# Patient Record
Sex: MALE | Race: WHITE
[De-identification: names, ages, dates, MRNs, and addresses within clinical notes are randomized per-mention and may not be internally consistent; named-entity substitution may affect disease eponyms.]

---

## 2017-05-02 ENCOUNTER — PRIOR ORIGINAL RECORDS (OUTPATIENT)
Dept: OTHER | Age: 72
End: 2017-05-02

## 2017-07-20 ENCOUNTER — HOSPITAL ENCOUNTER (OUTPATIENT)
Dept: MRI IMAGING | Facility: HOSPITAL | Age: 72
Discharge: HOME | End: 2017-07-20
Attending: INTERNAL MEDICINE
Payer: COMMERCIAL

## 2017-07-20 DIAGNOSIS — M54.50 CHRONIC LEFT-SIDED LOW BACK PAIN: ICD-10-CM

## 2017-07-20 DIAGNOSIS — G89.29 CHRONIC LEFT-SIDED LOW BACK PAIN: ICD-10-CM

## 2017-07-20 DIAGNOSIS — M54.42 ACUTE BACK PAIN WITH SCIATICA, LEFT: ICD-10-CM

## 2017-07-20 PROCEDURE — A9575 INJ GADOTERATE MEGLUMI 0.1ML: HCPCS | Performed by: RADIOLOGY

## 2017-07-20 PROCEDURE — 72158 MRI LUMBAR SPINE W/O & W/DYE: CPT | Performed by: INTERNAL MEDICINE

## 2017-08-04 PROBLEM — M48.061 LUMBAR FORAMINAL STENOSIS: Status: ACTIVE | Noted: 2017-08-04

## 2017-08-04 PROBLEM — M43.16 SPONDYLOLISTHESIS OF LUMBAR REGION: Status: ACTIVE | Noted: 2017-08-04

## 2017-08-04 PROBLEM — M43.06 LUMBAR SPONDYLOLYSIS: Status: ACTIVE | Noted: 2017-08-04

## 2017-11-07 ENCOUNTER — PRIOR ORIGINAL RECORDS (OUTPATIENT)
Dept: OTHER | Age: 72
End: 2017-11-07

## 2017-11-09 ENCOUNTER — CHARTING TRANS (OUTPATIENT)
Dept: OTHER | Age: 72
End: 2017-11-09

## 2017-11-14 PROBLEM — M51.36 DDD (DEGENERATIVE DISC DISEASE), LUMBAR: Status: ACTIVE | Noted: 2017-11-14

## 2017-11-14 PROBLEM — M51.369 DDD (DEGENERATIVE DISC DISEASE), LUMBAR: Status: ACTIVE | Noted: 2017-11-14

## 2017-11-14 PROBLEM — M54.16 LUMBAR RADICULITIS: Status: ACTIVE | Noted: 2017-11-14

## 2017-11-14 PROBLEM — M47.816 LUMBAR SPONDYLOSIS: Status: ACTIVE | Noted: 2017-11-14

## 2018-02-08 ENCOUNTER — HOSPITAL ENCOUNTER (OUTPATIENT)
Dept: GENERAL RADIOLOGY | Age: 73
Discharge: HOME | End: 2018-02-08
Attending: INTERNAL MEDICINE
Payer: MEDICARE

## 2018-02-08 ENCOUNTER — OFFICE VISIT (OUTPATIENT)
Dept: INTERNAL MEDICINE CLINIC | Facility: CLINIC | Age: 73
End: 2018-02-08

## 2018-02-08 VITALS
WEIGHT: 187.81 LBS | RESPIRATION RATE: 14 BRPM | OXYGEN SATURATION: 97 % | SYSTOLIC BLOOD PRESSURE: 106 MMHG | DIASTOLIC BLOOD PRESSURE: 60 MMHG | HEIGHT: 66.25 IN | TEMPERATURE: 97 F | HEART RATE: 55 BPM | BODY MASS INDEX: 30.18 KG/M2

## 2018-02-08 DIAGNOSIS — R68.84 JAW PAIN, NON-TMJ: ICD-10-CM

## 2018-02-08 DIAGNOSIS — R05.3 CHRONIC COUGH: Primary | ICD-10-CM

## 2018-02-08 DIAGNOSIS — R05.3 CHRONIC COUGH: ICD-10-CM

## 2018-02-08 PROBLEM — M43.06 LUMBAR SPONDYLOLYSIS: Status: RESOLVED | Noted: 2017-08-04 | Resolved: 2018-02-08

## 2018-02-08 PROCEDURE — 99204 OFFICE O/P NEW MOD 45 MIN: CPT | Performed by: INTERNAL MEDICINE

## 2018-02-08 PROCEDURE — 71046 X-RAY EXAM CHEST 2 VIEWS: CPT | Performed by: INTERNAL MEDICINE

## 2018-02-08 RX ORDER — AZELASTINE HYDROCHLORIDE, FLUTICASONE PROPIONATE 137; 50 UG/1; UG/1
1 SPRAY, METERED NASAL 2 TIMES DAILY
Qty: 1 BOTTLE | Refills: 2 | Status: SHIPPED | OUTPATIENT
Start: 2018-02-08 | End: 2018-02-12

## 2018-02-08 NOTE — PATIENT INSTRUCTIONS
Jaw rest: no chewey food like gum and cut food into small pieces, no big bites like bagel sandwiches, no hard bites like apples, use hot pack for the jaw when acting up,

## 2018-02-08 NOTE — PROGRESS NOTES
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 Imaging & Consults:  XR CHEST PA + LAT CHEST (CPT=71046)    Return for supervisit.     Patient Instructions   Tramaine Fuller

## 2018-02-12 ENCOUNTER — TELEPHONE (OUTPATIENT)
Dept: INTERNAL MEDICINE CLINIC | Facility: CLINIC | Age: 73
End: 2018-02-12

## 2018-02-12 RX ORDER — MOMETASONE 50 UG/1
SPRAY, METERED NASAL
Qty: 1 BOTTLE | Refills: 2 | Status: SHIPPED | OUTPATIENT
Start: 2018-02-12 | End: 2018-02-15

## 2018-02-12 NOTE — TELEPHONE ENCOUNTER
Fax (Prior Auth Form) received from [employer], [address] [place] [address] not covered.  Required clinical information for prior auth to be considered is pt to have tried either fluticasone, flunisolide, nasonex, or triamcinolone nasal sprays WITH concurrent use of azelastine o

## 2018-02-12 NOTE — TELEPHONE ENCOUNTER
Switch to nasonex, 2 sprays each nostril daily , #1, 2 refills  If no improvement 1 month, add azelastine 2 sprays BID, call for Rx

## 2018-02-12 NOTE — TELEPHONE ENCOUNTER
Rx ordered and sent to local pharmacy (per pt request). Spoke with pt, advised Dymista not covered by insurance. Advised of new rx, if no improvement will add 2nd nasal spray, if still fails, can try Dymista again.  Pt to call after a month if no improveme

## 2018-02-13 ENCOUNTER — TELEPHONE (OUTPATIENT)
Dept: INTERNAL MEDICINE CLINIC | Facility: CLINIC | Age: 73
End: 2018-02-13

## 2018-02-13 DIAGNOSIS — R05.3 CHRONIC COUGH: Primary | ICD-10-CM

## 2018-02-13 NOTE — TELEPHONE ENCOUNTER
Prior Auth required for Nasonex ordered yesterday. Nasonex was ordered in place of Dymista which was not approved and required pt to try and fail 2 nasal sprays, nasonex as 1 that was marked as recommended.    Prior Auth initiated for Nasonex via [employer]

## 2018-02-14 NOTE — TELEPHONE ENCOUNTER
Nasonex denied. Nasacort, Rhinocort and generic flunisolide are covered. Letter to Dr Luana Cruz, please advise.

## 2018-02-14 NOTE — TELEPHONE ENCOUNTER
Received fax from [address] [place] [address] Dr hurd: Notice of Denial of Mometasone. Nasocort listed as alternative. Noted below that Prior Auth for alternative Nasonex  initiated. Await  PA answer. Form in Triage pending bin.

## 2018-02-14 NOTE — TELEPHONE ENCOUNTER
Incoming (mail or fax): Fax  Received from:  [employer] - Notice of denial for Mometasone  Documentation given to:  [id number] fax bin.

## 2018-02-15 RX ORDER — TRIAMCINOLONE ACETONIDE 55 UG/1
SPRAY, METERED NASAL
Qty: 1 INHALER | Refills: 2 | Status: SHIPPED | OUTPATIENT
Start: 2018-02-15 | End: 2018-08-02 | Stop reason: ALTCHOICE

## 2018-02-15 NOTE — TELEPHONE ENCOUNTER
Spoke with pt. Advised as below that Nasocort ordered and pt to use 2 sprays in each nostril daily. Pt voiced understanding.

## 2018-02-15 NOTE — TELEPHONE ENCOUNTER
Noted below. D/Cd Mometasone. Ordered Nasocort. Left message on pt's voice mail advising that nasocort ordered. Pt to call back to confirm receipt of message.

## 2018-03-26 NOTE — TELEPHONE ENCOUNTER
Rx had already been changed to Nasonex. Pt to try Nasonex first then if no improvement, would try 2nd nasal spray before trying Dymista again. At this time, Thea Lyons has been refused by insurance.

## 2018-03-31 ENCOUNTER — HOSPITAL ENCOUNTER (OUTPATIENT)
Age: 73
Discharge: HOME | End: 2018-03-31
Attending: FAMILY MEDICINE
Payer: MEDICARE

## 2018-03-31 VITALS
HEART RATE: 57 BPM | WEIGHT: 185 LBS | BODY MASS INDEX: 27.4 KG/M2 | TEMPERATURE: 98 F | RESPIRATION RATE: 16 BRPM | HEIGHT: 69 IN | DIASTOLIC BLOOD PRESSURE: 77 MMHG | OXYGEN SATURATION: 94 % | SYSTOLIC BLOOD PRESSURE: 134 MMHG

## 2018-03-31 DIAGNOSIS — J40 BRONCHITIS: Primary | ICD-10-CM

## 2018-03-31 PROCEDURE — 99213 OFFICE O/P EST LOW 20 MIN: CPT

## 2018-03-31 PROCEDURE — 99204 OFFICE O/P NEW MOD 45 MIN: CPT

## 2018-03-31 RX ORDER — AZITHROMYCIN 250 MG/1
TABLET, FILM COATED ORAL
Qty: 1 PACKAGE | Refills: 0 | Status: SHIPPED | OUTPATIENT
Start: 2018-03-31 | End: 2018-04-05

## 2018-03-31 NOTE — ED PROVIDER NOTES
Patient Seen in: [facility] [place] [facility]    History   Patient presents with:  Cough/URI    Stated Complaint: cough, body aches, chills, fever    HPI  67year old with cough and production for past two days.  Felt cold and had chills yest (5' 9\")   Wt 83.9 kg   SpO2 94%   BMI 27.32 kg/m²         Physical Exam   Constitutional: He appears well-developed and well-nourished. HENT:   Head: Normocephalic and atraumatic. Neck: Normal range of motion. Neck supple.    Cardiovascular: Normal rat

## 2018-03-31 NOTE — ED INITIAL ASSESSMENT (HPI)
Since yesterday, c/o body aches, productive cough, chills and fatigue. Using OTC cold products with minimal relief.

## 2018-04-09 ENCOUNTER — HOSPITAL ENCOUNTER (OUTPATIENT)
Age: 73
Discharge: HOME | End: 2018-04-09
Attending: FAMILY MEDICINE
Payer: MEDICARE

## 2018-04-09 ENCOUNTER — APPOINTMENT (OUTPATIENT)
Dept: GENERAL RADIOLOGY | Age: 73
End: 2018-04-09
Attending: FAMILY MEDICINE
Payer: MEDICARE

## 2018-04-09 VITALS
DIASTOLIC BLOOD PRESSURE: 80 MMHG | SYSTOLIC BLOOD PRESSURE: 127 MMHG | RESPIRATION RATE: 18 BRPM | HEIGHT: 69 IN | TEMPERATURE: 98 F | HEART RATE: 45 BPM | BODY MASS INDEX: 27.4 KG/M2 | OXYGEN SATURATION: 95 % | WEIGHT: 185 LBS

## 2018-04-09 DIAGNOSIS — J20.9 ACUTE BRONCHITIS, UNSPECIFIED ORGANISM: Primary | ICD-10-CM

## 2018-04-09 PROCEDURE — 99214 OFFICE O/P EST MOD 30 MIN: CPT

## 2018-04-09 PROCEDURE — 71046 X-RAY EXAM CHEST 2 VIEWS: CPT | Performed by: FAMILY MEDICINE

## 2018-04-09 PROCEDURE — 99213 OFFICE O/P EST LOW 20 MIN: CPT

## 2018-04-09 RX ORDER — BENZONATATE 100 MG/1
100 CAPSULE ORAL 3 TIMES DAILY PRN
Qty: 21 CAPSULE | Refills: 0 | Status: SHIPPED | OUTPATIENT
Start: 2018-04-09 | End: 2018-04-16

## 2018-04-09 RX ORDER — DOXYCYCLINE 150 MG/1
100 CAPSULE ORAL 2 TIMES DAILY
Qty: 20 CAPSULE | Refills: 0 | Status: SHIPPED | OUTPATIENT
Start: 2018-04-09 | End: 2018-04-19

## 2018-04-09 NOTE — ED INITIAL ASSESSMENT (HPI)
The patient is here with complaints of a productive cough x 2 weeks. He states the mucus was yellow-brown at first and the last couple of days has been clear. Had chills once or twice in the beginning but denies anything since.   He has been using Delsym

## 2018-04-09 NOTE — ED PROVIDER NOTES
Patient Seen in: [facility]    History   Patient presents with:  Cough/URI    Stated Complaint: cold symptoms x2 weeks    HPI    68-year-old male presents with chief complaint of cough for the past 2 weeks.   He describes hi 9\")   Wt 83.9 kg   SpO2 95%   BMI 27.32 kg/m²         Physical Exam    Patient is alert oriented ×3 in no acute distress   conjunctiva clear no icterus  Bilateral tympanic membrane is clear without any redness  Oropharynx : No erythema, no exudates.   Neck symptoms. If no improvement start doxycycline. Follow PCP in a week. If is any worsening symptoms recommend follow-up early or go to the ER.           Disposition and Plan     Clinical Impression:  Acute bronchitis, unspecified organism  (primary encount

## 2018-05-21 PROBLEM — D69.6 THROMBOCYTOPENIA (HCC): Status: ACTIVE | Noted: 2018-05-21

## 2018-05-21 PROBLEM — M47.816 LUMBAR SPONDYLOSIS: Status: RESOLVED | Noted: 2017-11-14 | Resolved: 2018-05-21

## 2018-05-21 PROBLEM — R68.84 JAW PAIN, NON-TMJ: Status: RESOLVED | Noted: 2018-02-08 | Resolved: 2018-05-21

## 2018-05-21 PROBLEM — I70.0 ATHEROSCLEROSIS OF AORTA: Status: ACTIVE | Noted: 2018-05-21

## 2018-05-21 PROBLEM — M51.36 DDD (DEGENERATIVE DISC DISEASE), LUMBAR: Status: RESOLVED | Noted: 2017-11-14 | Resolved: 2018-05-21

## 2018-05-21 PROBLEM — I70.0 ATHEROSCLEROSIS OF AORTA (HCC): Status: ACTIVE | Noted: 2018-05-21

## 2018-05-21 PROBLEM — M51.369 DDD (DEGENERATIVE DISC DISEASE), LUMBAR: Status: RESOLVED | Noted: 2017-11-14 | Resolved: 2018-05-21

## 2018-05-21 PROBLEM — M47.816 ARTHRITIS, LUMBAR SPINE: Status: ACTIVE | Noted: 2018-05-21

## 2018-05-21 PROBLEM — M48.061 LUMBAR FORAMINAL STENOSIS: Status: RESOLVED | Noted: 2017-08-04 | Resolved: 2018-05-21

## 2018-05-21 PROBLEM — M54.16 LUMBAR RADICULITIS: Status: RESOLVED | Noted: 2017-11-14 | Resolved: 2018-05-21

## 2018-05-21 PROBLEM — D69.6 THROMBOCYTOPENIA: Status: ACTIVE | Noted: 2018-05-21

## 2018-05-21 PROBLEM — M43.16 SPONDYLOLISTHESIS OF LUMBAR REGION: Status: RESOLVED | Noted: 2017-08-04 | Resolved: 2018-05-21

## 2018-05-22 ENCOUNTER — OFFICE VISIT (OUTPATIENT)
Dept: INTERNAL MEDICINE CLINIC | Facility: CLINIC | Age: 73
End: 2018-05-22

## 2018-05-22 ENCOUNTER — TELEPHONE (OUTPATIENT)
Dept: INTERNAL MEDICINE CLINIC | Facility: CLINIC | Age: 73
End: 2018-05-22

## 2018-05-22 VITALS
TEMPERATURE: 98 F | HEIGHT: 66.25 IN | WEIGHT: 189 LBS | BODY MASS INDEX: 30.37 KG/M2 | DIASTOLIC BLOOD PRESSURE: 70 MMHG | SYSTOLIC BLOOD PRESSURE: 130 MMHG | HEART RATE: 50 BPM | OXYGEN SATURATION: 98 % | RESPIRATION RATE: 16 BRPM

## 2018-05-22 DIAGNOSIS — D69.6 THROMBOCYTOPENIA (HCC): ICD-10-CM

## 2018-05-22 DIAGNOSIS — I70.0 ATHEROSCLEROSIS OF AORTA (HCC): ICD-10-CM

## 2018-05-22 DIAGNOSIS — Z00.00 ENCOUNTER FOR ANNUAL HEALTH EXAMINATION: Primary | ICD-10-CM

## 2018-05-22 DIAGNOSIS — M47.816 ARTHRITIS, LUMBAR SPINE: ICD-10-CM

## 2018-05-22 DIAGNOSIS — R39.9 LOWER URINARY TRACT SYMPTOMS (LUTS): ICD-10-CM

## 2018-05-22 DIAGNOSIS — E78.2 MIXED HYPERLIPIDEMIA: ICD-10-CM

## 2018-05-22 DIAGNOSIS — Z12.11 COLON CANCER SCREENING: ICD-10-CM

## 2018-05-22 DIAGNOSIS — I25.10 CORONARY ARTERY DISEASE INVOLVING NATIVE CORONARY ARTERY OF NATIVE HEART WITHOUT ANGINA PECTORIS: ICD-10-CM

## 2018-05-22 DIAGNOSIS — E03.9 ACQUIRED HYPOTHYROIDISM: ICD-10-CM

## 2018-05-22 DIAGNOSIS — R05.3 CHRONIC COUGH: ICD-10-CM

## 2018-05-22 PROCEDURE — G0439 PPPS, SUBSEQ VISIT: HCPCS | Performed by: INTERNAL MEDICINE

## 2018-05-22 PROCEDURE — 96160 PT-FOCUSED HLTH RISK ASSMT: CPT | Performed by: INTERNAL MEDICINE

## 2018-05-22 PROCEDURE — 99397 PER PM REEVAL EST PAT 65+ YR: CPT | Performed by: INTERNAL MEDICINE

## 2018-05-22 NOTE — PATIENT INSTRUCTIONS
Dary Nuñez SCREENING SCHEDULE   Tests on this list are recommended by your physician but may not be covered, or covered at this frequency, by your insurer. Please check with your insurance carrier before scheduling to verify coverage.     Rose Marie Mckeon years- more often if abnormal Colonoscopy,10 Years due on 04/21/1995 Update [facility] if applicable    Flex Sigmoidoscopy Screen  Covered every 5 years No results found for this or any previous visit. No flowsheet data found.      Fecal Occult Bloo cover unless Medically needed    Zoster (Not covered by Medicare Part B) No orders found for this or any previous visit.      CALL YOUR INSURANCE TO FIND OUT WHERE TO GET THE NEW 2-SHOT Brianna Severino 10 This may be covered with your pharmacy  prescription be

## 2018-05-22 NOTE — PROGRESS NOTES
HPI:   Cortney Del Rio is a 68year old male who presents for a MA (Medicare Advantage) Supervisit (Once per calendar year).     He has a chronic shallow tickly cough, normal CXR, using nasal spary and it helped some  He had CABG times 3 some years ago a Family/surrogate (if present), and forms available to patient in AVS         He smoked tobacco in the past but quit greater than 12 months ago.   Smoking status: Former Smoker                                                              Packs/day: 0.00 DAILY   Triamcinolone Acetonide 55 MCG/ACT Nasal Aerosol Two sprays in each nostril daily   METOPROLOL SUCCINATE ER 25 MG Oral Tablet 24 Hr TAKE 1 TABLET BY MOUTH  DAILY   EZETIMIBE 10 MG Oral Tab TAKE 1 TABLET BY MOUTH  DAILY   LOSARTAN POTASSIUM 25 MG Or BMI 30.28 kg/m²   Estimated body mass index is 30.28 kg/m² as calculated from the following:    Height as of this encounter: 66.25\". Weight as of this encounter: 189 lb.     Medicare Hearing Assessment  (Required for AWV/SWV)    Finger Rub christy b/l 13) 05/12/2016   • Pneumovax 23 10/05/2011   • TDAP 05/07/2012   • Zoster Vaccine Live (Zostavax) 07/05/2013        ASSESSMENT AND OTHER RELEVANT CHRONIC CONDITIONS:   Danielle Mistry is a 68year old male who presents for a Medicare Assessment.      PLAN ----------    Cardiovascular Disease Screening     LDL Annually Calculated LDL (mg/dL)   Date Value   12/15/2017 41        EKG - w/ Initial Preventative Physical Exam only, or if medically necessary Electrocardiogram date06/16/2016    Colorectal Cancer S Annual Monitoring of Persistent     Medications (ACE/ARB, digoxin diuretics, anticonvulsants.)    Potassium  Annually Potassium (mmol/L)   Date Value   12/15/2017 4.4    No flowsheet data found.     Creatinine  Annually Creatinine (mg/dL)   Date Value   1

## 2018-06-05 ENCOUNTER — OFFICE VISIT (OUTPATIENT)
Dept: SURGERY | Facility: CLINIC | Age: 73
End: 2018-06-05

## 2018-06-05 VITALS
HEART RATE: 50 BPM | WEIGHT: 185 LBS | RESPIRATION RATE: 16 BRPM | HEIGHT: 69 IN | BODY MASS INDEX: 27.4 KG/M2 | DIASTOLIC BLOOD PRESSURE: 77 MMHG | SYSTOLIC BLOOD PRESSURE: 126 MMHG

## 2018-06-05 DIAGNOSIS — Z12.11 SCREENING FOR MALIGNANT NEOPLASM OF COLON: Primary | ICD-10-CM

## 2018-06-05 RX ORDER — POLYETHYLENE GLYCOL 3350, SODIUM CHLORIDE, SODIUM BICARBONATE, POTASSIUM CHLORIDE 420; 11.2; 5.72; 1.48 G/4L; G/4L; G/4L; G/4L
POWDER, FOR SOLUTION ORAL
Qty: 1 BOTTLE | Refills: 0 | Status: SHIPPED | OUTPATIENT
Start: 2018-06-05 | End: 2018-08-02 | Stop reason: ALTCHOICE

## 2018-06-05 NOTE — H&P
New Patient Visit Note       Active Problems      1. Screening for malignant neoplasm of colon        Chief Complaint   Patient presents with:  Colonoscopy: [facility] PT ref by PCP for cscope consult. PT last cscope was when he was 48, had hemorrhoids.  PT denies drinks daily    Drug use: No            Other Topics            Concern  Caffeine Concern        No  Exercise                Yes  Seat Belt               Yes  Special Diet            No  Stress Concern          No  Weight Concern          No         Domitila urinating, dysuria, frequency and urgency. Musculoskeletal: Negative for arthralgias and myalgias. Skin: Negative for color change and rash. Neurological: Negative for tremors, syncope and weakness. Hematological: Negative for adenopathy.  Does not

## 2018-07-03 ENCOUNTER — MED REC SCAN ONLY (OUTPATIENT)
Dept: INTERNAL MEDICINE CLINIC | Facility: CLINIC | Age: 73
End: 2018-07-03

## 2018-07-26 ENCOUNTER — TELEPHONE (OUTPATIENT)
Dept: INTERNAL MEDICINE CLINIC | Facility: CLINIC | Age: 73
End: 2018-07-26

## 2018-07-26 NOTE — TELEPHONE ENCOUNTER
Noted below. The patient has never been evaluated here or referred out for his complaint of skin growths on his face.  Called the patient and informed him to make an appointment with either Dr. Adina Villalobos or Ronald Devine for an evaluation and check with his insura

## 2018-07-26 NOTE — TELEPHONE ENCOUNTER
Previous doctor (Dr Hortense Homans) is not part of patients network any longer. He is requesting to see someone in the [facility]     Functional Status Done?:  yes

## 2018-07-30 ENCOUNTER — OFFICE VISIT (OUTPATIENT)
Dept: SURGERY | Facility: CLINIC | Age: 73
End: 2018-07-30
Payer: COMMERCIAL

## 2018-07-30 VITALS
TEMPERATURE: 98 F | DIASTOLIC BLOOD PRESSURE: 90 MMHG | HEIGHT: 69 IN | SYSTOLIC BLOOD PRESSURE: 145 MMHG | WEIGHT: 185 LBS | BODY MASS INDEX: 27.4 KG/M2 | HEART RATE: 43 BPM

## 2018-07-30 DIAGNOSIS — K57.30 DIVERTICULOSIS OF LARGE INTESTINE WITHOUT DIVERTICULITIS: Primary | ICD-10-CM

## 2018-07-30 PROCEDURE — 99212 OFFICE O/P EST SF 10 MIN: CPT | Performed by: SURGERY

## 2018-07-30 NOTE — PROGRESS NOTES
Follow Up Visit Note       Active Problems      1.  Diverticulosis of large intestine without diverticulitis          Chief Complaint   Patient presents with:  Test Results: pt is here for colonoscopy results colonoscopy done 6/14         History of Present Yes  Special Diet            No  Stress Concern          No  Weight Concern          No         Current Outpatient Prescriptions:  PEG 3350-KCl-Na Bicarb-NaCl (TRILYTE) 420 g Oral Recon Soln Starting at 4:00 pm the night before procedure, drink 8 ounce dysuria, frequency and urgency. Musculoskeletal: Negative for arthralgias and myalgias. Skin: Negative for color change and rash. Neurological: Negative for tremors, syncope and weakness. Hematological: Negative for adenopathy.  Does not bruise/blee

## 2018-07-30 NOTE — PATIENT INSTRUCTIONS
HIGH FIBER DIET    This high fiber diet includes two major components. One is a natural high fiber that is dietary high. The other is a fiber supplement.     Natural Dietary Fiber:    These are very few products on the market that have high enough fiber c are simply an equivalent to a ground up apple peel. That is they just provide extra natural fiber. They will enter your colon in a non-digestable form providing bulk to your stool.   Therefore, they are safe to take on a daily basis for the rest of you li problems:    1. Most people describe a gassy or bloated feeling for the first ten days to two weeks. This will pass with time. I recommend that once you get on the diet that you stay on it and complete it as prescribed.   Again, you will enjoy normal curtis

## 2018-08-01 ENCOUNTER — TELEPHONE (OUTPATIENT)
Dept: INTERNAL MEDICINE CLINIC | Facility: CLINIC | Age: 73
End: 2018-08-01

## 2018-08-01 DIAGNOSIS — I25.10 CORONARY ARTERY DISEASE INVOLVING NATIVE CORONARY ARTERY OF NATIVE HEART WITHOUT ANGINA PECTORIS: Primary | ICD-10-CM

## 2018-08-01 DIAGNOSIS — E78.2 MIXED HYPERLIPIDEMIA: ICD-10-CM

## 2018-08-01 DIAGNOSIS — I70.0 ATHEROSCLEROSIS OF AORTA (HCC): ICD-10-CM

## 2018-08-01 NOTE — TELEPHONE ENCOUNTER
Patient was seen yesterday by Dr Shannan Norris who ordered a nuclear stress test for patient for today. Patient needs a referral for yesterday's appt (if possible) and for the test today. Please advise. Thank you!     Functional Status Done?:  No - provider'

## 2018-08-01 NOTE — TELEPHONE ENCOUNTER
AGUUSTA Franco at referrals.  Advised referral placed for dr visit, requested clarification for stress test.

## 2018-08-02 ENCOUNTER — OFFICE VISIT (OUTPATIENT)
Dept: INTERNAL MEDICINE CLINIC | Facility: CLINIC | Age: 73
End: 2018-08-02
Payer: COMMERCIAL

## 2018-08-02 VITALS
WEIGHT: 190 LBS | HEART RATE: 52 BPM | OXYGEN SATURATION: 97 % | BODY MASS INDEX: 28.14 KG/M2 | HEIGHT: 69 IN | TEMPERATURE: 98 F | RESPIRATION RATE: 14 BRPM | SYSTOLIC BLOOD PRESSURE: 124 MMHG | DIASTOLIC BLOOD PRESSURE: 76 MMHG

## 2018-08-02 DIAGNOSIS — L57.0 ACTINIC KERATOSIS: Primary | ICD-10-CM

## 2018-08-02 PROCEDURE — 99213 OFFICE O/P EST LOW 20 MIN: CPT | Performed by: INTERNAL MEDICINE

## 2018-08-02 NOTE — PROGRESS NOTES
Kelley Goldmann is a 68year old male  Patient presents with:  Bump: Different Areas with Raised Bumps, pimple like  Referral: DERM      HPI:   Bumps on scalp  He had the same thing 2 years ago and derm burned him   He feels they might be the same     Cur Standard drinks or equivalent: 1 per week     Comment: Daily 1-2 drinks daily     Patient Active Problem List:     Hypothyroid     Coronary artery disease involving native coronary artery of native heart without angina pectoris     Mixed hyperlipidemia

## 2018-08-03 ENCOUNTER — TELEPHONE (OUTPATIENT)
Dept: INTERNAL MEDICINE CLINIC | Facility: CLINIC | Age: 73
End: 2018-08-03

## 2018-08-03 NOTE — TELEPHONE ENCOUNTER
Rhoda Busch from Dr. Daniel Walker office called back to have our office fax the referral. Rhoda Busch stated their office would order nuclear test. Referral faxed and confirmed.

## 2018-08-30 ENCOUNTER — TELEPHONE (OUTPATIENT)
Dept: INTERNAL MEDICINE CLINIC | Facility: CLINIC | Age: 73
End: 2018-08-30

## 2018-08-30 NOTE — TELEPHONE ENCOUNTER
Incoming (mail or fax):  fax  Received from:  Yasmany Phlegm  Documentation given to:  Dr Brad Sexton

## 2018-11-08 ENCOUNTER — CHARTING TRANS (OUTPATIENT)
Dept: OTHER | Age: 73
End: 2018-11-08

## 2018-11-27 ENCOUNTER — LAB ENCOUNTER (OUTPATIENT)
Dept: LAB | Age: 73
End: 2018-11-27
Attending: INTERNAL MEDICINE
Payer: MEDICARE

## 2018-11-27 ENCOUNTER — OFFICE VISIT (OUTPATIENT)
Dept: INTERNAL MEDICINE CLINIC | Facility: CLINIC | Age: 73
End: 2018-11-27
Payer: COMMERCIAL

## 2018-11-27 VITALS
DIASTOLIC BLOOD PRESSURE: 64 MMHG | BODY MASS INDEX: 28.17 KG/M2 | HEIGHT: 69 IN | TEMPERATURE: 98 F | HEART RATE: 45 BPM | RESPIRATION RATE: 16 BRPM | SYSTOLIC BLOOD PRESSURE: 112 MMHG | OXYGEN SATURATION: 98 % | WEIGHT: 190.19 LBS

## 2018-11-27 DIAGNOSIS — D69.6 THROMBOCYTOPENIA (HCC): ICD-10-CM

## 2018-11-27 DIAGNOSIS — E78.2 MIXED HYPERLIPIDEMIA: ICD-10-CM

## 2018-11-27 DIAGNOSIS — G89.29 CHRONIC SCAPULAR PAIN: ICD-10-CM

## 2018-11-27 DIAGNOSIS — E03.9 ACQUIRED HYPOTHYROIDISM: ICD-10-CM

## 2018-11-27 DIAGNOSIS — M75.51 BURSITIS OF RIGHT SHOULDER: ICD-10-CM

## 2018-11-27 DIAGNOSIS — I25.10 CORONARY ARTERY DISEASE INVOLVING NATIVE CORONARY ARTERY OF NATIVE HEART WITHOUT ANGINA PECTORIS: ICD-10-CM

## 2018-11-27 DIAGNOSIS — R39.9 LOWER URINARY TRACT SYMPTOMS (LUTS): ICD-10-CM

## 2018-11-27 DIAGNOSIS — M89.8X1 CHRONIC SCAPULAR PAIN: ICD-10-CM

## 2018-11-27 DIAGNOSIS — D69.6 THROMBOCYTOPENIA (HCC): Primary | ICD-10-CM

## 2018-11-27 PROCEDURE — 85025 COMPLETE CBC W/AUTO DIFF WBC: CPT

## 2018-11-27 PROCEDURE — 80053 COMPREHEN METABOLIC PANEL: CPT

## 2018-11-27 PROCEDURE — 80061 LIPID PANEL: CPT

## 2018-11-27 PROCEDURE — 36415 COLL VENOUS BLD VENIPUNCTURE: CPT

## 2018-11-27 PROCEDURE — 84443 ASSAY THYROID STIM HORMONE: CPT

## 2018-11-27 PROCEDURE — 84439 ASSAY OF FREE THYROXINE: CPT

## 2018-11-27 PROCEDURE — 99214 OFFICE O/P EST MOD 30 MIN: CPT | Performed by: INTERNAL MEDICINE

## 2018-11-27 RX ORDER — LEVOTHYROXINE SODIUM 175 UG/1
175 TABLET ORAL DAILY
COMMUNITY
Start: 2018-05-17 | End: 2018-11-29

## 2018-11-27 RX ORDER — SILDENAFIL 100 MG/1
100 TABLET, FILM COATED ORAL
Qty: 5 TABLET | Refills: 2 | Status: SHIPPED | OUTPATIENT
Start: 2018-11-27 | End: 2019-04-04

## 2018-11-27 NOTE — PROGRESS NOTES
Sherman Hatchet is a 68year old male.  To F/U from last visit regarding LUTS, arthritis spine, CAD, hypothyroidism, mild ITP  HPI:    summary from last visit 6 mos ago:  Lower urinary tract symptoms (LUTS)- , stable, no more hematuria since plavix d/c'd 3   EZETIMIBE 10 MG Oral Tab TAKE 1 TABLET BY MOUTH  DAILY Disp: 90 tablet Rfl: 3   LOSARTAN POTASSIUM 25 MG Oral Tab TAKE 1 TABLET BY MOUTH  NIGHTLY Disp: 90 tablet Rfl: 3   aspirin 81 MG Oral Tab Take 81 mg by mouth daily.  Disp:  Rfl:    Multiple Vitamin tsh  Lower urinary tract symptoms (luts)- see .  Also ED, refill viagra  Bursitis right shoulder: PT  Right scapular paini: PT    Orders Placed This Encounter      CBC With Differential With Platelet      CMP      Lipid Panel      TSH W Reflex To Free T4

## 2018-11-29 NOTE — PROGRESS NOTES
Spoke to patient, aware of results, and recommendations. Pt voiced understanding. Medication reconciled and sent to pharmacy.

## 2018-12-13 ENCOUNTER — PRIOR ORIGINAL RECORDS (OUTPATIENT)
Dept: OTHER | Age: 73
End: 2018-12-13

## 2018-12-31 ENCOUNTER — PRIOR ORIGINAL RECORDS (OUTPATIENT)
Dept: OTHER | Age: 73
End: 2018-12-31

## 2019-02-14 ENCOUNTER — HOSPITAL ENCOUNTER (OUTPATIENT)
Dept: PHYSICAL THERAPY | Facility: HOSPITAL | Age: 74
Setting detail: THERAPIES SERIES
Discharge: HOME | End: 2019-02-14
Attending: INTERNAL MEDICINE
Payer: MEDICARE

## 2019-02-14 DIAGNOSIS — M89.8X1 CHRONIC SCAPULAR PAIN: ICD-10-CM

## 2019-02-14 DIAGNOSIS — M75.51 BURSITIS OF RIGHT SHOULDER: ICD-10-CM

## 2019-02-14 DIAGNOSIS — G89.29 CHRONIC SCAPULAR PAIN: ICD-10-CM

## 2019-02-14 PROCEDURE — 97110 THERAPEUTIC EXERCISES: CPT | Performed by: PHYSICAL THERAPIST

## 2019-02-14 PROCEDURE — 97162 PT EVAL MOD COMPLEX 30 MIN: CPT | Performed by: PHYSICAL THERAPIST

## 2019-02-14 NOTE — PROGRESS NOTES
UPPER EXTREMITY EVALUATION:   Referring Physician: Dr. Maxine Frederick  Diagnosis: Bursitis of right shoulder (M75.51)  Chronic scapular pain (M89.8X1,G89.29)      Date of Service: 2/14/2019     PATIENT Jeanette Garcia is a 68year old y/o male w patient's chief complaint include palpation on the long head of the biceps, + impingement sign for the right rotator cuff. Functional limitations include limited with some ADL;s including dressing, using the right UE.    Reports also difficulty with pull OF CARE:    Goals:    · Pt will report improved ability to sleep without waking due to shoulder pain (8 visits)  · Pt will improve shoulder flexion AROM  to be able to reach into overhead cabinets without pain or restriction (8 visits)  · Pt will improve s plan of treatment and while under my care.     X___________________________________________________ Date____________________    Certification From: 4/65/3555  To:5/15/2019

## 2019-02-21 ENCOUNTER — HOSPITAL ENCOUNTER (OUTPATIENT)
Dept: PHYSICAL THERAPY | Facility: HOSPITAL | Age: 74
Setting detail: THERAPIES SERIES
Discharge: HOME | End: 2019-02-21
Attending: INTERNAL MEDICINE
Payer: MEDICARE

## 2019-02-21 PROCEDURE — 97110 THERAPEUTIC EXERCISES: CPT | Performed by: PHYSICAL THERAPIST

## 2019-02-21 PROCEDURE — 97140 MANUAL THERAPY 1/> REGIONS: CPT | Performed by: PHYSICAL THERAPIST

## 2019-02-21 NOTE — PROGRESS NOTES
Dx: Bursitis of right shoulder (M75.51)  Chronic scapular pain (M89.8X1,G89.29)         Authorized # of Visits:  8         Next MD visit: none scheduled  Fall Risk: standard         Precautions: n/a             Subjective: shoulder feels pretty good overal ER/IR  Rows  extension         Stm to right shoulder katey long head of biceps          Punches 20 3#  Er supine 20 3#   Er sidelying no wght x20          Scap retraction x20          standing rows   extension   Mini  press x20 2#

## 2019-02-28 ENCOUNTER — HOSPITAL ENCOUNTER (OUTPATIENT)
Dept: PHYSICAL THERAPY | Facility: HOSPITAL | Age: 74
Setting detail: THERAPIES SERIES
Discharge: HOME | End: 2019-02-28
Attending: INTERNAL MEDICINE
Payer: MEDICARE

## 2019-02-28 PROCEDURE — 97110 THERAPEUTIC EXERCISES: CPT | Performed by: PHYSICAL THERAPIST

## 2019-02-28 PROCEDURE — 97140 MANUAL THERAPY 1/> REGIONS: CPT | Performed by: PHYSICAL THERAPIST

## 2019-02-28 NOTE — PROGRESS NOTES
Dx: Bursitis of right shoulder (M75.51)  Chronic scapular pain (M89.8X1,G89.29)         Authorized # of Visits:  8         Next MD visit: none scheduled  Fall Risk: standard         Precautions: n/a             Subjective: Feels pretty good overall.   Only long head of biceps         Punches 20 3#  Er supine 20 3#   Er sidelying no wght x20  Punches 20 3#  Er supine 20 3#   Er sidelying no wght x20         Scap retraction x20  Scap retraction x20         standing rows   extension   Mini  press x20 2#

## 2019-03-07 ENCOUNTER — HOSPITAL ENCOUNTER (OUTPATIENT)
Dept: PHYSICAL THERAPY | Facility: HOSPITAL | Age: 74
Setting detail: THERAPIES SERIES
Discharge: HOME | End: 2019-03-07
Attending: INTERNAL MEDICINE
Payer: MEDICARE

## 2019-03-07 PROCEDURE — 97140 MANUAL THERAPY 1/> REGIONS: CPT | Performed by: PHYSICAL THERAPIST

## 2019-03-07 PROCEDURE — 97110 THERAPEUTIC EXERCISES: CPT | Performed by: PHYSICAL THERAPIST

## 2019-03-08 NOTE — PROGRESS NOTES
Dx: Bursitis of right shoulder (M75.51)  Chronic scapular pain (M89.8X1,G89.29)         Authorized # of Visits:  8         Next MD visit: none scheduled  Fall Risk: standard         Precautions: n/a             Subjective: shoulder feels pretty good overal UBE x5' (2,3)  UBE x5' (2,3)       Blue TB x20   ER/IR  Rows  extension Blue TB x20   ER/IR  Rows  extension Blue TB x20   ER/IR  Rows  extension       Stm to right shoulder katey long head of biceps  Stm to right shoulder katey long head of biceps  Stm to rig

## 2019-03-18 ENCOUNTER — TELEPHONE (OUTPATIENT)
Dept: INTERNAL MEDICINE CLINIC | Facility: CLINIC | Age: 74
End: 2019-03-18

## 2019-03-18 DIAGNOSIS — L57.0 ACTINIC KERATOSIS: Primary | ICD-10-CM

## 2019-03-18 NOTE — TELEPHONE ENCOUNTER
Patient requesting for referral to Dr. Daniel Wiley. Patient has seen derm multiple times in the past for Dx: Actinic keratosis (L57.0). Okay to place referral? Routed to Dr. Jacy Stacy. Jodi Beckman M.D.   Dermatology  [facility]  [facility]

## 2019-03-18 NOTE — TELEPHONE ENCOUNTER
Functional Status Done?:  yes                              Functional Status Needs Review?:  no    Specialty Provider's Name?:  Dr Chaya Larios?:  derm  Reason for Visit?:  f/u  Diagnosis?:    Number of Visits Requested?: [address]

## 2019-03-19 NOTE — TELEPHONE ENCOUNTER
We dont have a list.  We can place the referral to see. It's the patients responsibility to provide us with in network providers. If it comes back as out of network, he would need to provide us with another name.   Unfortunately, there is no list.  Referr

## 2019-03-28 ENCOUNTER — TELEPHONE (OUTPATIENT)
Dept: INTERNAL MEDICINE CLINIC | Facility: CLINIC | Age: 74
End: 2019-03-28

## 2019-03-28 DIAGNOSIS — D69.6 THROMBOCYTOPENIA (HCC): Primary | ICD-10-CM

## 2019-04-02 ENCOUNTER — PRIOR ORIGINAL RECORDS (OUTPATIENT)
Dept: OTHER | Age: 74
End: 2019-04-02

## 2019-04-04 ENCOUNTER — APPOINTMENT (OUTPATIENT)
Dept: LAB | Age: 74
End: 2019-04-04
Attending: INTERNAL MEDICINE
Payer: MEDICARE

## 2019-04-04 ENCOUNTER — OFFICE VISIT (OUTPATIENT)
Dept: INTERNAL MEDICINE CLINIC | Facility: CLINIC | Age: 74
End: 2019-04-04
Payer: COMMERCIAL

## 2019-04-04 VITALS
OXYGEN SATURATION: 97 % | WEIGHT: 190.5 LBS | DIASTOLIC BLOOD PRESSURE: 78 MMHG | TEMPERATURE: 98 F | SYSTOLIC BLOOD PRESSURE: 122 MMHG | HEIGHT: 67.25 IN | HEART RATE: 74 BPM | RESPIRATION RATE: 16 BRPM | BODY MASS INDEX: 29.55 KG/M2

## 2019-04-04 DIAGNOSIS — Z13.5 GLAUCOMA SCREENING: ICD-10-CM

## 2019-04-04 DIAGNOSIS — I70.0 ATHEROSCLEROSIS OF AORTA (HCC): ICD-10-CM

## 2019-04-04 DIAGNOSIS — E03.9 ACQUIRED HYPOTHYROIDISM: ICD-10-CM

## 2019-04-04 DIAGNOSIS — M47.816 ARTHRITIS, LUMBAR SPINE: ICD-10-CM

## 2019-04-04 DIAGNOSIS — D69.6 THROMBOCYTOPENIA (HCC): ICD-10-CM

## 2019-04-04 DIAGNOSIS — Z11.59 NEED FOR HEPATITIS C SCREENING TEST: ICD-10-CM

## 2019-04-04 DIAGNOSIS — N50.812 TESTICULAR PAIN, LEFT: ICD-10-CM

## 2019-04-04 DIAGNOSIS — R39.9 LOWER URINARY TRACT SYMPTOMS (LUTS): ICD-10-CM

## 2019-04-04 DIAGNOSIS — E78.2 MIXED HYPERLIPIDEMIA: ICD-10-CM

## 2019-04-04 DIAGNOSIS — I25.10 CORONARY ARTERY DISEASE INVOLVING NATIVE CORONARY ARTERY OF NATIVE HEART WITHOUT ANGINA PECTORIS: ICD-10-CM

## 2019-04-04 DIAGNOSIS — Z00.00 ENCOUNTER FOR ANNUAL HEALTH EXAMINATION: Primary | ICD-10-CM

## 2019-04-04 PROBLEM — Z12.11 SCREENING FOR MALIGNANT NEOPLASM OF COLON: Status: RESOLVED | Noted: 2018-06-05 | Resolved: 2019-04-04

## 2019-04-04 PROBLEM — R05.3 CHRONIC COUGH: Status: RESOLVED | Noted: 2018-02-08 | Resolved: 2019-04-04

## 2019-04-04 PROBLEM — K57.30 DIVERTICULOSIS OF LARGE INTESTINE WITHOUT DIVERTICULITIS: Status: RESOLVED | Noted: 2018-07-30 | Resolved: 2019-04-04

## 2019-04-04 PROCEDURE — 99397 PER PM REEVAL EST PAT 65+ YR: CPT | Performed by: INTERNAL MEDICINE

## 2019-04-04 PROCEDURE — 36415 COLL VENOUS BLD VENIPUNCTURE: CPT

## 2019-04-04 PROCEDURE — 86803 HEPATITIS C AB TEST: CPT

## 2019-04-04 PROCEDURE — G0439 PPPS, SUBSEQ VISIT: HCPCS | Performed by: INTERNAL MEDICINE

## 2019-04-04 PROCEDURE — 96160 PT-FOCUSED HLTH RISK ASSMT: CPT | Performed by: INTERNAL MEDICINE

## 2019-04-04 PROCEDURE — 84443 ASSAY THYROID STIM HORMONE: CPT

## 2019-04-04 RX ORDER — SILDENAFIL 100 MG/1
100 TABLET, FILM COATED ORAL
Qty: 5 TABLET | Refills: 2 | Status: SHIPPED | OUTPATIENT
Start: 2019-04-04 | End: 2021-02-15

## 2019-04-04 NOTE — PATIENT INSTRUCTIONS
Lucy Gonzalez SCREENING SCHEDULE   Tests on this list are recommended by your physician but may not be covered, or covered at this frequency, by your insurer. Please check with your insurance carrier before scheduling to verify coverage.     Jeff Ramos lifetime   • Anyone with a family history    Colorectal Cancer Screening Covered up to Age 76     Colonoscopy Screen   Covered every 10 years- more often if abnormal Colonoscopy due on 06/14/2028 Update [facility] if applicable    Flex Sigmoidoscop B) No orders found for this or any previous visit. This may be covered with your prescription benefits, but Medicare does not cover unless Medically needed    Zoster (Not covered by Medicare Part B) No orders found for this or any previous visit.  This may

## 2019-04-04 NOTE — PROGRESS NOTES
HPI:   Beatrice Lee is a 68year old male who presents for a MA (Medicare Advantage) Supervisit (Once per calendar year).     He has a chronic shallow tickly cough, normal CXR, using nasal spary and it helped some  He had CABG times 4 some years ago a forms available to patient in AVS         He smoked tobacco in the past but quit greater than 12 months ago.   Social History    Tobacco Use      Smoking status: Former Smoker        Years: 20.00        Types: Cigarettes        Quit date: 3/18/2005        Y Erectile Dysfunction. Metoprolol Succinate ER 25 MG Oral Tablet 24 Hr Take 1 tablet (25 mg total) by mouth once daily. ezetimibe 10 MG Oral Tab Take 1 tablet (10 mg total) by mouth once daily.    LOSARTAN POTASSIUM 25 MG Oral Tab TAKE 1 TABLET BY MOUTH traveling up the left side a little.  He still has nocturia every 2-3, stream is fine, some urgency, occ dribblling  Had TURP 2017  Integument/breast: he has some areas of concern on his face, seeing derm soon  Hematologic/lymphatic: low platelet count for or lesions   Lymph nodes: Cervical, supraclavicular, and axillary nodes normal   Neurologic: Normal             Vaccination History     Immunization History   Administered Date(s) Administered   • Influenza 11/01/2015, 10/01/2017   • Pneumococcal (Prevnar Internal Lab or Procedure External Lab or Procedure   Diabetes Screening      HbgA1C   Annually HgbA1C (%)   Date Value   12/02/2015 5.3       No flowsheet data found.     Fasting Blood Sugar (FSB)Annually Glucose (mg/dL)   Date Value   11/27/2018 82   12/1 with your prescription benefits, but Medicare does not cover unless Medically needed    Zoster   Not covered by Medicare Part B No vaccine history found This may be covered with your pharmacy  prescription benefits      [address] [facility] Internal

## 2019-04-17 RX ORDER — LEVOTHYROXINE SODIUM 175 UG/1
TABLET ORAL
Qty: 90 TABLET | Refills: 1 | Status: SHIPPED | OUTPATIENT
Start: 2019-04-17 | End: 2019-11-05

## 2019-05-16 ENCOUNTER — MED REC SCAN ONLY (OUTPATIENT)
Dept: INTERNAL MEDICINE CLINIC | Facility: CLINIC | Age: 74
End: 2019-05-16

## 2019-09-20 ENCOUNTER — OFFICE VISIT (OUTPATIENT)
Dept: INTERNAL MEDICINE CLINIC | Facility: CLINIC | Age: 74
End: 2019-09-20
Payer: COMMERCIAL

## 2019-09-20 ENCOUNTER — HOSPITAL ENCOUNTER (OUTPATIENT)
Dept: GENERAL RADIOLOGY | Age: 74
Discharge: HOME | End: 2019-09-20
Attending: INTERNAL MEDICINE
Payer: MEDICARE

## 2019-09-20 VITALS
HEIGHT: 67.25 IN | BODY MASS INDEX: 29.13 KG/M2 | RESPIRATION RATE: 14 BRPM | WEIGHT: 187.81 LBS | SYSTOLIC BLOOD PRESSURE: 110 MMHG | OXYGEN SATURATION: 97 % | HEART RATE: 58 BPM | TEMPERATURE: 98 F | DIASTOLIC BLOOD PRESSURE: 62 MMHG

## 2019-09-20 DIAGNOSIS — J18.9 PNEUMONIA OF BOTH LOWER LOBES DUE TO INFECTIOUS ORGANISM: ICD-10-CM

## 2019-09-20 DIAGNOSIS — J18.9 PNEUMONIA OF BOTH LOWER LOBES DUE TO INFECTIOUS ORGANISM: Primary | ICD-10-CM

## 2019-09-20 PROCEDURE — 71046 X-RAY EXAM CHEST 2 VIEWS: CPT | Performed by: INTERNAL MEDICINE

## 2019-09-20 PROCEDURE — 99214 OFFICE O/P EST MOD 30 MIN: CPT | Performed by: INTERNAL MEDICINE

## 2019-09-20 RX ORDER — AZITHROMYCIN 250 MG/1
TABLET, FILM COATED ORAL
Qty: 6 TABLET | Refills: 0 | Status: SHIPPED | OUTPATIENT
Start: 2019-09-20 | End: 2019-09-26 | Stop reason: ALTCHOICE

## 2019-09-20 RX ORDER — CODEINE PHOSPHATE AND GUAIFENESIN 10; 100 MG/5ML; MG/5ML
10 SOLUTION ORAL 4 TIMES DAILY PRN
Qty: 180 ML | Refills: 0 | Status: SHIPPED | OUTPATIENT
Start: 2019-09-20 | End: 2019-09-26 | Stop reason: ALTCHOICE

## 2019-09-20 RX ORDER — TAMSULOSIN HYDROCHLORIDE 0.4 MG/1
0.4 CAPSULE ORAL
Refills: 11 | COMMUNITY
Start: 2019-09-13 | End: 2020-11-17

## 2019-09-20 NOTE — PROGRESS NOTES
Qian Martinez is a 76year old male  Patient presents with:  Cough: Dry  Runny Nose  Body ache and/or chills  Headache  Neck Pain      HPI:   URI for 1 week  Worse this week  Sneezing, coughing from the throat, dry, paroxysms, using nyquil for some slee Multiple Vitamins-Minerals (CENTRUM CARDIO OR) Take  by mouth.  Disp:  Rfl:       Patient Active Problem List:     Hypothyroid     Coronary artery disease involving native coronary artery of native heart without angina pectoris     Mixed hyperlipidemia Visit:  Requested Prescriptions     Signed Prescriptions Disp Refills   • azithromycin (ZITHROMAX Z-NILESH) 250 MG Oral Tab 6 tablet 0     Sig: Take two tablets by mouth today, then one tablet daily.    • guaiFENesin-codeine (CHERATUSSIN AC) 100-10 MG/5ML Oral

## 2019-10-11 ENCOUNTER — TELEPHONE (OUTPATIENT)
Dept: INTERNAL MEDICINE CLINIC | Facility: CLINIC | Age: 74
End: 2019-10-11

## 2019-10-11 NOTE — TELEPHONE ENCOUNTER
Patient called, asking if he is due for a pneumonia vaccine. Per immunizations tab, patient received Prevnar 13 5/12/16 at age 70, and Pneumovax 21 10/5/11 at age 77. Patient noted with history of CAD and hyperlipidemia.  I believe he is not due for any pne

## 2019-10-11 NOTE — TELEPHONE ENCOUNTER
Patient called he wanted to know if he is due for the Pneumococcal (Prevnar 13) vaccine? Please call patient to inform and or if patient is due, please route to PSRs and we can call and schedule  Thank you!

## 2019-10-11 NOTE — TELEPHONE ENCOUNTER
Patient is not due for pneumonia vaccine of any type. Is due for flu vaccine. Please call him to schedule. Thank you!

## 2019-10-14 ENCOUNTER — TELEPHONE (OUTPATIENT)
Dept: INTERNAL MEDICINE CLINIC | Facility: CLINIC | Age: 74
End: 2019-10-14

## 2019-10-14 DIAGNOSIS — L57.0 ACTINIC KERATOSES: Primary | ICD-10-CM

## 2019-10-14 NOTE — TELEPHONE ENCOUNTER
Incoming (mail or fax):  fax  Received from:  [facility] Dermatology  Documentation given to:  triage

## 2019-10-14 NOTE — TELEPHONE ENCOUNTER
Received fax from [facility], Dr. Christen Sultana' office. Requesting renewed referral order. Noted last referral entered 3/19/19, expires 9/16/19. Pt has appointment with Dr. Christen Sultana 10/29/19. Pending referral order for Dr. Christen Sultana, 3 visits.   To

## 2019-10-14 NOTE — TELEPHONE ENCOUNTER
FARNAZ called patient to schedule flu vaccine pt stated she already received vaccine elsewhere informed no pneumonia vaccine needed at this time.

## 2019-10-15 NOTE — TELEPHONE ENCOUNTER
Referral has been Completed, Faxed and Confirmation Received by Dr. Marilyn Cooper. Did note on the Fax Cover Sheet that Insurance Approval in Still Pending.

## 2019-10-23 ENCOUNTER — HOSPITAL ENCOUNTER (EMERGENCY)
Facility: HOSPITAL | Age: 74
Discharge: HOME | End: 2019-10-23
Attending: EMERGENCY MEDICINE
Payer: MEDICARE

## 2019-10-23 ENCOUNTER — APPOINTMENT (OUTPATIENT)
Dept: GENERAL RADIOLOGY | Facility: HOSPITAL | Age: 74
End: 2019-10-23
Payer: MEDICARE

## 2019-10-23 VITALS
OXYGEN SATURATION: 95 % | DIASTOLIC BLOOD PRESSURE: 73 MMHG | SYSTOLIC BLOOD PRESSURE: 136 MMHG | HEIGHT: 69 IN | WEIGHT: 185 LBS | TEMPERATURE: 98 F | RESPIRATION RATE: 21 BRPM | HEART RATE: 46 BPM | BODY MASS INDEX: 27.4 KG/M2

## 2019-10-23 DIAGNOSIS — R07.9 CHEST PAIN, UNSPECIFIED TYPE: Primary | ICD-10-CM

## 2019-10-23 PROCEDURE — 80053 COMPREHEN METABOLIC PANEL: CPT | Performed by: EMERGENCY MEDICINE

## 2019-10-23 PROCEDURE — 99284 EMERGENCY DEPT VISIT MOD MDM: CPT

## 2019-10-23 PROCEDURE — 85025 COMPLETE CBC W/AUTO DIFF WBC: CPT | Performed by: EMERGENCY MEDICINE

## 2019-10-23 PROCEDURE — 85379 FIBRIN DEGRADATION QUANT: CPT | Performed by: EMERGENCY MEDICINE

## 2019-10-23 PROCEDURE — 84484 ASSAY OF TROPONIN QUANT: CPT | Performed by: EMERGENCY MEDICINE

## 2019-10-23 PROCEDURE — 96374 THER/PROPH/DIAG INJ IV PUSH: CPT

## 2019-10-23 PROCEDURE — 71045 X-RAY EXAM CHEST 1 VIEW: CPT | Performed by: EMERGENCY MEDICINE

## 2019-10-23 PROCEDURE — 99285 EMERGENCY DEPT VISIT HI MDM: CPT

## 2019-10-23 PROCEDURE — 85025 COMPLETE CBC W/AUTO DIFF WBC: CPT

## 2019-10-23 PROCEDURE — 93010 ELECTROCARDIOGRAM REPORT: CPT

## 2019-10-23 PROCEDURE — 93005 ELECTROCARDIOGRAM TRACING: CPT

## 2019-10-23 RX ORDER — NITROGLYCERIN 0.4 MG/1
0.4 TABLET SUBLINGUAL ONCE
Status: COMPLETED | OUTPATIENT
Start: 2019-10-23 | End: 2019-10-23

## 2019-10-23 RX ORDER — TRAMADOL HYDROCHLORIDE 50 MG/1
TABLET ORAL EVERY 6 HOURS PRN
Qty: 10 TABLET | Refills: 0 | Status: SHIPPED | OUTPATIENT
Start: 2019-10-23 | End: 2019-10-30

## 2019-10-23 RX ORDER — KETOROLAC TROMETHAMINE 30 MG/ML
15 INJECTION, SOLUTION INTRAMUSCULAR; INTRAVENOUS ONCE
Status: COMPLETED | OUTPATIENT
Start: 2019-10-23 | End: 2019-10-23

## 2019-10-23 RX ORDER — ASPIRIN 81 MG/1
324 TABLET, CHEWABLE ORAL ONCE
Status: COMPLETED | OUTPATIENT
Start: 2019-10-23 | End: 2019-10-23

## 2019-10-23 NOTE — ED PROVIDER NOTES
Patient Seen in: [facility] Emergency Department      History   Patient presents with:  Chest Pain Angina (cardiovascular)    Stated Complaint: cp    MARCELLO    Jeannette Benson is a pleasant 70-year-old male presenting to emerge from chest pain.   He says he wa Alcohol/week: 1.0 standard drinks      Types: 1 Standard drinks or equivalent per week      Comment: Daily 1-2 drinks daily    Drug use: No             Review of Systems    Positive for stated complaint: cp  Other systems are as noted in HPI.   Constitution Units.    D-Dimer results of less than 0.5 ug/mL (FEU) have been shown to contribute to the exclusion of venous thromboembolism with a negative predictive value of approximately 95% when results are used as part of the total clinical evaluation of the tricia

## 2019-10-24 ENCOUNTER — PATIENT MESSAGE (OUTPATIENT)
Dept: INTERNAL MEDICINE CLINIC | Facility: CLINIC | Age: 74
End: 2019-10-24

## 2019-10-24 NOTE — PROGRESS NOTES
[employer] message sent to pt to call office to schedule ER follow-up in 1 week as instructed in discharge notes from ER. Is SDA ok? Only SDA slots until last week of November. Or schedule ER follow-up with other provider? Please advise, thanks!

## 2019-10-27 ENCOUNTER — PATIENT MESSAGE (OUTPATIENT)
Dept: INTERNAL MEDICINE CLINIC | Facility: CLINIC | Age: 74
End: 2019-10-27

## 2019-10-28 NOTE — PROGRESS NOTES
LMTCB to reschedule ER follow-up. ETHEL waters, per Dr. Carlo Fontanez. [employer] message also sent back. PSR - Please schedule pt on 10/31/19 as requested on pt's [facility] Directions message. ETHEL waters, per Dr. Carlo Fontanez.

## 2019-10-28 NOTE — PROGRESS NOTES
Pt sent [employer] message stating he cancelled tomorrow's 10/29/19 OV d/t schedule conflict. Pt asking if ok to reschedule to 10/31. Only SDA slots available. Okay to schedule SDA? Please advise, thanks!

## 2019-10-28 NOTE — TELEPHONE ENCOUNTER
From: [place]  To: Destiny Dawson MD  Sent: 10/27/2019 8:53 PM CDT  Subject: Other    Canceled apt. of [address] due to a conflict would like to reschule to Thursday 10/31 if that is convient to you?  If not I will call office and work with you

## 2019-10-31 ENCOUNTER — TELEPHONE (OUTPATIENT)
Dept: INTERNAL MEDICINE CLINIC | Facility: CLINIC | Age: 74
End: 2019-10-31

## 2019-10-31 ENCOUNTER — HOSPITAL ENCOUNTER (OUTPATIENT)
Dept: GENERAL RADIOLOGY | Age: 74
Discharge: HOME | End: 2019-10-31
Attending: INTERNAL MEDICINE
Payer: MEDICARE

## 2019-10-31 ENCOUNTER — OFFICE VISIT (OUTPATIENT)
Dept: INTERNAL MEDICINE CLINIC | Facility: CLINIC | Age: 74
End: 2019-10-31
Payer: COMMERCIAL

## 2019-10-31 VITALS
BODY MASS INDEX: 29.64 KG/M2 | RESPIRATION RATE: 16 BRPM | HEART RATE: 54 BPM | HEIGHT: 67 IN | WEIGHT: 188.88 LBS | DIASTOLIC BLOOD PRESSURE: 78 MMHG | OXYGEN SATURATION: 98 % | SYSTOLIC BLOOD PRESSURE: 124 MMHG

## 2019-10-31 DIAGNOSIS — I25.10 CORONARY ARTERY DISEASE INVOLVING NATIVE CORONARY ARTERY OF NATIVE HEART WITHOUT ANGINA PECTORIS: ICD-10-CM

## 2019-10-31 DIAGNOSIS — R93.89 ABNORMAL CXR (CHEST X-RAY): ICD-10-CM

## 2019-10-31 DIAGNOSIS — R93.89 ABNORMAL CXR (CHEST X-RAY): Primary | ICD-10-CM

## 2019-10-31 DIAGNOSIS — R07.89 ATYPICAL CHEST PAIN: Primary | ICD-10-CM

## 2019-10-31 PROCEDURE — 71046 X-RAY EXAM CHEST 2 VIEWS: CPT | Performed by: INTERNAL MEDICINE

## 2019-10-31 PROCEDURE — 99214 OFFICE O/P EST MOD 30 MIN: CPT | Performed by: INTERNAL MEDICINE

## 2019-10-31 NOTE — PROGRESS NOTES
Spoke to patient, aware of results and recommendations. Orders entered for BNP and echocardiogram entered (lab updated to [facility] per patient's request). Advised patient that insurance authorization for echo may take a few days.  Patient given phone number t

## 2019-10-31 NOTE — TELEPHONE ENCOUNTER
Spoke to pt. Made aware of recommendation to repeat CXR. Pt voiced understanding & stated will complete later today.

## 2019-10-31 NOTE — TELEPHONE ENCOUNTER
Pt was here for ER f/u today and after he left I noticed the Chest xray was reported as abnormal c/w CHF  Pt clinically has no CHF and never did but I would like him to repeat CXR in the standard PA and LAT views to be sure nothing else could have been dev

## 2019-10-31 NOTE — PROGRESS NOTES
David Cobos is a 76year old male  Patient presents with:  ER F/U: Chest Pain - Right Side, still has a \"twinge\" of pain here and there but not like before, some palpitations, some lightheaded at times based on movements      HPI:   Went to ER for s 3  ezetimibe 10 MG Oral Tab, Take 1 tablet (10 mg total) by mouth once daily. , Disp: 90 tablet, Rfl: 3  POTASSIUM OR, Take by mouth as needed. , Disp: , Rfl:   aspirin 81 MG Oral Tab, Take 81 mg by mouth daily. , Disp: , Rfl:   Multiple Vitamins-Minerals ( - 5.1 mmol/L    Chloride 111 98 - 112 mmol/L    CO2 25.0 21.0 - 32.0 mmol/L    Anion Gap 6 0 - 18 mmol/L    BUN 17 7 - 18 mg/dL    Creatinine 0.98 0.70 - 1.30 mg/dL    BUN/CREA Ratio 17.3 10.0 - 20.0    Calcium, Total 8.8 8.5 - 10.1 mg/dL    Calculated Osm - 0.20 x10(3) uL    Immature Granulocyte Absolute 0.00 0.00 - 1.00 x10(3) uL    Neutrophil % 57.4 %    Lymphocyte % 31.0 %    Monocyte % 9.9 %    Eosinophil % 1.3 %    Basophil % 0.4 %    Immature Granulocyte % 0.0 %     Xr Chest Ap Portable  (cpt=71045)

## 2019-11-01 ENCOUNTER — APPOINTMENT (OUTPATIENT)
Dept: LAB | Age: 74
End: 2019-11-01
Attending: INTERNAL MEDICINE
Payer: MEDICARE

## 2019-11-01 DIAGNOSIS — R93.89 ABNORMAL CXR (CHEST X-RAY): ICD-10-CM

## 2019-11-01 PROCEDURE — 36415 COLL VENOUS BLD VENIPUNCTURE: CPT

## 2019-11-01 PROCEDURE — 83880 ASSAY OF NATRIURETIC PEPTIDE: CPT

## 2019-11-05 ENCOUNTER — MED REC SCAN ONLY (OUTPATIENT)
Dept: INTERNAL MEDICINE CLINIC | Facility: CLINIC | Age: 74
End: 2019-11-05

## 2019-11-05 DIAGNOSIS — E03.9 ACQUIRED HYPOTHYROIDISM: Primary | ICD-10-CM

## 2019-11-06 RX ORDER — LEVOTHYROXINE SODIUM 175 UG/1
TABLET ORAL
Qty: 90 TABLET | Refills: 1 | Status: SHIPPED | OUTPATIENT
Start: 2019-11-06 | End: 2020-05-23

## 2019-11-13 ENCOUNTER — HOSPITAL ENCOUNTER (OUTPATIENT)
Dept: CV DIAGNOSTICS | Facility: HOSPITAL | Age: 74
Discharge: HOME | End: 2019-11-13
Attending: INTERNAL MEDICINE
Payer: MEDICARE

## 2019-11-13 DIAGNOSIS — R93.89 ABNORMAL CXR (CHEST X-RAY): ICD-10-CM

## 2019-11-13 PROCEDURE — 93306 TTE W/DOPPLER COMPLETE: CPT | Performed by: INTERNAL MEDICINE

## 2020-05-21 DIAGNOSIS — E03.9 ACQUIRED HYPOTHYROIDISM: ICD-10-CM

## 2020-05-21 NOTE — TELEPHONE ENCOUNTER
Last OV relevant to medication: 4/4/19  Last refill date: 11/6/19     #/refills: 90/1  When pt was asked to return for OV: none noted  Upcoming appt/reason: none    Nhanhart sent advising pt to call office to schedule annual supervisit.     Free T4 (ng/dL)

## 2020-05-21 NOTE — TELEPHONE ENCOUNTER
PSR - please follow up with pt to schedule his annual supervisit, is over-due. Thank you! Rx is on hold. mychart was sent to pt today as well.

## 2020-05-23 RX ORDER — LEVOTHYROXINE SODIUM 175 UG/1
TABLET ORAL
Qty: 90 TABLET | Refills: 0 | Status: SHIPPED | OUTPATIENT
Start: 2020-05-23 | End: 2020-08-18

## 2020-06-23 ENCOUNTER — VIRTUAL PHONE E/M (OUTPATIENT)
Dept: INTERNAL MEDICINE CLINIC | Facility: CLINIC | Age: 75
End: 2020-06-23
Payer: COMMERCIAL

## 2020-06-23 ENCOUNTER — TELEPHONE (OUTPATIENT)
Dept: INTERNAL MEDICINE CLINIC | Facility: CLINIC | Age: 75
End: 2020-06-23

## 2020-06-23 DIAGNOSIS — J02.9 SORE THROAT: Primary | ICD-10-CM

## 2020-06-23 DIAGNOSIS — Z91.09 ENVIRONMENTAL ALLERGIES: ICD-10-CM

## 2020-06-23 PROCEDURE — 99442 PHONE E/M BY PHYS 11-20 MIN: CPT | Performed by: NURSE PRACTITIONER

## 2020-06-23 NOTE — PATIENT INSTRUCTIONS
Start an allergy medication like Flonase or Claritin. Use consistently for one week and monitor effectiveness. You can use tylenol as needed for pain and Cepacol lozenges for sore throat.      Also monitor for other signs of COVID including: fever, chil

## 2020-06-23 NOTE — PROGRESS NOTES
Virtual Telephone Check-In    Curtis Morgan verbally consents to a Virtual/Telephone Check-In visit on 06/23/20. Patient has been referred to the [facility] website at [email or web address] to review the yearly Consent to Treat document.     Patient unders 3   • Sildenafil Citrate (VIAGRA) 100 MG Oral Tab Take 1 tablet (100 mg total) by mouth daily as needed for Erectile Dysfunction. 5 tablet 2   • POTASSIUM OR Take by mouth as needed. • aspirin 81 MG Oral Tab Take 81 mg by mouth daily.      • Multiple 10/23/2019     10/23/2019    K 3.8 10/23/2019     10/23/2019    CO2 25.0 10/23/2019      Lab Results   Component Value Date    CHOLEST 130 11/27/2018    TRIG 57 11/27/2018    HDL 68 (H) 11/27/2018    LDL 51 11/27/2018    VLDL 11 11/27/2018    T

## 2020-06-23 NOTE — TELEPHONE ENCOUNTER
Patient has had a sore throat since yesterday. He scheduled a TV with Ramiro Cunningham today and can be reached at [phone]    Virtual/Telephone Consent    Cortney Del Rio verbally consents to a Virtual/Telephone Check-In service on 6/23/20.   Patient understand

## 2020-07-14 PROBLEM — L72.8 OTHER FOLLICULAR CYSTS OF THE SKIN AND SUBCUTANEOUS TISSUE: Status: ACTIVE | Noted: 2019-10-29

## 2020-07-14 PROBLEM — D22.9 MULTIPLE BENIGN NEVI: Status: ACTIVE | Noted: 2019-10-29

## 2020-07-14 PROBLEM — M48.061 LUMBAR SPINAL STENOSIS: Status: ACTIVE | Noted: 2020-07-14

## 2020-07-14 PROBLEM — L57.0 ACTINIC KERATOSES: Status: ACTIVE | Noted: 2019-10-29

## 2020-07-14 PROBLEM — L81.4 LENTIGINES: Status: ACTIVE | Noted: 2019-10-29

## 2020-07-23 ENCOUNTER — TELEPHONE (OUTPATIENT)
Dept: INTERNAL MEDICINE CLINIC | Facility: CLINIC | Age: 75
End: 2020-07-23

## 2020-07-23 DIAGNOSIS — G96.191 TARLOV CYSTS: ICD-10-CM

## 2020-07-23 DIAGNOSIS — L57.0 ACTINIC KERATOSES: Primary | ICD-10-CM

## 2020-07-23 NOTE — TELEPHONE ENCOUNTER
Functional Status Done?:                           Provider's Name?:  Dr Kendal Hurtado?:  Dermatology  Reason for Visit?:  FU  Diagnosis?:  Syst, and Keratoses  Number of Visits Requested?: 6  Last Visit with Specialist?:  10/29/2019  Is Ap

## 2020-07-23 NOTE — TELEPHONE ENCOUNTER
Is pt up to date on OVs with PCP?: yes  Has pt been seen/referred for condition?: yes  Is specialist in network?: yes  Health Maintenance up to date?: no         If no, reminder given?: yes; pt is schedule for Supervisit on 8/3/2020    Referral placed per

## 2020-08-01 ENCOUNTER — HOSPITAL (OUTPATIENT)
Dept: OTHER | Age: 75
End: 2020-08-01
Attending: INTERNAL MEDICINE

## 2020-08-03 ENCOUNTER — OFFICE VISIT (OUTPATIENT)
Dept: INTERNAL MEDICINE CLINIC | Facility: CLINIC | Age: 75
End: 2020-08-03
Payer: COMMERCIAL

## 2020-08-03 ENCOUNTER — TELEPHONE (OUTPATIENT)
Dept: INTERNAL MEDICINE CLINIC | Facility: CLINIC | Age: 75
End: 2020-08-03

## 2020-08-03 VITALS
OXYGEN SATURATION: 97 % | HEIGHT: 67.32 IN | DIASTOLIC BLOOD PRESSURE: 72 MMHG | HEART RATE: 51 BPM | TEMPERATURE: 98 F | RESPIRATION RATE: 14 BRPM | WEIGHT: 187.19 LBS | BODY MASS INDEX: 29.04 KG/M2 | SYSTOLIC BLOOD PRESSURE: 116 MMHG

## 2020-08-03 DIAGNOSIS — R39.9 LOWER URINARY TRACT SYMPTOMS (LUTS): ICD-10-CM

## 2020-08-03 DIAGNOSIS — I70.0 ATHEROSCLEROSIS OF AORTA (HCC): ICD-10-CM

## 2020-08-03 DIAGNOSIS — M47.817 FACET ARTHRITIS OF LUMBOSACRAL REGION: ICD-10-CM

## 2020-08-03 DIAGNOSIS — Z00.00 ENCOUNTER FOR ANNUAL HEALTH EXAMINATION: Primary | ICD-10-CM

## 2020-08-03 DIAGNOSIS — E78.00 PURE HYPERCHOLESTEROLEMIA: ICD-10-CM

## 2020-08-03 DIAGNOSIS — Z13.5 GLAUCOMA SCREENING: ICD-10-CM

## 2020-08-03 DIAGNOSIS — Z91.81 AT RISK FOR FALLING: ICD-10-CM

## 2020-08-03 DIAGNOSIS — I25.10 CORONARY ARTERY DISEASE INVOLVING NATIVE CORONARY ARTERY OF NATIVE HEART WITHOUT ANGINA PECTORIS: ICD-10-CM

## 2020-08-03 DIAGNOSIS — E03.9 ACQUIRED HYPOTHYROIDISM: ICD-10-CM

## 2020-08-03 DIAGNOSIS — R00.2 PALPITATIONS: ICD-10-CM

## 2020-08-03 DIAGNOSIS — D69.6 THROMBOCYTOPENIA (HCC): ICD-10-CM

## 2020-08-03 PROBLEM — L72.8 OTHER FOLLICULAR CYSTS OF THE SKIN AND SUBCUTANEOUS TISSUE: Status: RESOLVED | Noted: 2019-10-29 | Resolved: 2020-08-03

## 2020-08-03 PROBLEM — D22.9 MULTIPLE BENIGN NEVI: Status: RESOLVED | Noted: 2019-10-29 | Resolved: 2020-08-03

## 2020-08-03 PROBLEM — M47.816 ARTHRITIS, LUMBAR SPINE: Status: RESOLVED | Noted: 2018-05-21 | Resolved: 2020-08-03

## 2020-08-03 PROBLEM — M48.061 LUMBAR SPINAL STENOSIS: Status: RESOLVED | Noted: 2020-07-14 | Resolved: 2020-08-03

## 2020-08-03 PROBLEM — L57.0 ACTINIC KERATOSES: Status: RESOLVED | Noted: 2019-10-29 | Resolved: 2020-08-03

## 2020-08-03 PROBLEM — L81.4 LENTIGINES: Status: RESOLVED | Noted: 2019-10-29 | Resolved: 2020-08-03

## 2020-08-03 PROCEDURE — 99397 PER PM REEVAL EST PAT 65+ YR: CPT | Performed by: INTERNAL MEDICINE

## 2020-08-03 PROCEDURE — 3008F BODY MASS INDEX DOCD: CPT | Performed by: INTERNAL MEDICINE

## 2020-08-03 PROCEDURE — 3074F SYST BP LT 130 MM HG: CPT | Performed by: INTERNAL MEDICINE

## 2020-08-03 PROCEDURE — G0439 PPPS, SUBSEQ VISIT: HCPCS | Performed by: INTERNAL MEDICINE

## 2020-08-03 PROCEDURE — 3078F DIAST BP <80 MM HG: CPT | Performed by: INTERNAL MEDICINE

## 2020-08-03 PROCEDURE — 96160 PT-FOCUSED HLTH RISK ASSMT: CPT | Performed by: INTERNAL MEDICINE

## 2020-08-03 RX ORDER — PANTOPRAZOLE SODIUM 40 MG/1
40 TABLET, DELAYED RELEASE ORAL DAILY PRN
Refills: 0 | COMMUNITY
Start: 2020-08-03 | End: 2021-08-12 | Stop reason: ALTCHOICE

## 2020-08-03 NOTE — TELEPHONE ENCOUNTER
Patient had answered cough in his travel screen on my chart. Patient was called, patient returned call and stated that he has had this cough since last fall, its on and off regarding his allergies.   Patient has no other symptoms, has not traveled and ha

## 2020-08-03 NOTE — PROGRESS NOTES
HPI:   Rian Calero is a 76year old male who presents for a MA (Medicare Advantage) Supervisit (Once per calendar year).     He has a chronic shallow tickly cough, normal CXR, using nasal spary and it helped some  He had CABG times 4 some years ago a LAST 2 WEEKS   Little interest or pleasure in doing things (over the last two weeks)?: Not at all  Feeling down, depressed, or hopeless (over the last two weeks)?: Not at all  PHQ-2 SCORE: 0     Advanced Directive:   He does NOT have a Living Will on file (83.9 kg)     Last Cholesterol Labs:   Lab Results   Component Value Date    CHOLEST 130 11/27/2018    HDL 68 (H) 11/27/2018    LDL 51 11/27/2018    TRIG 57 11/27/2018          Last Chemistry Labs:   Lab Results   Component Value Date    AST 21 10/23/2019 and Visual impairment. He  has a past surgical history that includes angioplasty (coronary) (3/2015); cabg (2005); tonsillectomy; other (2009); other (03/16/2016); colonoscopy (2019); and other surgical history (2017).     His family history includes Hea Eyes:  PERRL, conjunctiva/corneas clear, EOM's intact, both eyes   Ears:  Normal TM's and external ear canals, both ears           Neck: Supple, symmetrical, trachea midline, no adenopathy, thyroid: not enlarged, symmetric, no tenderness/mass/nodules, no INTERNAL, PSA SCREEN        See gu    (E03.9) Acquired hypothyroidism  Plan: TSH W REFLEX TO FREE T4            (E78.00) Pure hypercholesterolemia  Plan: COMP METABOLIC PANEL (14), LIPID PANEL            (Z13.5) Glaucoma screening  Plan: OPHTHALMOLOGY - IN Flex Sigmoidoscopy Screen every 10 years No results found for this or any previous visit. No flowsheet data found. Fecal Occult Blood Annually No results found for: FOB No flowsheet data found.     Glaucoma Screening      Ophthalmology Visit Annually: D DIGOXIN, DIG, VALP No flowsheet data found.

## 2020-08-03 NOTE — PATIENT INSTRUCTIONS
[facility] SCREENING SCHEDULE   Tests on this list are recommended by your physician but may not be covered, or covered at this frequency, by your insurer. Please check with your insurance carrier before scheduling to verify coverage.     Shakira Camara lifetime   • Anyone with a family history    Colorectal Cancer Screening Covered up to Age 76     Colonoscopy Screen   Covered every 10 years- more often if abnormal Colonoscopy due on 06/14/2028 Update [facility] if applicable    Flex Sigmoidoscop B) No orders found for this or any previous visit. This may be covered with your prescription benefits, but Medicare does not cover unless Medically needed    Zoster (Not covered by Medicare Part B) No orders found for this or any previous visit.  This may

## 2020-08-06 ENCOUNTER — TELEPHONE (OUTPATIENT)
Dept: INTERNAL MEDICINE CLINIC | Facility: CLINIC | Age: 75
End: 2020-08-06

## 2020-08-08 ENCOUNTER — LAB ENCOUNTER (OUTPATIENT)
Dept: LAB | Facility: HOSPITAL | Age: 75
End: 2020-08-08
Attending: INTERNAL MEDICINE
Payer: MEDICARE

## 2020-08-08 ENCOUNTER — MED REC SCAN ONLY (OUTPATIENT)
Dept: INTERNAL MEDICINE CLINIC | Facility: CLINIC | Age: 75
End: 2020-08-08

## 2020-08-08 DIAGNOSIS — E78.00 PURE HYPERCHOLESTEROLEMIA: ICD-10-CM

## 2020-08-08 DIAGNOSIS — E03.9 ACQUIRED HYPOTHYROIDISM: ICD-10-CM

## 2020-08-08 DIAGNOSIS — R39.9 LOWER URINARY TRACT SYMPTOMS (LUTS): ICD-10-CM

## 2020-08-08 DIAGNOSIS — D69.6 THROMBOCYTOPENIA (HCC): ICD-10-CM

## 2020-08-08 LAB
ALBUMIN SERPL-MCNC: 3.8 G/DL (ref 3.4–5)
ALBUMIN/GLOB SERPL: 1.3 {RATIO} (ref 1–2)
ALP LIVER SERPL-CCNC: 60 U/L (ref 45–117)
ALT SERPL-CCNC: 46 U/L (ref 16–61)
ANION GAP SERPL CALC-SCNC: 1 MMOL/L (ref 0–18)
AST SERPL-CCNC: 21 U/L (ref 15–37)
BASOPHILS # BLD AUTO: 0.04 X10(3) UL (ref 0–0.2)
BASOPHILS NFR BLD AUTO: 0.9 %
BILIRUB SERPL-MCNC: 1.4 MG/DL (ref 0.1–2)
BUN BLD-MCNC: 18 MG/DL (ref 7–18)
BUN/CREAT SERPL: 20.5 (ref 10–20)
CALCIUM BLD-MCNC: 8.8 MG/DL (ref 8.5–10.1)
CHLORIDE SERPL-SCNC: 111 MMOL/L (ref 98–112)
CHOLEST SMN-MCNC: 140 MG/DL (ref ?–200)
CO2 SERPL-SCNC: 30 MMOL/L (ref 21–32)
COMPLEXED PSA SERPL-MCNC: 1.38 NG/ML (ref ?–4)
CREAT BLD-MCNC: 0.88 MG/DL (ref 0.7–1.3)
DEPRECATED RDW RBC AUTO: 44 FL (ref 35.1–46.3)
EOSINOPHIL # BLD AUTO: 0.16 X10(3) UL (ref 0–0.7)
EOSINOPHIL NFR BLD AUTO: 3.5 %
ERYTHROCYTE [DISTWIDTH] IN BLOOD BY AUTOMATED COUNT: 12.4 % (ref 11–15)
GLOBULIN PLAS-MCNC: 2.9 G/DL (ref 2.8–4.4)
GLUCOSE BLD-MCNC: 94 MG/DL (ref 70–99)
HCT VFR BLD AUTO: 45.9 % (ref 39–53)
HDLC SERPL-MCNC: 71 MG/DL (ref 40–59)
HGB BLD-MCNC: 16.2 G/DL (ref 13–17.5)
IMM GRANULOCYTES # BLD AUTO: 0.01 X10(3) UL (ref 0–1)
IMM GRANULOCYTES NFR BLD: 0.2 %
LDLC SERPL CALC-MCNC: 57 MG/DL (ref ?–100)
LYMPHOCYTES # BLD AUTO: 1.68 X10(3) UL (ref 1–4)
LYMPHOCYTES NFR BLD AUTO: 37 %
M PROTEIN MFR SERPL ELPH: 6.7 G/DL (ref 6.4–8.2)
MCH RBC QN AUTO: 33.9 PG (ref 26–34)
MCHC RBC AUTO-ENTMCNC: 35.3 G/DL (ref 31–37)
MCV RBC AUTO: 96 FL (ref 80–100)
MONOCYTES # BLD AUTO: 0.49 X10(3) UL (ref 0.1–1)
MONOCYTES NFR BLD AUTO: 10.8 %
NEUTROPHILS # BLD AUTO: 2.16 X10 (3) UL (ref 1.5–7.7)
NEUTROPHILS # BLD AUTO: 2.16 X10(3) UL (ref 1.5–7.7)
NEUTROPHILS NFR BLD AUTO: 47.6 %
NONHDLC SERPL-MCNC: 69 MG/DL (ref ?–130)
OSMOLALITY SERPL CALC.SUM OF ELEC: 296 MOSM/KG (ref 275–295)
PATIENT FASTING Y/N/NP: YES
PATIENT FASTING Y/N/NP: YES
PLATELET # BLD AUTO: 119 10(3)UL (ref 150–450)
POTASSIUM SERPL-SCNC: 4 MMOL/L (ref 3.5–5.1)
RBC # BLD AUTO: 4.78 X10(6)UL (ref 3.8–5.8)
SODIUM SERPL-SCNC: 142 MMOL/L (ref 136–145)
TRIGL SERPL-MCNC: 62 MG/DL (ref 30–149)
TSI SER-ACNC: 0.68 MIU/ML (ref 0.36–3.74)
VLDLC SERPL CALC-MCNC: 12 MG/DL (ref 0–30)
WBC # BLD AUTO: 4.5 X10(3) UL (ref 4–11)

## 2020-08-08 PROCEDURE — 80053 COMPREHEN METABOLIC PANEL: CPT

## 2020-08-08 PROCEDURE — 36415 COLL VENOUS BLD VENIPUNCTURE: CPT

## 2020-08-08 PROCEDURE — 84443 ASSAY THYROID STIM HORMONE: CPT

## 2020-08-08 PROCEDURE — 85025 COMPLETE CBC W/AUTO DIFF WBC: CPT

## 2020-08-08 PROCEDURE — 80061 LIPID PANEL: CPT

## 2020-08-18 DIAGNOSIS — E03.9 ACQUIRED HYPOTHYROIDISM: ICD-10-CM

## 2020-08-18 RX ORDER — LEVOTHYROXINE SODIUM 175 UG/1
TABLET ORAL
Qty: 90 TABLET | Refills: 1 | Status: SHIPPED | OUTPATIENT
Start: 2020-08-18 | End: 2021-03-01

## 2020-08-24 ENCOUNTER — TELEPHONE (OUTPATIENT)
Dept: PHYSICAL THERAPY | Age: 75
End: 2020-08-24

## 2020-08-25 ENCOUNTER — OFFICE VISIT (OUTPATIENT)
Dept: PHYSICAL THERAPY | Facility: HOSPITAL | Age: 75
End: 2020-08-25
Attending: INTERNAL MEDICINE
Payer: MEDICARE

## 2020-08-25 DIAGNOSIS — Z91.81 AT RISK FOR FALLING: ICD-10-CM

## 2020-08-25 PROCEDURE — 97162 PT EVAL MOD COMPLEX 30 MIN: CPT

## 2020-08-25 PROCEDURE — 97110 THERAPEUTIC EXERCISES: CPT

## 2020-08-25 NOTE — PROGRESS NOTES
NEUROLOGICAL/FALL EVALUATION:   Referring Physician: Dr. Wesley Palm  Diagnosis:  At risk for falling      Date of Service: 8/25/2020     PATIENT SUMMARY   Jarrett Godwin is a 76year old male who presents to therapy today with complaints of declining in household and community level activity without balance issue prior to about 1 year ago. Pt goals include to develop a good plan of how to improve, improve balance, improve strength with deep squatting. Past medical history was reviewed with Galilea Hilton. performs HEP correctly without reported pain. Skilled Physical Therapy is medically necessary to address the above impairments and reach functional goals.      Precautions:  Fall Risk  OBJECTIVE:   Observation/Posture: Patient sits and stands with min round yes    Today’s Treatment and Response:   Pt education was provided on exam findings, treatment diagnosis, treatment plan, expectations, and prognosis. Pt was also provided recommendations for possible soreness after evaluation.   Patient was instructed in a precautions, and treatment options and has agreed to actively participate in planning and for this course of care. Thank you for your referral. Please co-sign or sign and return this letter via fax as soon as possible to [phone].  If you have any qu

## 2020-08-27 ENCOUNTER — OFFICE VISIT (OUTPATIENT)
Dept: PHYSICAL THERAPY | Facility: HOSPITAL | Age: 75
End: 2020-08-27
Attending: INTERNAL MEDICINE
Payer: MEDICARE

## 2020-08-27 PROCEDURE — 97110 THERAPEUTIC EXERCISES: CPT

## 2020-08-27 PROCEDURE — 97112 NEUROMUSCULAR REEDUCATION: CPT

## 2020-08-27 NOTE — PROGRESS NOTES
Dx: At risk for falling              Insurance (Authorized # of Visits):  12 requested [facility])           Authorizing Physician: Dr. Jermain Garsia  Next MD visit: none scheduled  Fall Risk: Moderate        Precautions: Fall Risk            Subjective: Patient NR x 30 min  - romberg on airex EC 3 x 20 sec SBA-CGA  - romberg on level EC ball reach OH, tap down to knees x 10  - romberg level EC R/L turns x 10 R/L  - 3/4 tandem multi-direct catch x 15 R/L  - tandem walking in // bars x 2 laps; progressed to on

## 2020-09-01 ENCOUNTER — HOSPITAL (OUTPATIENT)
Dept: OTHER | Age: 75
End: 2020-09-01
Attending: INTERNAL MEDICINE

## 2020-09-01 ENCOUNTER — OFFICE VISIT (OUTPATIENT)
Dept: PHYSICAL THERAPY | Facility: HOSPITAL | Age: 75
End: 2020-09-01
Attending: INTERNAL MEDICINE
Payer: MEDICARE

## 2020-09-01 PROCEDURE — 97112 NEUROMUSCULAR REEDUCATION: CPT

## 2020-09-01 PROCEDURE — 97110 THERAPEUTIC EXERCISES: CPT

## 2020-09-01 NOTE — PROGRESS NOTES
Dx: At risk for falling              Insurance (Authorized # of Visits):  12 requested [facility])           Authorizing Physician: Dr. Everton Jurado  Next MD visit: none scheduled  Fall Risk: Moderate        Precautions: Fall Risk            Subjective: Patient BLE and lumbar stretching    - SLR, 2# x 10 R/L (fatigued)  - SL clams RTB 2 x 10 R/L  - inc depth squats at ballet bar x 10      NR x 30 min  - romberg on airex EC 3 x 20 sec SBA-CGA  - romberg on level EC ball reach OH, tap down to knees x 10  - romberg

## 2020-09-03 ENCOUNTER — OFFICE VISIT (OUTPATIENT)
Dept: PHYSICAL THERAPY | Facility: HOSPITAL | Age: 75
End: 2020-09-03
Attending: INTERNAL MEDICINE
Payer: MEDICARE

## 2020-09-03 PROCEDURE — 97112 NEUROMUSCULAR REEDUCATION: CPT

## 2020-09-03 PROCEDURE — 97110 THERAPEUTIC EXERCISES: CPT

## 2020-09-03 NOTE — PROGRESS NOTES
Dx: At risk for falling              Insurance (Authorized # of Visits):  12 requested [facility])           Authorizing Physician: Dr. Lisandra Harrison  Next MD visit: none scheduled  Fall Risk: Moderate        Precautions: Fall Risk            Subjective: Patient indicated, Shuttle next session (did not do today because progressed table exercises). Date: 8/27/2020  TX#: 2/12 Date: 8/31/2020             TX#: 3/12 Date: 9/3/2020               TX#: 4/12 Date:                 TX#: 5/ Date:    Tx#: 6/   Therex x 15 min - - -     HEP: STS no UEs, romberg stance with EC, SLS, and tandem stance    Charges:  Therex x 1, NR x 2       Total Timed Treatment: 45 min  Total Treatment Time: 45 min

## 2020-09-08 ENCOUNTER — OFFICE VISIT (OUTPATIENT)
Dept: PHYSICAL THERAPY | Facility: HOSPITAL | Age: 75
End: 2020-09-08
Attending: INTERNAL MEDICINE
Payer: MEDICARE

## 2020-09-08 PROCEDURE — 97110 THERAPEUTIC EXERCISES: CPT

## 2020-09-08 PROCEDURE — 97112 NEUROMUSCULAR REEDUCATION: CPT

## 2020-09-08 NOTE — PROGRESS NOTES
Dx: At risk for falling              Insurance (Authorized # of Visits):  12 requested [facility])           Authorizing Physician: Dr. Rey Tadeo  Next MD visit: none scheduled  Fall Risk: Moderate        Precautions: Fall Risk            Subjective: Patient in the community. 3. Patient will report and demonstrate improved ability to forward bend and squat without LOB. 4. Patient will improve glute max and glute med strength to 5/5 JESSICA to improve ability to squat and rise from squatting. - progress  5.  Min Chaudharyo R/L  - mod tandem on airex with EC 3 x 20 sec R/L back   - bosu fwd lunge with 3 sec hold, UEs to return x 10 R/L  - 3/4 tandem multi-direct catch x 15 R/L   NR x 30 min  - airex runner step ups with 2 sec hold x 10 R/L, taps as needed  - sustained lunge t

## 2020-09-10 ENCOUNTER — OFFICE VISIT (OUTPATIENT)
Dept: PHYSICAL THERAPY | Facility: HOSPITAL | Age: 75
End: 2020-09-10
Attending: INTERNAL MEDICINE
Payer: MEDICARE

## 2020-09-10 PROCEDURE — 97110 THERAPEUTIC EXERCISES: CPT

## 2020-09-10 NOTE — PROGRESS NOTES
Progress/Discharge Summary  Pt has attended 6 visits in Physical Therapy.      Dx: At risk for falling              Insurance (Authorized # of Visits):  12 requested [facility])           Authorizing Physician: Dr. Joanne Thurman  Next MD visit: none scheduled  F - Tandem Stance: >30 sec JESSICA   Fall Risk: no               - SLS: R 18 sec, L 16 sec            Fall Risk: borderline     Functional Mobility:  30 sec sit<>stand: 16x, admits LE fatigue (this is eval report, not reassessed today because not an issue) long term. Should patient note regression or return of symptoms in this time, may return for further skilled care as needed.  If do not hear from patient in this time, will assume has continued to do well and will consider this date formal D/C from PT. multi-direct catch x 15 R/L  - tandem walking in // bars x 2 laps; progressed to on airex beam x 3 laps, attempt no UEs as able   - SLS crossover cone taps (2) with back, but no tap down x 10 R/L  - romberg on airex fwd bend/squat to retrieve cones (3) fro

## 2020-09-15 ENCOUNTER — APPOINTMENT (OUTPATIENT)
Dept: PHYSICAL THERAPY | Facility: HOSPITAL | Age: 75
End: 2020-09-15
Attending: INTERNAL MEDICINE
Payer: MEDICARE

## 2020-09-17 ENCOUNTER — APPOINTMENT (OUTPATIENT)
Dept: PHYSICAL THERAPY | Facility: HOSPITAL | Age: 75
End: 2020-09-17
Attending: INTERNAL MEDICINE
Payer: MEDICARE

## 2020-09-23 ENCOUNTER — TELEPHONE (OUTPATIENT)
Dept: INTERNAL MEDICINE CLINIC | Facility: CLINIC | Age: 75
End: 2020-09-23

## 2020-09-23 ENCOUNTER — TELEMEDICINE (OUTPATIENT)
Dept: INTERNAL MEDICINE CLINIC | Facility: CLINIC | Age: 75
End: 2020-09-23

## 2020-09-23 DIAGNOSIS — R05.9 COUGH: Primary | ICD-10-CM

## 2020-09-23 DIAGNOSIS — Z20.822 EXPOSURE TO COVID-19 VIRUS: ICD-10-CM

## 2020-09-23 DIAGNOSIS — R43.0 LOSS OF SMELL: ICD-10-CM

## 2020-09-23 PROCEDURE — 99213 OFFICE O/P EST LOW 20 MIN: CPT | Performed by: NURSE PRACTITIONER

## 2020-09-23 RX ORDER — AMOXICILLIN 875 MG/1
1 TABLET, COATED ORAL 2 TIMES DAILY
Status: ON HOLD | COMMUNITY
Start: 2020-09-21 | End: 2021-01-02

## 2020-09-23 NOTE — PROGRESS NOTES
Virtual video [address] [facility] verbally consents to a [employer] visit on 09/23/20. Patient has been referred to the [facility] website at [email or web address] to review the yearly Consent to Treat document.     Patient understands an daily.  11   • Sildenafil Citrate (VIAGRA) 100 MG Oral Tab Take 1 tablet (100 mg total) by mouth daily as needed for Erectile Dysfunction. 5 tablet 2   • POTASSIUM OR Take by mouth as needed. • aspirin 81 MG Oral Tab Take 81 mg by mouth daily.      • 08/08/2020    .0 (L) 08/08/2020      Lab Results   Component Value Date    GLU 94 08/08/2020    BUN 18 08/08/2020    BUNCREA 20.5 (H) 08/08/2020    CREATSERUM 0.88 08/08/2020    ANIONGAP 1 08/08/2020    GFR 79 05/27/2016    GFRNAA 84 08/08/2020    G and not go directly to an [facility] or ED or MD office.   - Advised staying home when sick. - Recommended to practice social distancing and staying 6 ft away from other individuals.   - Educated on wearing a mask when around others if having respiratory symptom

## 2020-09-23 NOTE — PATIENT INSTRUCTIONS
Infection prevention for COVID  - proper hand hygiene is important               - cough into your arm or a tissue  - Avoid touching your mouth, nose, eyes, and face.   - Avoid contact with others if you have symptoms of an upper or lower URI  - Avoid conta

## 2020-09-24 ENCOUNTER — APPOINTMENT (OUTPATIENT)
Dept: LAB | Age: 75
End: 2020-09-24
Attending: NURSE PRACTITIONER
Payer: MEDICARE

## 2020-09-24 DIAGNOSIS — R05.9 COUGH: ICD-10-CM

## 2020-09-24 DIAGNOSIS — Z20.822 EXPOSURE TO COVID-19 VIRUS: ICD-10-CM

## 2020-09-24 DIAGNOSIS — R43.0 LOSS OF SMELL: ICD-10-CM

## 2020-09-26 LAB — SARS-COV-2 RNA RESP QL NAA+PROBE: NOT DETECTED

## 2020-10-09 LAB
ALBUMIN/GLOB SERPL: 2.2 (CALC) (ref 1–2.5)
ALBUMIN: 4.1 G/DL (ref 3.6–5.1)
ALKALINE PHOSPHATASE: 51 UNIT/L (ref 40–115)
ALT: 27 UNIT/L (ref 9–46)
AST: 20 UNIT/L (ref 10–35)
BASO%: 0.5 %
BASO%: 0.6 %
BASO%: 0.6 %
BASO: 0 10^3/UL
BILIRUBIN, TOTAL: 1.4 MG/DL (ref 0.2–1.2)
BUN/CREATININE RATIO: ABNORMAL (CALC) (ref 6–22)
CALCIUM: 9 MG/DL (ref 8.6–10.3)
CARBON DIOXIDE: 22 MMOL/L (ref 20–32)
CHLORIDE: 109 MMOL/L (ref 98–110)
CRCL (C&G) (MOSAIQ HL): 76.46 ML/MIN
CREATININE CLEARANCE (MOSAIQ HL): 52.5 ML/MIN
CREATININE: 1.06 MG/DL (ref 0.7–1.18)
EGFR AFRICAN AMERICAN: 80 ML/MIN/1.73M2
EGFR NON-AFR. AMERICAN: 69 ML/MIN/1.73M2
EOS%: 0.8 %
EOS%: 3.2 %
EOS%: 3.3 %
EOS: 0 10^3/UL
EOS: 0.1 10^3/UL
EOS: 0.1 10^3/UL
GLOBULIN: 1.9 G/DL (CALC) (ref 1.9–3.7)
GLUCOSE: 95 MG/DL (ref 65–99)
HCT: 41.3 % (ref 38–54)
HCT: 42.1 % (ref 38–54)
HCT: 42.3 % (ref 38–54)
HGB: 14.9 G/DL (ref 12–18)
HGB: 15.1 G/DL (ref 12–18)
HGB: 15.4 G/DL (ref 12–18)
LYMPH%: 31.1 % (ref 12–44)
LYMPH%: 36.9 % (ref 12–44)
LYMPH%: 38.5 % (ref 12–44)
LYMPH: 1.2 10^3/UL (ref 0.8–2.8)
LYMPH: 1.6 10^3/UL (ref 0.8–2.8)
LYMPH: 1.6 10^3/UL (ref 0.8–2.8)
MCH: 33.6 PG (ref 26–33)
MCH: 33.9 PG (ref 26–33)
MCH: 34.2 PG (ref 26–33)
MCHC: 35.7 G/DL (ref 31–36)
MCHC: 36.1 G/DL (ref 31–36)
MCHC: 36.6 G/DL (ref 31–36)
MCV: 93.6 FML (ref 82–100)
MCV: 94.1 FML (ref 82–100)
MCV: 94.2 FML (ref 82–100)
MONO%: 11 % (ref 2–12)
MONO%: 12 % (ref 2–12)
MONO%: 9.7 % (ref 2–12)
MONO: 0.4 10^3/UL (ref 0.2–1)
MONO: 0.4 10^3/UL (ref 0.2–1)
MONO: 0.6 10^3/UL (ref 0.2–1)
MPV: 10 FML (ref 8.6–11.7)
MPV: 10.7 FML (ref 8.6–11.7)
MPV: 10.9 FML (ref 8.6–11.7)
NEUT%: 47.2 % (ref 47–76)
NEUT%: 48.1 % (ref 47–76)
NEUT%: 56.5 % (ref 47–76)
NEUT: 1.6 10^3/UL (ref 1.5–7.1)
NEUT: 1.9 10^3/UL (ref 1.5–7.1)
NEUT: 2.8 10^3/UL (ref 1.5–7.1)
PLT: 110 10^3/UL (ref 150–375)
PLT: 113 10^3/UL (ref 150–375)
PLT: 138 10^3/UL (ref 150–375)
POTASSIUM: 3.8 MMOL/L (ref 3.5–5.3)
PROTEIN, TOTAL: 6 G/DL (ref 6.1–8.1)
RBC: 4.39 10^6/UL (ref 4.2–6.2)
RBC: 4.49 10^6/UL (ref 4.2–6.2)
RBC: 4.5 10^6/UL (ref 4.2–6.2)
RDW-CV: 12 %
RDW-CV: 12.2 %
RDW-CV: 12.6 %
RDW-SD: 40.4 FML (ref 36–50)
RDW-SD: 41 FML (ref 36–50)
RDW-SD: 42.5 FML (ref 36–50)
SODIUM: 140 MMOL/L (ref 135–146)
UREA NITROGEN (BUN): 22 MG/DL (ref 7–25)
WBC: 3.3 10^3/UL (ref 4.3–11)
WBC: 4 10^3/UL (ref 4.3–11)
WBC: 5 10^3/UL (ref 4.3–11)

## 2020-10-11 VITALS — WEIGHT: 192.99 LBS | SYSTOLIC BLOOD PRESSURE: 130 MMHG | DIASTOLIC BLOOD PRESSURE: 74 MMHG

## 2020-10-11 VITALS
BODY MASS INDEX: 28.44 KG/M2 | HEIGHT: 69 IN | SYSTOLIC BLOOD PRESSURE: 121 MMHG | WEIGHT: 192 LBS | DIASTOLIC BLOOD PRESSURE: 62 MMHG

## 2020-10-11 VITALS
WEIGHT: 197 LBS | DIASTOLIC BLOOD PRESSURE: 82 MMHG | BODY MASS INDEX: 29.18 KG/M2 | SYSTOLIC BLOOD PRESSURE: 134 MMHG | HEIGHT: 69 IN

## 2020-10-23 NOTE — TELEPHONE ENCOUNTER
Patient was exposed to two people who have [place]. He does not have symptoms but would like to be tested. Please text [phone].     Doximity Video Visit Consent    Adonay Aranda verbally consents to a [employer] Video Visit Check-In service on 9/23/ Plan: DISCONTINUE:  -products with dye and fragrance -hand sanitizers -dryer sheets, fabric softeners -avoid soaps such as: dial, ivory, coast, lever, [place] spring, antibacterial soap RECOMMENDED:   -Mild, tepid bathing; avoid washcloths -Body and facial Soaps/Cleansers/Moisturizers: Dove, Cetaphil, CeraVe, Aveeno, Terri, or  Eucerin -Hypoallergenic laundry detergents such as: Tide free and gentle, All free and clear Detail Level: Zone Discontinue Regimen: Lasersyn spray twice daily to affected areas \\nDesonide lotion twice daily as needed for itching Otc Regimen: Hibiclens once weekly

## 2020-12-29 DIAGNOSIS — E03.9 ACQUIRED HYPOTHYROIDISM: ICD-10-CM

## 2020-12-29 RX ORDER — LEVOTHYROXINE SODIUM 175 UG/1
175 TABLET ORAL DAILY
Qty: 90 TABLET | Refills: 1 | OUTPATIENT
Start: 2020-12-29

## 2020-12-31 ENCOUNTER — PRIOR ORIGINAL RECORDS (OUTPATIENT)
Dept: OTHER | Age: 75
End: 2020-12-31

## 2021-01-01 ENCOUNTER — APPOINTMENT (OUTPATIENT)
Dept: GENERAL RADIOLOGY | Facility: HOSPITAL | Age: 76
DRG: 303 | End: 2021-01-01
Attending: EMERGENCY MEDICINE
Payer: MEDICARE

## 2021-01-01 ENCOUNTER — HOSPITAL ENCOUNTER (INPATIENT)
Facility: HOSPITAL | Age: 76
LOS: 1 days | Discharge: HOME | DRG: 303 | End: 2021-01-02
Attending: EMERGENCY MEDICINE | Admitting: INTERNAL MEDICINE
Payer: MEDICARE

## 2021-01-01 DIAGNOSIS — R07.9 CHEST PAIN, RULE OUT ACUTE MYOCARDIAL INFARCTION: Primary | ICD-10-CM

## 2021-01-01 LAB
ALBUMIN SERPL-MCNC: 3.7 G/DL (ref 3.4–5)
ALBUMIN/GLOB SERPL: 1.2 {RATIO} (ref 1–2)
ALP LIVER SERPL-CCNC: 62 U/L
ALT SERPL-CCNC: 32 U/L
ANION GAP SERPL CALC-SCNC: 4 MMOL/L (ref 0–18)
AST SERPL-CCNC: 21 U/L (ref 15–37)
BASOPHILS # BLD AUTO: 0.02 X10(3) UL (ref 0–0.2)
BASOPHILS NFR BLD AUTO: 0.4 %
BILIRUB SERPL-MCNC: 1 MG/DL (ref 0.1–2)
BUN BLD-MCNC: 21 MG/DL (ref 7–18)
BUN/CREAT SERPL: 18.6 (ref 10–20)
CALCIUM BLD-MCNC: 8.7 MG/DL (ref 8.5–10.1)
CHLORIDE SERPL-SCNC: 111 MMOL/L (ref 98–112)
CO2 SERPL-SCNC: 25 MMOL/L (ref 21–32)
CREAT BLD-MCNC: 1.13 MG/DL
D-DIMER: 0.41 UG/ML FEU (ref ?–0.75)
DEPRECATED RDW RBC AUTO: 42 FL (ref 35.1–46.3)
EOSINOPHIL # BLD AUTO: 0.05 X10(3) UL (ref 0–0.7)
EOSINOPHIL NFR BLD AUTO: 1 %
ERYTHROCYTE [DISTWIDTH] IN BLOOD BY AUTOMATED COUNT: 12.1 % (ref 11–15)
GLOBULIN PLAS-MCNC: 3 G/DL (ref 2.8–4.4)
GLUCOSE BLD-MCNC: 95 MG/DL (ref 70–99)
HCT VFR BLD AUTO: 43.1 %
HGB BLD-MCNC: 15.5 G/DL
IMM GRANULOCYTES # BLD AUTO: 0.01 X10(3) UL (ref 0–1)
IMM GRANULOCYTES NFR BLD: 0.2 %
LYMPHOCYTES # BLD AUTO: 1.39 X10(3) UL (ref 1–4)
LYMPHOCYTES NFR BLD AUTO: 28.3 %
M PROTEIN MFR SERPL ELPH: 6.7 G/DL (ref 6.4–8.2)
MCH RBC QN AUTO: 33.7 PG (ref 26–34)
MCHC RBC AUTO-ENTMCNC: 36 G/DL (ref 31–37)
MCV RBC AUTO: 93.7 FL
MONOCYTES # BLD AUTO: 0.44 X10(3) UL (ref 0.1–1)
MONOCYTES NFR BLD AUTO: 9 %
NEUTROPHILS # BLD AUTO: 3 X10 (3) UL (ref 1.5–7.7)
NEUTROPHILS # BLD AUTO: 3 X10(3) UL (ref 1.5–7.7)
NEUTROPHILS NFR BLD AUTO: 61.1 %
OSMOLALITY SERPL CALC.SUM OF ELEC: 293 MOSM/KG (ref 275–295)
PLATELET # BLD AUTO: 127 10(3)UL (ref 150–450)
POTASSIUM SERPL-SCNC: 4 MMOL/L (ref 3.5–5.1)
RBC # BLD AUTO: 4.6 X10(6)UL
SARS-COV-2 RNA RESP QL NAA+PROBE: NOT DETECTED
SODIUM SERPL-SCNC: 140 MMOL/L (ref 136–145)
TROPONIN I SERPL-MCNC: <0.045 NG/ML (ref ?–0.04)
TROPONIN I SERPL-MCNC: <0.045 NG/ML (ref ?–0.04)
TSI SER-ACNC: 0.41 MIU/ML (ref 0.36–3.74)
WBC # BLD AUTO: 4.9 X10(3) UL (ref 4–11)

## 2021-01-01 PROCEDURE — 99223 1ST HOSP IP/OBS HIGH 75: CPT | Performed by: INTERNAL MEDICINE

## 2021-01-01 PROCEDURE — 71045 X-RAY EXAM CHEST 1 VIEW: CPT | Performed by: EMERGENCY MEDICINE

## 2021-01-01 RX ORDER — EZETIMIBE 10 MG/1
10 TABLET ORAL NIGHTLY
Status: DISCONTINUED | OUTPATIENT
Start: 2021-01-01 | End: 2021-01-02

## 2021-01-01 RX ORDER — EZETIMIBE 10 MG/1
10 TABLET ORAL DAILY
Status: DISCONTINUED | OUTPATIENT
Start: 2021-01-02 | End: 2021-01-01

## 2021-01-01 RX ORDER — FINASTERIDE 5 MG/1
5 TABLET, FILM COATED ORAL DAILY
Status: DISCONTINUED | OUTPATIENT
Start: 2021-01-02 | End: 2021-01-01

## 2021-01-01 RX ORDER — ASPIRIN 81 MG/1
324 TABLET, CHEWABLE ORAL ONCE
Status: COMPLETED | OUTPATIENT
Start: 2021-01-01 | End: 2021-01-01

## 2021-01-01 RX ORDER — ATORVASTATIN CALCIUM 80 MG/1
80 TABLET, FILM COATED ORAL NIGHTLY
Status: DISCONTINUED | OUTPATIENT
Start: 2021-01-01 | End: 2021-01-02

## 2021-01-01 RX ORDER — LOSARTAN POTASSIUM 25 MG/1
25 TABLET ORAL NIGHTLY
Status: DISCONTINUED | OUTPATIENT
Start: 2021-01-01 | End: 2021-01-02

## 2021-01-01 RX ORDER — FINASTERIDE 5 MG/1
5 TABLET, FILM COATED ORAL NIGHTLY
Status: DISCONTINUED | OUTPATIENT
Start: 2021-01-01 | End: 2021-01-02

## 2021-01-01 RX ORDER — MELATONIN
3 NIGHTLY PRN
Status: DISCONTINUED | OUTPATIENT
Start: 2021-01-01 | End: 2021-01-02

## 2021-01-01 RX ORDER — ENOXAPARIN SODIUM 100 MG/ML
40 INJECTION SUBCUTANEOUS NIGHTLY
Status: DISCONTINUED | OUTPATIENT
Start: 2021-01-01 | End: 2021-01-02

## 2021-01-01 RX ORDER — METOCLOPRAMIDE HYDROCHLORIDE 5 MG/ML
10 INJECTION INTRAMUSCULAR; INTRAVENOUS EVERY 8 HOURS PRN
Status: DISCONTINUED | OUTPATIENT
Start: 2021-01-01 | End: 2021-01-02

## 2021-01-01 RX ORDER — PANTOPRAZOLE SODIUM 40 MG/1
40 TABLET, DELAYED RELEASE ORAL
Status: DISCONTINUED | OUTPATIENT
Start: 2021-01-02 | End: 2021-01-02

## 2021-01-01 RX ORDER — ONDANSETRON 2 MG/ML
4 INJECTION INTRAMUSCULAR; INTRAVENOUS EVERY 4 HOURS PRN
Status: DISCONTINUED | OUTPATIENT
Start: 2021-01-01 | End: 2021-01-01

## 2021-01-01 RX ORDER — NITROGLYCERIN 0.4 MG/1
0.4 TABLET SUBLINGUAL EVERY 5 MIN PRN
Status: DISCONTINUED | OUTPATIENT
Start: 2021-01-01 | End: 2021-01-02

## 2021-01-01 RX ORDER — TAMSULOSIN HYDROCHLORIDE 0.4 MG/1
0.8 CAPSULE ORAL DAILY
Status: DISCONTINUED | OUTPATIENT
Start: 2021-01-02 | End: 2021-01-02

## 2021-01-01 RX ORDER — ASPIRIN 81 MG/1
81 TABLET ORAL DAILY
Status: DISCONTINUED | OUTPATIENT
Start: 2021-01-02 | End: 2021-01-02

## 2021-01-01 RX ORDER — ONDANSETRON 2 MG/ML
4 INJECTION INTRAMUSCULAR; INTRAVENOUS EVERY 6 HOURS PRN
Status: DISCONTINUED | OUTPATIENT
Start: 2021-01-01 | End: 2021-01-02

## 2021-01-01 RX ORDER — ACETAMINOPHEN 325 MG/1
650 TABLET ORAL EVERY 6 HOURS PRN
Status: DISCONTINUED | OUTPATIENT
Start: 2021-01-01 | End: 2021-01-02

## 2021-01-01 NOTE — ED PROVIDER NOTES
Patient Seen in: [facility] Emergency Department      History   Patient presents with:  Chest Pain Angina  Back Pain    Stated Complaint: back pain, chest pain, arm pain    HPI/Subjective:   HPI    68-year-old with past medical history of coronary a N/A 11/14/2017    Performed by Edmundo Allison MD at [address] [facility] 8/4/2017    Performed by Edmundo Allison MD at [address] [facility] [address]   • OTHER  2009    prostate surgey   • OTHER  03/16/2016    left r rhythm. Pulmonary:      Effort: Pulmonary effort is normal.      Breath sounds: Normal breath sounds. Abdominal:      Palpations: Abdomen is soft. Tenderness: There is no abdominal tenderness. Musculoskeletal: Normal range of motion.    Skin: 10/31/2019, 2:38 PM.  INDICATIONS:  back pain, chest pain, arm pain  PATIENT STATED HISTORY: (As transcribed by Technologist)  Patient states he is having upper right back pain radiating to left shouder  and chest for 2 hours.     FINDINGS:  The cardiac sha in regards to admission.     Admission disposition: 1/1/2021  6:16 PM                            Disposition and Plan     Clinical Impression:  Chest pain, rule out acute myocardial infarction  (primary encounter diagnosis)    Disposition:  Admit  1/1/2021

## 2021-01-01 NOTE — ED INITIAL ASSESSMENT (HPI)
Upper right back pain  Radiating to left shouder  And chest  Since 2 hours . Pain is on and  Off  And stabbing in nature. Known cardiac patient on medications .

## 2021-01-02 ENCOUNTER — APPOINTMENT (OUTPATIENT)
Dept: CV DIAGNOSTICS | Facility: HOSPITAL | Age: 76
DRG: 303 | End: 2021-01-02
Attending: INTERNAL MEDICINE
Payer: MEDICARE

## 2021-01-02 VITALS
HEART RATE: 89 BPM | TEMPERATURE: 98 F | SYSTOLIC BLOOD PRESSURE: 140 MMHG | OXYGEN SATURATION: 98 % | DIASTOLIC BLOOD PRESSURE: 73 MMHG | RESPIRATION RATE: 18 BRPM | HEIGHT: 69 IN | WEIGHT: 188 LBS | BODY MASS INDEX: 27.85 KG/M2

## 2021-01-02 LAB
ANION GAP SERPL CALC-SCNC: 3 MMOL/L (ref 0–18)
BASOPHILS # BLD AUTO: 0.03 X10(3) UL (ref 0–0.2)
BASOPHILS NFR BLD AUTO: 0.7 %
BUN BLD-MCNC: 17 MG/DL (ref 7–18)
BUN/CREAT SERPL: 19.1 (ref 10–20)
CALCIUM BLD-MCNC: 8.3 MG/DL (ref 8.5–10.1)
CHLORIDE SERPL-SCNC: 112 MMOL/L (ref 98–112)
CHOLEST SMN-MCNC: 103 MG/DL (ref ?–200)
CO2 SERPL-SCNC: 27 MMOL/L (ref 21–32)
CREAT BLD-MCNC: 0.89 MG/DL
DEPRECATED RDW RBC AUTO: 42 FL (ref 35.1–46.3)
EOSINOPHIL # BLD AUTO: 0.08 X10(3) UL (ref 0–0.7)
EOSINOPHIL NFR BLD AUTO: 1.9 %
ERYTHROCYTE [DISTWIDTH] IN BLOOD BY AUTOMATED COUNT: 12.1 % (ref 11–15)
GLUCOSE BLD-MCNC: 93 MG/DL (ref 70–99)
HCT VFR BLD AUTO: 40.1 %
HDLC SERPL-MCNC: 60 MG/DL (ref 40–59)
HGB BLD-MCNC: 14.2 G/DL
IMM GRANULOCYTES # BLD AUTO: 0 X10(3) UL (ref 0–1)
IMM GRANULOCYTES NFR BLD: 0 %
LDLC SERPL CALC-MCNC: 27 MG/DL (ref ?–100)
LYMPHOCYTES # BLD AUTO: 1.67 X10(3) UL (ref 1–4)
LYMPHOCYTES NFR BLD AUTO: 39 %
MCH RBC QN AUTO: 33.5 PG (ref 26–34)
MCHC RBC AUTO-ENTMCNC: 35.4 G/DL (ref 31–37)
MCV RBC AUTO: 94.6 FL
MONOCYTES # BLD AUTO: 0.44 X10(3) UL (ref 0.1–1)
MONOCYTES NFR BLD AUTO: 10.3 %
NEUTROPHILS # BLD AUTO: 2.06 X10 (3) UL (ref 1.5–7.7)
NEUTROPHILS # BLD AUTO: 2.06 X10(3) UL (ref 1.5–7.7)
NEUTROPHILS NFR BLD AUTO: 48.1 %
NONHDLC SERPL-MCNC: 43 MG/DL (ref ?–130)
OSMOLALITY SERPL CALC.SUM OF ELEC: 295 MOSM/KG (ref 275–295)
PLATELET # BLD AUTO: 110 10(3)UL (ref 150–450)
POTASSIUM SERPL-SCNC: 3.8 MMOL/L (ref 3.5–5.1)
RBC # BLD AUTO: 4.24 X10(6)UL
SODIUM SERPL-SCNC: 142 MMOL/L (ref 136–145)
TRIGL SERPL-MCNC: 80 MG/DL (ref 30–149)
TROPONIN I SERPL-MCNC: <0.045 NG/ML (ref ?–0.04)
VLDLC SERPL CALC-MCNC: 16 MG/DL (ref 0–30)
WBC # BLD AUTO: 4.3 X10(3) UL (ref 4–11)

## 2021-01-02 PROCEDURE — 99239 HOSP IP/OBS DSCHRG MGMT >30: CPT | Performed by: HOSPITALIST

## 2021-01-02 PROCEDURE — 93306 TTE W/DOPPLER COMPLETE: CPT | Performed by: INTERNAL MEDICINE

## 2021-01-02 PROCEDURE — 78452 HT MUSCLE IMAGE SPECT MULT: CPT | Performed by: INTERNAL MEDICINE

## 2021-01-02 PROCEDURE — 93017 CV STRESS TEST TRACING ONLY: CPT | Performed by: INTERNAL MEDICINE

## 2021-01-02 PROCEDURE — 93018 CV STRESS TEST I&R ONLY: CPT | Performed by: INTERNAL MEDICINE

## 2021-01-02 RX ORDER — METOPROLOL SUCCINATE 25 MG/1
12.5 TABLET, EXTENDED RELEASE ORAL
Refills: 0 | Status: SHIPPED | COMMUNITY
Start: 2021-01-03 | End: 2021-02-25

## 2021-01-02 RX ORDER — POTASSIUM CHLORIDE 20 MEQ/1
40 TABLET, EXTENDED RELEASE ORAL ONCE
Status: COMPLETED | OUTPATIENT
Start: 2021-01-02 | End: 2021-01-02

## 2021-01-02 NOTE — CONSULTS
[facility] Cardiology Consultation    Annette Mendieta Patient Status:  Inpatient    1945 MRN [id number]   [facility] 2NE-A Attending Mendy Prajapati MD   Hosp Day # 1 PCP Cecilia Morales MD     Reason for Consultation:  Chest pain, pr Visual impairment     glasses     Past Surgical History:   Procedure Laterality Date   • ANGIOPLASTY (CORONARY)  3/2015    stents x 2   • CABG  2005   • COLONOSCOPY  2019   • LUMBAR EPIDURAL N/A 12/5/2017    Performed by Rachell Jean Baptiste MD at [facility] 125/62    Last 3 Weights  01/01/21 2053 : 188 lb (85.3 kg)  01/01/21 1600 : 185 lb (83.9 kg)  08/10/20 1344 : 185 lb (83.9 kg)  08/03/20 1308 : 187 lb 3.2 oz (84.9 kg)          General: No apparent distress  HEENT: No focal deficits. Neck: supple.  JVP nor edy/gregory Newman  1/2/2021  8:48 AM

## 2021-01-02 NOTE — PROGRESS NOTES
[facility]PITAL[facility]  Progress Note     Matty Israel Patient Status:  Inpatient    1945 MRN [id number]   [facility] 2NE-A Attending Chel Wadsworth MD   Hosp Day # 1 PCP Noreen Jesus MD     Chief Complaint: Right scapular p Results   Component Value Date    COVID19 Not Detected 01/01/2021    COVID19 Not Detected 09/24/2020       Pro-Calcitonin  No results for input(s): PCT in the last 168 hours.     Cardiac  Recent Labs   Lab 01/01/21  1559 01/01/21  1841 01/02/21  0604   TROP documentation in H+P. Based on patients current state of illness, I anticipate that, after discharge, patient will require TBD.

## 2021-01-02 NOTE — PLAN OF CARE
Received pt this AM a&o x4. Ambulates with steady gait. Currently chest pain free with three negative troponins. Will receive an echo and stress test today. Seen by cardiology.     Problem: CARDIOVASCULAR - ADULT  Goal: Maintains optimal cardiac output and

## 2021-01-02 NOTE — PROGRESS NOTES
Clear for discharge per cardiology and hospitalist. Tele and IV removed without complication. All discharge instructions, including meds and follow-up appointments, were reviewed with patient and spouse and verbalized an understanding.  All questions answer

## 2021-01-02 NOTE — DISCHARGE SUMMARY
[facility] HOSPITALIST  DISCHARGE SUMMARY     Sherman Hatchet Patient Status:  Inpatient    1945 MRN [id number]   [facility] 2NE-A Attending Luciana Epley, MD   [facility] Day # 1 PCP Radha Mejia MD     Date of Admission: 2021 from the hospital.    Discharge Medication List:     Discharge Medications      CHANGE how you take these medications      Instructions Prescription details   atorvastatin 80 MG Tabs  Commonly known as: LIPITOR  What changed:   · how to take this  · when t medications    amoxicillin 875 MG Tabs  Commonly known as: AMOXIL               [facility] NA    Follow-up appointment:   No follow-up provider specified.   Appointments for Next 30 Days 1/2/2021 - 2/1/2021    None          ----------------------------

## 2021-01-02 NOTE — H&P
[facility]  History and Physical     Danielle [facility] Patient Status:  Emergency    1945 MRN [id number]   Location [address] [facility] Attending Stevo Wynn [facility] Day # 0 PCP Raphael Kirkland MD     Ch glasses        Past Surgical History:   Past Surgical History:   Procedure Laterality Date   • ANGIOPLASTY (CORONARY)  3/2015    stents x 2   • CABG  2005   • COLONOSCOPY  2019   • LUMBAR EPIDURAL N/A 12/5/2017    Performed by Yasmine Nuñez MD at  90 tablet, Rfl: 1    •  finasteride 5 MG Oral Tab, Take 1 tablet (5 mg total) by mouth daily. , Disp: 90 tablet, Rfl: 3    •  Pantoprazole Sodium 40 MG Oral Tab EC, Take 1 tablet (40 mg total) by mouth daily as needed. , Disp: , Rfl: 0    •  METOPROLOL SUCCI    K 4.0      CO2 25.0   ALKPHO 62   AST 21   ALT 32   BILT 1.0   TP 6.7       Estimated Creatinine Clearance: 56.5 mL/min (based on SCr of 1.13 mg/dL). No results for input(s): PTP, INR in the last 168 hours.     Recent Labs   Lab 01/01/21

## 2021-01-03 LAB
ATRIAL RATE: 49 BPM
P AXIS: 34 DEGREES
P-R INTERVAL: 194 MS
Q-T INTERVAL: 422 MS
QRS DURATION: 120 MS
QTC CALCULATION (BEZET): 381 MS
R AXIS: -34 DEGREES
T AXIS: 17 DEGREES
VENTRICULAR RATE: 49 BPM

## 2021-01-04 NOTE — PAYOR COMM NOTE
--------------  ADMISSION REVIEW     Payor: [place] #:  [id number]  Authorization Number:  [id number]       ED Provider Notes             Patient Seen in: [facility] Emergency Department      History   Patient presents wi 2   • CABG  2005   • COLONOSCOPY  2019   • LUMBAR EPIDURAL N/A 12/5/2017    Performed by Checo Villeda MD at [address] [facility] 11/14/2017    Performed by Checo Villeda MD at [address] [place] [address] warm.   Neurological:      General: No focal deficit present. Mental Status: He is alert. Cranial Nerves: No cranial nerve deficit. Sensory: No sensory deficit. Motor: No weakness.       Coordination: Coordination normal.   Psychiatric: evidence for acute CHF. The lungs are clear. The costophrenic angles are sharp. There is no active disease seen. CONCLUSION:  The cardiac shadow is enlarged. However, there is no sign of CHF, pneumonia, effusion, edema, or other acute process. Illness: Anival Collins is a 76year old male with past medical history significant coronary artery disease and CABG presented to the emergency department with chief complaints of chest and back pain.     Patient symptoms began approximately 2 hours befor affect.       Diagnostic Data:      Labs:  Recent Labs   Lab 01/01/21  1559   WBC 4.9   HGB 15.5   MCV 93.7   .0*       Recent Labs   Lab 01/01/21  1559   GLU 95   BUN 21*   CREATSERUM 1.13   GFRAA 73   GFRNAA 63   CA 8.7   ALB 3.7      K 4.0 d/c.  Would decrease Toprol to 12.5 mg daily on d/c.            1/2 DC SUMMARY    Date of Admission: 1/1/2021  Date of Discharge: 1/2/2021  Discharge Disposition: Home or Self Care     Discharge Diagnosis:   1. Chest pain  2. CAD sp CABG sp PCI  3.  Erickson Mendoza Instructions Prescription details   atorvastatin 80 MG Tabs  Commonly known as: LIPITOR  What changed:   · how to take this  · when to take this       TAKE 1 TABLET BY MOUTH ONCE DAILY    Quantity: 90 tablet  Refills: 2      ezetimibe 10 MG Tabs  Commonly       [facility] reviewed: NA     Follow-up appointment:   No follow-up provider specified.       Appointments for Next 30 Days 1/2/2021 - 2/1/2021     None             -------------------------------------------------------------------------------------------

## 2021-01-05 ENCOUNTER — PATIENT OUTREACH (OUTPATIENT)
Dept: CASE MANAGEMENT | Age: 76
End: 2021-01-05

## 2021-01-05 DIAGNOSIS — D69.6 THROMBOCYTOPENIA (HCC): ICD-10-CM

## 2021-01-05 DIAGNOSIS — Z02.9 ENCOUNTERS FOR UNSPECIFIED ADMINISTRATIVE PURPOSE: ICD-10-CM

## 2021-01-05 DIAGNOSIS — R07.9 CHEST PAIN, RULE OUT ACUTE MYOCARDIAL INFARCTION: ICD-10-CM

## 2021-01-05 PROCEDURE — 1111F DSCHRG MED/CURRENT MED MERGE: CPT

## 2021-01-05 NOTE — PROGRESS NOTES
Initial Post Discharge Follow Up   Discharge Date: 1/2/21  Contact Date: 1/5/2021    Consent Verification:  Assessment Completed With: Patient  HIPAA Verified? Yes    Discharge Dx:     1. Chest pain  2. CAD sp CABG sp PCI  3. Essential hypertension  4. 1 TABLET BY MOUTH ONCE DAILY (Patient taking differently: 10 mg nightly.  ) 90 tablet 2   • tamsulosin (FLOMAX) cap Take 2 capsules (0.8 mg total) by mouth once daily.  180 capsule 2   • LOSARTAN POTASSIUM 25 MG Oral Tab TAKE 1 TABLET BY MOUTH  NIGHTLY 90 t Feb 15, 2021  9:15 AM CST Follow Up Visit with Silvestre Leal MD Urology - [address] [facility] - [address] [facility])    For the safety of our patients, visitors and care team, we are asking patients not to bring anyone with them to the

## 2021-01-14 ENCOUNTER — OFFICE VISIT (OUTPATIENT)
Dept: INTERNAL MEDICINE CLINIC | Facility: CLINIC | Age: 76
End: 2021-01-14
Payer: COMMERCIAL

## 2021-01-14 VITALS
BODY MASS INDEX: 28.48 KG/M2 | RESPIRATION RATE: 14 BRPM | HEIGHT: 69 IN | SYSTOLIC BLOOD PRESSURE: 122 MMHG | WEIGHT: 192.31 LBS | OXYGEN SATURATION: 96 % | HEART RATE: 49 BPM | TEMPERATURE: 98 F | DIASTOLIC BLOOD PRESSURE: 80 MMHG

## 2021-01-14 DIAGNOSIS — Z09 HOSPITAL DISCHARGE FOLLOW-UP: Primary | ICD-10-CM

## 2021-01-14 DIAGNOSIS — E78.00 PURE HYPERCHOLESTEROLEMIA: ICD-10-CM

## 2021-01-14 DIAGNOSIS — I25.10 CORONARY ARTERY DISEASE INVOLVING NATIVE CORONARY ARTERY OF NATIVE HEART WITHOUT ANGINA PECTORIS: ICD-10-CM

## 2021-01-14 DIAGNOSIS — Z13.5 GLAUCOMA SCREENING: ICD-10-CM

## 2021-01-14 PROCEDURE — 3008F BODY MASS INDEX DOCD: CPT | Performed by: INTERNAL MEDICINE

## 2021-01-14 PROCEDURE — 3074F SYST BP LT 130 MM HG: CPT | Performed by: INTERNAL MEDICINE

## 2021-01-14 PROCEDURE — 99495 TRANSJ CARE MGMT MOD F2F 14D: CPT | Performed by: INTERNAL MEDICINE

## 2021-01-14 PROCEDURE — 1111F DSCHRG MED/CURRENT MED MERGE: CPT | Performed by: INTERNAL MEDICINE

## 2021-01-14 PROCEDURE — 3079F DIAST BP 80-89 MM HG: CPT | Performed by: INTERNAL MEDICINE

## 2021-01-14 NOTE — PROGRESS NOTES
HPI:    Svetlana Major is a 76year old male here today for hospital follow up.    He was discharged from Inpatient hospital, [facility] to Home     Date of Admission: 1/1/2021  Date of Discharge: 1/2/2021  Discharge Disposition: Home or Self Care documents  ? Reviewed need for follow-up on pending tests, need for follow-up on diagnostic tests and need for follow-up on treatments  ? specialists already aware of assumption/resumption of care  ?  Education given to patient on self-management, independe Sodium 40 MG Oral Tab EC, Take 1 tablet (40 mg total) by mouth daily as needed. •  Sildenafil Citrate (VIAGRA) 100 MG Oral Tab, Take 1 tablet (100 mg total) by mouth daily as needed for Erectile Dysfunction. •  POTASSIUM OR, Take by mouth as needed. denies heartburn, denies diarrhea  MUSCULOSKELETAL: denies pain, normal range of motion of extremities  NEURO: denies headaches, denies dizziness, denies weakness  PSYCHE: denies depression or anxiety  HEMATOLOGIC: denies hx of anemia, or bruising, denies Certification:  I certify that the following are true:  Communication with the patient was made within 2 business days of discharge on date above   Medical Decision Making- Based on service period of discharge to 30 days:   · Number of Possible Diagnoses a

## 2021-02-28 DIAGNOSIS — E03.9 ACQUIRED HYPOTHYROIDISM: ICD-10-CM

## 2021-03-01 RX ORDER — LEVOTHYROXINE SODIUM 175 UG/1
TABLET ORAL
Qty: 90 TABLET | Refills: 1 | Status: SHIPPED | OUTPATIENT
Start: 2021-03-01 | End: 2021-08-26

## 2021-03-05 DIAGNOSIS — Z23 NEED FOR VACCINATION: ICD-10-CM

## 2021-07-12 ENCOUNTER — TELEPHONE (OUTPATIENT)
Dept: INTERNAL MEDICINE CLINIC | Facility: CLINIC | Age: 76
End: 2021-07-12

## 2021-07-12 DIAGNOSIS — L57.0 ACTINIC KERATOSES: Primary | ICD-10-CM

## 2021-07-12 NOTE — TELEPHONE ENCOUNTER
Functional Status Done?:  No- dermatology requested referral                       Provider's Name?:  Reema Garcia  Provider's Specialty?:  Dermatology  Reason for Visit?:  Follow up   Diagnosis?:    Number of Visits Requested?:  1  Last Visit with [facility]

## 2021-07-13 NOTE — TELEPHONE ENCOUNTER
Fax also received from office. Referral approved. Faxed referral to Kathleen Oglesby at Dr [facility] office. Contacted pt.

## 2021-07-13 NOTE — TELEPHONE ENCOUNTER
Is pt up to date on OVs with PCP?: yes  Has pt been seen/referred for condition?: yes  Is specialist in network?: yes  Health Maintenance up to date?: yes    Referral order placed  Message sent to [address]    To notify pt when approved

## 2021-07-21 ENCOUNTER — MED REC SCAN ONLY (OUTPATIENT)
Dept: INTERNAL MEDICINE CLINIC | Facility: CLINIC | Age: 76
End: 2021-07-21

## 2021-07-28 ENCOUNTER — PATIENT MESSAGE (OUTPATIENT)
Dept: INTERNAL MEDICINE CLINIC | Facility: CLINIC | Age: 76
End: 2021-07-28

## 2021-07-28 DIAGNOSIS — Z13.5 GLAUCOMA SCREENING: Primary | ICD-10-CM

## 2021-07-30 NOTE — TELEPHONE ENCOUNTER
From: Naya Lopez  To: Tennille Leiva MD  Sent: 7/28/2021 6:28 PM CDT  Subject: Other    Suggest an eye doctor for exam and glaucoma, I havent had a comprehensive exam in several years.  Your attention to his matter wouldbe greatly appreci

## 2021-07-30 NOTE — TELEPHONE ENCOUNTER
Is pt up to date on OVs with PCP?: yes  Has pt been seen/referred for condition?: yes  Is specialist in network?: yes  Health Maintenance up to date?: yes         If no, reminder given?: n/a        Message sent to [place] in referrals dept to confirm Kareem Junior

## 2021-08-11 ENCOUNTER — MED REC SCAN ONLY (OUTPATIENT)
Dept: INTERNAL MEDICINE CLINIC | Facility: CLINIC | Age: 76
End: 2021-08-11

## 2021-08-12 ENCOUNTER — OFFICE VISIT (OUTPATIENT)
Dept: INTERNAL MEDICINE CLINIC | Facility: CLINIC | Age: 76
End: 2021-08-12
Payer: COMMERCIAL

## 2021-08-12 ENCOUNTER — TELEPHONE (OUTPATIENT)
Dept: INTERNAL MEDICINE CLINIC | Facility: CLINIC | Age: 76
End: 2021-08-12

## 2021-08-12 VITALS
BODY MASS INDEX: 28.54 KG/M2 | SYSTOLIC BLOOD PRESSURE: 122 MMHG | TEMPERATURE: 98 F | HEART RATE: 53 BPM | DIASTOLIC BLOOD PRESSURE: 70 MMHG | HEIGHT: 67.13 IN | RESPIRATION RATE: 16 BRPM | WEIGHT: 184 LBS | OXYGEN SATURATION: 97 %

## 2021-08-12 DIAGNOSIS — Z00.00 ENCOUNTER FOR ANNUAL HEALTH EXAMINATION: Primary | ICD-10-CM

## 2021-08-12 DIAGNOSIS — E78.00 PURE HYPERCHOLESTEROLEMIA: ICD-10-CM

## 2021-08-12 DIAGNOSIS — H91.8X3 OTHER SPECIFIED HEARING LOSS OF BOTH EARS: Primary | ICD-10-CM

## 2021-08-12 DIAGNOSIS — E03.9 ACQUIRED HYPOTHYROIDISM: ICD-10-CM

## 2021-08-12 DIAGNOSIS — R39.9 LOWER URINARY TRACT SYMPTOMS (LUTS): ICD-10-CM

## 2021-08-12 DIAGNOSIS — I25.10 CORONARY ARTERY DISEASE INVOLVING NATIVE CORONARY ARTERY OF NATIVE HEART WITHOUT ANGINA PECTORIS: ICD-10-CM

## 2021-08-12 DIAGNOSIS — D69.6 THROMBOCYTOPENIA (HCC): ICD-10-CM

## 2021-08-12 DIAGNOSIS — M47.817 FACET ARTHRITIS OF LUMBOSACRAL REGION: ICD-10-CM

## 2021-08-12 DIAGNOSIS — R61 NIGHT SWEATS: ICD-10-CM

## 2021-08-12 PROBLEM — I70.0 ATHEROSCLEROSIS OF AORTA (HCC): Status: RESOLVED | Noted: 2018-05-21 | Resolved: 2021-08-12

## 2021-08-12 PROBLEM — I70.0 ATHEROSCLEROSIS OF AORTA: Status: RESOLVED | Noted: 2018-05-21 | Resolved: 2021-08-12

## 2021-08-12 PROBLEM — R05.3 CHRONIC COUGH: Status: RESOLVED | Noted: 2018-02-08 | Resolved: 2021-08-12

## 2021-08-12 PROBLEM — R07.9 CHEST PAIN, RULE OUT ACUTE MYOCARDIAL INFARCTION: Status: RESOLVED | Noted: 2021-01-01 | Resolved: 2021-08-12

## 2021-08-12 PROCEDURE — G0439 PPPS, SUBSEQ VISIT: HCPCS | Performed by: INTERNAL MEDICINE

## 2021-08-12 PROCEDURE — 99397 PER PM REEVAL EST PAT 65+ YR: CPT | Performed by: INTERNAL MEDICINE

## 2021-08-12 PROCEDURE — 3008F BODY MASS INDEX DOCD: CPT | Performed by: INTERNAL MEDICINE

## 2021-08-12 PROCEDURE — 3074F SYST BP LT 130 MM HG: CPT | Performed by: INTERNAL MEDICINE

## 2021-08-12 PROCEDURE — 3078F DIAST BP <80 MM HG: CPT | Performed by: INTERNAL MEDICINE

## 2021-08-12 PROCEDURE — 96160 PT-FOCUSED HLTH RISK ASSMT: CPT | Performed by: INTERNAL MEDICINE

## 2021-08-12 RX ORDER — BETAMETHASONE DIPROPIONATE 0.5 MG/G
LOTION TOPICAL
COMMUNITY
Start: 2021-07-27

## 2021-08-12 NOTE — TELEPHONE ENCOUNTER
Noted, thanks  Message sent to [address] [place] [address] if needed approval    [place] [address]  [phone]

## 2021-08-12 NOTE — TELEPHONE ENCOUNTER
Dr. Luigi Theodore please see note below  Pt is asking for Audiologist/hearing test recommendation  Please advise, thanks

## 2021-08-12 NOTE — PATIENT INSTRUCTIONS
Eduarda Dejesus's SCREENING SCHEDULE   Tests on this list are recommended by your physician but may not be covered, or covered at this frequency, by your insurer. Please check with your insurance carrier before scheduling to verify coverage.    NC 10/30/2020  No recommendations at this time    Pneumococcal Each vaccine (Agnojrk42 & Pdswduyfx92) covered once after 65 Prevnar 13: 05/12/2016    Spduwummj62: 10/05/2011     No recommendations at this time    Hepatitis B One screening covered for patients

## 2021-08-12 NOTE — PROGRESS NOTES
HPI:   Alexi Quesada is a 68year old male who presents for a MA (Medicare Advantage) [facility] (Once per calendar year). He is , execises 3 days weekly, 2 adult children , 1 in [place] and 1 in [place].    Drinks 2 glasses wine daily, tr Advance care planning including the explanation and discussion of advance directives standard forms performed Face to Face with patient and Family/surrogate (if present), and forms available to patient in AVS         He smoked tobacco in the past but Joe Boeck 103 01/02/2021    HDL 60 (H) 01/02/2021    LDL 27 01/02/2021    TRIG 80 01/02/2021          Last Chemistry Labs:   Lab Results   Component Value Date    AST 21 01/01/2021    ALT 32 01/01/2021    CA 8.3 (L) 01/02/2021    ALB 3.7 01/01/2021    TSH 0.413 01/0 (5/22/2018), Multiple benign nevi (49/37/8098), Other follicular cysts of the skin and subcutaneous tissue (10/29/2019), Thrombocytopenia ([facility] [address] (5/21/2018), Thyroid disease, and Visual impairment.     He  has a past surgical history that includes angioplast from the following:    Height as of this encounter: 5' 7.13\" (1.705 m). Weight as of this encounter: 184 lb (83.5 kg).     Medicare Hearing Assessment  (Required for AWV/SWV)    Finger Rub christy b/l           General Appearance:  Alert, cooperative, no angina pectoris  Plan: per cards, RF control      (I70.0) Atherosclerosis of aorta ([address]  Plan: RF control    (D69.6) Thrombocytopenia (HCC)  Plan: CBC WITH DIFFERENTIAL WITH PLATELET, OP         REFERRAL TO Constantino Spann (mg/dL)   Date Value   01/02/2021 27     Calculated LDL (mg/dL)   Date Value   12/15/2017 41        EKG - w/ Initial Preventative Physical Exam only, or if medically necessary Electrocardiogram date06/16/2016    Colorectal Cancer Screening      Colonoscopy Procedure        Annual Monitoring of Persistent Medications (ACE/ARB, digoxin diuretics, anticonvulsants)    Potassium Annually Lab Results   Component Value Date    K 3.8 01/02/2021         Creatinine   Annually Lab Results   Component Value Date    CREA

## 2021-08-12 NOTE — TELEPHONE ENCOUNTER
Patient saw Dr Launa Cruz today for a supervisit. He would like a hearing test.  Would Dr Luana Cruz order that for him or give him a referral to an audiologist?  Please advise. Thank you!

## 2021-08-26 DIAGNOSIS — E03.9 ACQUIRED HYPOTHYROIDISM: ICD-10-CM

## 2021-08-26 RX ORDER — LEVOTHYROXINE SODIUM 175 UG/1
TABLET ORAL
Qty: 90 TABLET | Refills: 3 | Status: SHIPPED | OUTPATIENT
Start: 2021-08-26

## 2021-10-14 DIAGNOSIS — E03.9 ACQUIRED HYPOTHYROIDISM: ICD-10-CM

## 2021-10-14 RX ORDER — LEVOTHYROXINE SODIUM 175 UG/1
175 TABLET ORAL DAILY
Qty: 90 TABLET | Refills: 3 | OUTPATIENT
Start: 2021-10-14

## 2021-11-24 ENCOUNTER — HOSPITAL ENCOUNTER (OUTPATIENT)
Age: 76
Discharge: HOME | End: 2021-11-24
Payer: MEDICARE

## 2021-11-24 VITALS
DIASTOLIC BLOOD PRESSURE: 73 MMHG | HEART RATE: 50 BPM | WEIGHT: 185 LBS | SYSTOLIC BLOOD PRESSURE: 124 MMHG | TEMPERATURE: 98 F | BODY MASS INDEX: 27.4 KG/M2 | HEIGHT: 69 IN | OXYGEN SATURATION: 96 % | RESPIRATION RATE: 16 BRPM

## 2021-11-24 DIAGNOSIS — Z20.822 LAB TEST NEGATIVE FOR COVID-19 VIRUS: ICD-10-CM

## 2021-11-24 DIAGNOSIS — Z11.52 ENCOUNTER FOR SCREENING FOR COVID-19: Primary | ICD-10-CM

## 2021-11-24 PROCEDURE — 99212 OFFICE O/P EST SF 10 MIN: CPT | Performed by: PHYSICIAN ASSISTANT

## 2021-11-24 PROCEDURE — U0002 COVID-19 LAB TEST NON-CDC: HCPCS | Performed by: PHYSICIAN ASSISTANT

## 2021-11-24 NOTE — ED PROVIDER NOTES
Patient Seen in: [address]      History   Patient presents with:  Covid-19 Test    Stated Complaint: Covid-19 Test    Subjective:   HPI    45-year-old male who is fully vaccinated with booster last week comes in today for COVID-19 t noted in HPI. Constitutional and vital signs reviewed. All other systems reviewed and negative except as noted above.     Physical Exam     ED Triage Vitals [11/24/21 1048]   /73   Pulse 50   Resp 16   Temp 98 °F (36.7 °C)   Temp src Temporal the ER precautions. I explained to the patient that emergent conditions may arise to return to the immediate care or ER for new, worsening or any persistent conditions.   I've explained the importance of following up with his doctor- Breanne Dc,

## 2021-12-17 NOTE — TELEPHONE ENCOUNTER
Pt calling to check on the status of his referral with Dr. Veronica Browning and referral is still \"opened\" pt gave fax number because they will not schedule his appt until authorized.  Please fax to Dr. Barbosa Box once authorized     Fax # [phone]
Pt calling to check on the status of referral  [id number] Ordered 8/3/20.
no

## 2022-03-24 ENCOUNTER — TELEPHONE (OUTPATIENT)
Dept: INTERNAL MEDICINE CLINIC | Facility: CLINIC | Age: 77
End: 2022-03-24

## 2022-03-24 NOTE — TELEPHONE ENCOUNTER
Please call pt  He has a 20 minute appt scheduled today(friday) for ear issue,neck issue, foot issue, back issue , low energy and  insomnia   Not realistic  Will have to pick 2 things for 1 visit, the 2 things bothering him most

## 2022-03-25 ENCOUNTER — OFFICE VISIT (OUTPATIENT)
Dept: INTERNAL MEDICINE CLINIC | Facility: CLINIC | Age: 77
End: 2022-03-25
Payer: COMMERCIAL

## 2022-03-25 VITALS
SYSTOLIC BLOOD PRESSURE: 120 MMHG | WEIGHT: 181 LBS | TEMPERATURE: 98 F | HEART RATE: 59 BPM | RESPIRATION RATE: 18 BRPM | OXYGEN SATURATION: 97 % | BODY MASS INDEX: 27 KG/M2 | DIASTOLIC BLOOD PRESSURE: 70 MMHG

## 2022-03-25 DIAGNOSIS — R10.9 RIGHT FLANK PAIN: ICD-10-CM

## 2022-03-25 DIAGNOSIS — Z12.5 PROSTATE CANCER SCREENING: ICD-10-CM

## 2022-03-25 DIAGNOSIS — G62.9 NEUROPATHY: Primary | ICD-10-CM

## 2022-03-25 DIAGNOSIS — R39.9 LOWER URINARY TRACT SYMPTOMS (LUTS): ICD-10-CM

## 2022-03-25 DIAGNOSIS — M54.2 NECK PAIN: ICD-10-CM

## 2022-03-25 DIAGNOSIS — N40.0 BENIGN PROSTATIC HYPERPLASIA, UNSPECIFIED WHETHER LOWER URINARY TRACT SYMPTOMS PRESENT: ICD-10-CM

## 2022-03-25 PROBLEM — R61 NIGHT SWEATS: Status: RESOLVED | Noted: 2021-06-01 | Resolved: 2022-03-25

## 2022-03-25 PROCEDURE — 99214 OFFICE O/P EST MOD 30 MIN: CPT | Performed by: INTERNAL MEDICINE

## 2022-03-25 PROCEDURE — 3078F DIAST BP <80 MM HG: CPT | Performed by: INTERNAL MEDICINE

## 2022-03-25 PROCEDURE — 3074F SYST BP LT 130 MM HG: CPT | Performed by: INTERNAL MEDICINE

## 2022-03-25 RX ORDER — TAMSULOSIN HYDROCHLORIDE 0.4 MG/1
CAPSULE ORAL DAILY
COMMUNITY
End: 2022-03-25

## 2022-03-25 RX ORDER — SILDENAFIL 100 MG/1
100 TABLET, FILM COATED ORAL
Qty: 30 TABLET | Refills: 0 | Status: SHIPPED | OUTPATIENT
Start: 2022-03-25

## 2022-03-25 RX ORDER — TAMSULOSIN HYDROCHLORIDE 0.4 MG/1
0.4 CAPSULE ORAL 2 TIMES DAILY
Qty: 180 CAPSULE | Refills: 1 | Status: SHIPPED | OUTPATIENT
Start: 2022-03-25 | End: 2022-12-20

## 2022-04-16 ENCOUNTER — HOSPITAL ENCOUNTER (OUTPATIENT)
Age: 77
Discharge: HOME | End: 2022-04-16
Payer: MEDICARE

## 2022-04-16 VITALS
HEIGHT: 69 IN | BODY MASS INDEX: 27.4 KG/M2 | RESPIRATION RATE: 18 BRPM | DIASTOLIC BLOOD PRESSURE: 74 MMHG | TEMPERATURE: 97 F | OXYGEN SATURATION: 97 % | SYSTOLIC BLOOD PRESSURE: 158 MMHG | HEART RATE: 48 BPM | WEIGHT: 185 LBS

## 2022-04-16 DIAGNOSIS — Z20.822 CLOSE EXPOSURE TO 2019 NOVEL CORONAVIRUS: Primary | ICD-10-CM

## 2022-04-16 LAB — SARS-COV-2 RNA RESP QL NAA+PROBE: NOT DETECTED

## 2022-04-26 ENCOUNTER — TELEPHONE (OUTPATIENT)
Dept: INTERNAL MEDICINE CLINIC | Facility: CLINIC | Age: 77
End: 2022-04-26

## 2022-05-17 ENCOUNTER — OFFICE VISIT (OUTPATIENT)
Dept: NEUROLOGY | Facility: CLINIC | Age: 77
End: 2022-05-17
Payer: COMMERCIAL

## 2022-05-17 VITALS
SYSTOLIC BLOOD PRESSURE: 102 MMHG | DIASTOLIC BLOOD PRESSURE: 52 MMHG | HEART RATE: 51 BPM | RESPIRATION RATE: 16 BRPM | HEIGHT: 69 IN | BODY MASS INDEX: 27.4 KG/M2 | WEIGHT: 185 LBS

## 2022-05-17 DIAGNOSIS — G57.62 MORTON'S NEUROMA OF LEFT FOOT: Primary | ICD-10-CM

## 2022-05-17 PROCEDURE — 3078F DIAST BP <80 MM HG: CPT | Performed by: OTHER

## 2022-05-17 PROCEDURE — 99203 OFFICE O/P NEW LOW 30 MIN: CPT | Performed by: OTHER

## 2022-05-17 PROCEDURE — 3074F SYST BP LT 130 MM HG: CPT | Performed by: OTHER

## 2022-05-17 PROCEDURE — 3008F BODY MASS INDEX DOCD: CPT | Performed by: OTHER

## 2022-06-21 ENCOUNTER — LAB ENCOUNTER (OUTPATIENT)
Dept: LAB | Age: 77
End: 2022-06-21
Attending: UROLOGY
Payer: MEDICARE

## 2022-06-21 ENCOUNTER — OFFICE VISIT (OUTPATIENT)
Dept: SURGERY | Facility: CLINIC | Age: 77
End: 2022-06-21
Payer: COMMERCIAL

## 2022-06-21 DIAGNOSIS — N13.8 BPH WITH OBSTRUCTION/LOWER URINARY TRACT SYMPTOMS: Primary | ICD-10-CM

## 2022-06-21 DIAGNOSIS — N40.0 BENIGN PROSTATIC HYPERPLASIA, UNSPECIFIED WHETHER LOWER URINARY TRACT SYMPTOMS PRESENT: ICD-10-CM

## 2022-06-21 DIAGNOSIS — R82.90 URINE FINDING: ICD-10-CM

## 2022-06-21 DIAGNOSIS — N40.1 BPH WITH OBSTRUCTION/LOWER URINARY TRACT SYMPTOMS: Primary | ICD-10-CM

## 2022-06-21 DIAGNOSIS — Z12.5 SCREENING PSA (PROSTATE SPECIFIC ANTIGEN): ICD-10-CM

## 2022-06-21 DIAGNOSIS — Z12.5 PROSTATE CANCER SCREENING: ICD-10-CM

## 2022-06-21 DIAGNOSIS — N52.9 ERECTILE DYSFUNCTION, UNSPECIFIED ERECTILE DYSFUNCTION TYPE: ICD-10-CM

## 2022-06-21 DIAGNOSIS — R39.9 LOWER URINARY TRACT SYMPTOMS (LUTS): ICD-10-CM

## 2022-06-21 LAB
APPEARANCE: CLEAR
BILIRUBIN: NEGATIVE
COMPLEXED PSA SERPL-MCNC: 1.96 NG/ML (ref ?–4)
GLUCOSE (URINE DIPSTICK): NEGATIVE MG/DL
KETONES (URINE DIPSTICK): NEGATIVE MG/DL
LEUKOCYTES: NEGATIVE
MULTISTIX LOT#: NORMAL NUMERIC
NITRITE, URINE: NEGATIVE
OCCULT BLOOD: NEGATIVE
PH, URINE: 5.5 (ref 4.5–8)
PROTEIN (URINE DIPSTICK): NEGATIVE MG/DL
SPECIFIC GRAVITY: 1.02 (ref 1–1.03)
URINE-COLOR: YELLOW
UROBILINOGEN,SEMI-QN: 0.2 MG/DL (ref 0–1.9)

## 2022-06-21 PROCEDURE — 36415 COLL VENOUS BLD VENIPUNCTURE: CPT

## 2022-06-21 PROCEDURE — 81003 URINALYSIS AUTO W/O SCOPE: CPT | Performed by: UROLOGY

## 2022-06-21 PROCEDURE — 99204 OFFICE O/P NEW MOD 45 MIN: CPT | Performed by: UROLOGY

## 2022-06-21 RX ORDER — TAMSULOSIN HYDROCHLORIDE 0.4 MG/1
0.4 CAPSULE ORAL 2 TIMES DAILY
Qty: 180 CAPSULE | Refills: 1 | Status: SHIPPED | OUTPATIENT
Start: 2022-06-21 | End: 2023-03-18

## 2022-06-21 RX ORDER — FINASTERIDE 5 MG/1
5 TABLET, FILM COATED ORAL DAILY
Qty: 90 TABLET | Refills: 6 | Status: SHIPPED | OUTPATIENT
Start: 2022-06-21

## 2022-06-21 RX ORDER — SILDENAFIL 100 MG/1
100 TABLET, FILM COATED ORAL
Qty: 30 TABLET | Refills: 5 | Status: SHIPPED | OUTPATIENT
Start: 2022-06-21

## 2022-07-11 ENCOUNTER — TELEPHONE (OUTPATIENT)
Dept: INTERNAL MEDICINE CLINIC | Facility: CLINIC | Age: 77
End: 2022-07-11

## 2022-07-11 DIAGNOSIS — L57.0 ACTINIC KERATOSES: Primary | ICD-10-CM

## 2022-07-11 NOTE — TELEPHONE ENCOUNTER
Provider's Name?:  Dr Elsa Clarke?:  derm  Reason for Visit?:  FU   Diagnosis?:  Skin exam   Number of Visits Requested?:  3  Last Visit with Specialist?: 6/27/21  Is Appt. Already Scheduled?:  No        If so, Date?:    Does pt need call back?: no     Received fax for referral from [facility].  Placed in triage in basket

## 2022-07-12 NOTE — TELEPHONE ENCOUNTER
Referral placed according to fax pt has appt 7/26/22 w/dr Wilson Sos message sent to referral department   Will notify pt when authorized

## 2022-08-23 ENCOUNTER — TELEPHONE (OUTPATIENT)
Dept: INTERNAL MEDICINE CLINIC | Facility: CLINIC | Age: 77
End: 2022-08-23

## 2022-08-23 ENCOUNTER — LAB ENCOUNTER (OUTPATIENT)
Dept: LAB | Age: 77
End: 2022-08-23
Attending: INTERNAL MEDICINE
Payer: MEDICARE

## 2022-08-23 ENCOUNTER — OFFICE VISIT (OUTPATIENT)
Dept: INTERNAL MEDICINE CLINIC | Facility: CLINIC | Age: 77
End: 2022-08-23
Payer: COMMERCIAL

## 2022-08-23 VITALS
BODY MASS INDEX: 27.59 KG/M2 | WEIGHT: 177.88 LBS | HEIGHT: 67.5 IN | DIASTOLIC BLOOD PRESSURE: 60 MMHG | SYSTOLIC BLOOD PRESSURE: 110 MMHG | HEART RATE: 52 BPM | RESPIRATION RATE: 12 BRPM | TEMPERATURE: 98 F

## 2022-08-23 DIAGNOSIS — R10.13 EPIGASTRIC PAIN: ICD-10-CM

## 2022-08-23 DIAGNOSIS — R09.89 DIMINISHED PULSES IN LOWER EXTREMITY: ICD-10-CM

## 2022-08-23 DIAGNOSIS — Z00.00 ENCOUNTER FOR ANNUAL HEALTH EXAMINATION: Primary | ICD-10-CM

## 2022-08-23 DIAGNOSIS — I25.10 CORONARY ARTERY DISEASE INVOLVING NATIVE CORONARY ARTERY OF NATIVE HEART WITHOUT ANGINA PECTORIS: ICD-10-CM

## 2022-08-23 DIAGNOSIS — E78.00 PURE HYPERCHOLESTEROLEMIA: ICD-10-CM

## 2022-08-23 DIAGNOSIS — R39.9 LOWER URINARY TRACT SYMPTOMS (LUTS): ICD-10-CM

## 2022-08-23 DIAGNOSIS — I25.10 CORONARY ARTERY DISEASE INVOLVING NATIVE CORONARY ARTERY OF NATIVE HEART WITHOUT ANGINA PECTORIS: Primary | ICD-10-CM

## 2022-08-23 DIAGNOSIS — M47.817 FACET ARTHRITIS OF LUMBOSACRAL REGION: ICD-10-CM

## 2022-08-23 DIAGNOSIS — E03.9 ACQUIRED HYPOTHYROIDISM: ICD-10-CM

## 2022-08-23 DIAGNOSIS — M54.9 UPPER BACK PAIN ON RIGHT SIDE: ICD-10-CM

## 2022-08-23 DIAGNOSIS — D69.6 THROMBOCYTOPENIA (HCC): ICD-10-CM

## 2022-08-23 LAB
ALBUMIN SERPL-MCNC: 3.6 G/DL (ref 3.4–5)
ALBUMIN/GLOB SERPL: 1.2 {RATIO} (ref 1–2)
ALP LIVER SERPL-CCNC: 54 U/L
ALT SERPL-CCNC: 32 U/L
ANION GAP SERPL CALC-SCNC: 6 MMOL/L (ref 0–18)
AST SERPL-CCNC: 17 U/L (ref 15–37)
BASOPHILS # BLD AUTO: 0.02 X10(3) UL (ref 0–0.2)
BASOPHILS NFR BLD AUTO: 0.4 %
BILIRUB SERPL-MCNC: 1.2 MG/DL (ref 0.1–2)
BUN BLD-MCNC: 18 MG/DL (ref 7–18)
CALCIUM BLD-MCNC: 8.7 MG/DL (ref 8.5–10.1)
CHLORIDE SERPL-SCNC: 114 MMOL/L (ref 98–112)
CHOLEST SERPL-MCNC: 104 MG/DL (ref ?–200)
CO2 SERPL-SCNC: 23 MMOL/L (ref 21–32)
CREAT BLD-MCNC: 0.79 MG/DL
EOSINOPHIL # BLD AUTO: 0.09 X10(3) UL (ref 0–0.7)
EOSINOPHIL NFR BLD AUTO: 1.9 %
ERYTHROCYTE [DISTWIDTH] IN BLOOD BY AUTOMATED COUNT: 12.7 %
FASTING PATIENT LIPID ANSWER: YES
FASTING STATUS PATIENT QL REPORTED: YES
GFR SERPLBLD BASED ON 1.73 SQ M-ARVRAT: 91 ML/MIN/1.73M2 (ref 60–?)
GLOBULIN PLAS-MCNC: 2.9 G/DL (ref 2.8–4.4)
GLUCOSE BLD-MCNC: 95 MG/DL (ref 70–99)
HCT VFR BLD AUTO: 45.2 %
HDLC SERPL-MCNC: 67 MG/DL (ref 40–59)
HGB BLD-MCNC: 15.6 G/DL
IMM GRANULOCYTES # BLD AUTO: 0.01 X10(3) UL (ref 0–1)
IMM GRANULOCYTES NFR BLD: 0.2 %
LDLC SERPL CALC-MCNC: 25 MG/DL (ref ?–100)
LYMPHOCYTES # BLD AUTO: 1.29 X10(3) UL (ref 1–4)
LYMPHOCYTES NFR BLD AUTO: 27.5 %
MCH RBC QN AUTO: 33.4 PG (ref 26–34)
MCHC RBC AUTO-ENTMCNC: 34.5 G/DL (ref 31–37)
MCV RBC AUTO: 96.8 FL
MONOCYTES # BLD AUTO: 0.41 X10(3) UL (ref 0.1–1)
MONOCYTES NFR BLD AUTO: 8.7 %
NEUTROPHILS # BLD AUTO: 2.87 X10 (3) UL (ref 1.5–7.7)
NEUTROPHILS # BLD AUTO: 2.87 X10(3) UL (ref 1.5–7.7)
NEUTROPHILS NFR BLD AUTO: 61.3 %
NONHDLC SERPL-MCNC: 37 MG/DL (ref ?–130)
OSMOLALITY SERPL CALC.SUM OF ELEC: 298 MOSM/KG (ref 275–295)
PLATELET # BLD AUTO: 125 10(3)UL (ref 150–450)
POTASSIUM SERPL-SCNC: 4 MMOL/L (ref 3.5–5.1)
PROT SERPL-MCNC: 6.5 G/DL (ref 6.4–8.2)
RBC # BLD AUTO: 4.67 X10(6)UL
SODIUM SERPL-SCNC: 143 MMOL/L (ref 136–145)
TRIGL SERPL-MCNC: 45 MG/DL (ref 30–149)
TSI SER-ACNC: 0.02 MIU/ML (ref 0.36–3.74)
VLDLC SERPL CALC-MCNC: 6 MG/DL (ref 0–30)
WBC # BLD AUTO: 4.7 X10(3) UL (ref 4–11)

## 2022-08-23 PROCEDURE — 3008F BODY MASS INDEX DOCD: CPT | Performed by: INTERNAL MEDICINE

## 2022-08-23 PROCEDURE — 99397 PER PM REEVAL EST PAT 65+ YR: CPT | Performed by: INTERNAL MEDICINE

## 2022-08-23 PROCEDURE — 3078F DIAST BP <80 MM HG: CPT | Performed by: INTERNAL MEDICINE

## 2022-08-23 PROCEDURE — 84443 ASSAY THYROID STIM HORMONE: CPT

## 2022-08-23 PROCEDURE — 80061 LIPID PANEL: CPT

## 2022-08-23 PROCEDURE — G0439 PPPS, SUBSEQ VISIT: HCPCS | Performed by: INTERNAL MEDICINE

## 2022-08-23 PROCEDURE — 80053 COMPREHEN METABOLIC PANEL: CPT

## 2022-08-23 PROCEDURE — 85025 COMPLETE CBC W/AUTO DIFF WBC: CPT

## 2022-08-23 PROCEDURE — 96160 PT-FOCUSED HLTH RISK ASSMT: CPT | Performed by: INTERNAL MEDICINE

## 2022-08-23 PROCEDURE — 36415 COLL VENOUS BLD VENIPUNCTURE: CPT

## 2022-08-23 PROCEDURE — 1125F AMNT PAIN NOTED PAIN PRSNT: CPT | Performed by: INTERNAL MEDICINE

## 2022-08-23 PROCEDURE — 3074F SYST BP LT 130 MM HG: CPT | Performed by: INTERNAL MEDICINE

## 2022-08-23 NOTE — TELEPHONE ENCOUNTER
Patient's current cardiologist is no longer in patient's insurance network.   Does Dr Cory Martinez have a recommendation for a new cardiologist?

## 2022-08-24 NOTE — TELEPHONE ENCOUNTER
He can see Dr. Keo Avila or any of his providers at [facility]. Referral placed. Please inform patient, thanks!

## 2022-08-25 NOTE — TELEPHONE ENCOUNTER
Referral is authorized   Informed pt, and informed him he can see referral in referrals section of mychart   Pt requested mychart be sent with referral information   mychart sent   Pt had no additional questions

## 2022-09-15 ENCOUNTER — TELEPHONE (OUTPATIENT)
Dept: INTERNAL MEDICINE CLINIC | Facility: CLINIC | Age: 77
End: 2022-09-15

## 2022-09-15 RX ORDER — NICOTINE POLACRILEX 4 MG/1
20 GUM, CHEWING ORAL DAILY
Qty: 30 TABLET | Refills: 0 | Status: SHIPPED | OUTPATIENT
Start: 2022-09-15 | End: 2022-10-15

## 2022-09-15 NOTE — TELEPHONE ENCOUNTER
Per Dr Monie Combs note pm 8/23: 10. Epigastric pain  Exam benign. Not exertional, does not sound cardiac. Can try omeprazole 20mg daily for 2 weeks. He should reach out to cardiology to see if they would recommend a work up based on his symptoms. Last stress test was 1/2021 and it was negative    Called the pt and pt eating clean diet but still gets heartburn and takes pepcid daily . Advised the pt to take omeprazole. Rx send to pt . Pt will try it. Pt is trying to get in with cardiologist.     Please advise any other recommendation.

## 2022-09-15 NOTE — TELEPHONE ENCOUNTER
Pt called and stated that he has had indigestion for the last 5 days. Taking OTC pepcid and it is helping and it keeps coming back.  Pt wondering if there is anything else that he should be doing

## 2022-09-16 NOTE — TELEPHONE ENCOUNTER
Spoke w pt he said once he started taking the omeprazole his heartburn has settled down   Advised pt if omeprazole stops working or if heartburn comes back to make an appt for further evaluation   Pt verbalized understanding and had no additional questions

## 2022-09-22 ENCOUNTER — HOSPITAL ENCOUNTER (OUTPATIENT)
Dept: ULTRASOUND IMAGING | Facility: HOSPITAL | Age: 77
Discharge: HOME | End: 2022-09-22
Attending: Other

## 2022-09-22 DIAGNOSIS — G57.62 MORTON'S NEUROMA OF LEFT FOOT: ICD-10-CM

## 2022-09-22 PROCEDURE — 76881 US COMPL JOINT R-T W/IMG: CPT | Performed by: OTHER

## 2022-09-23 NOTE — PROGRESS NOTES
Ultrasound of the foot showed some nonspecific findings. I am not sure if the findings are referring to a neuroma.    However if you are still having symptoms of pain, maybe as suggested MRI of Foot would reveal better information    Also reach out to us if interested to try medication to control the pain    Dr. Lan Valles

## 2022-10-24 DIAGNOSIS — E03.9 ACQUIRED HYPOTHYROIDISM: ICD-10-CM

## 2022-10-24 RX ORDER — LEVOTHYROXINE SODIUM 175 UG/1
175 TABLET ORAL DAILY
Qty: 90 TABLET | Refills: 3 | OUTPATIENT
Start: 2022-10-24

## 2022-10-25 DIAGNOSIS — E03.9 ACQUIRED HYPOTHYROIDISM: ICD-10-CM

## 2022-10-25 RX ORDER — LEVOTHYROXINE SODIUM 0.15 MG/1
TABLET ORAL
Qty: 60 TABLET | Refills: 0 | OUTPATIENT
Start: 2022-10-25

## 2022-10-25 RX ORDER — LEVOTHYROXINE SODIUM 0.15 MG/1
150 TABLET ORAL
Qty: 30 TABLET | Refills: 0 | Status: SHIPPED | OUTPATIENT
Start: 2022-10-25

## 2022-10-25 NOTE — TELEPHONE ENCOUNTER
Last OV pertinent to medication 8/23/22  Last refill date: 8/24/22 #/refills: 60 w/ 0   When patient was asked to return for OV: 1 month  Upcomming appt/reason: none scheduled. Labs:   My chart message sent to patient to schedule labs and office visit.

## 2022-10-25 NOTE — TELEPHONE ENCOUNTER
Ok to fill ? rx pended     Future Appointments   Date Time Provider Wei Sonia   10/31/2022  8:20 AM Liz Landrum MD [facility] N Nicolette Deng   12/27/2022 11:00 AM Alexandre Krishnamurthy MD [facility] EC Nap 4

## 2022-10-25 NOTE — TELEPHONE ENCOUNTER
Patient scheduled an appt with Dr Arya Hutchins on October 31st at 8:20. He will complete his thyroid lab before the appt. He is out of medication and needs a small supply until his appt.

## 2022-10-26 ENCOUNTER — LAB ENCOUNTER (OUTPATIENT)
Dept: LAB | Facility: HOSPITAL | Age: 77
End: 2022-10-26
Attending: INTERNAL MEDICINE
Payer: MEDICARE

## 2022-10-26 DIAGNOSIS — E03.9 ACQUIRED HYPOTHYROIDISM: ICD-10-CM

## 2022-10-26 DIAGNOSIS — E03.9 ACQUIRED HYPOTHYROIDISM: Primary | ICD-10-CM

## 2022-10-26 LAB — TSI SER-ACNC: 0.18 MIU/ML (ref 0.36–3.74)

## 2022-10-26 PROCEDURE — 84443 ASSAY THYROID STIM HORMONE: CPT

## 2022-10-26 PROCEDURE — 36415 COLL VENOUS BLD VENIPUNCTURE: CPT

## 2022-10-31 ENCOUNTER — OFFICE VISIT (OUTPATIENT)
Dept: INTERNAL MEDICINE CLINIC | Facility: CLINIC | Age: 77
End: 2022-10-31
Payer: COMMERCIAL

## 2022-10-31 VITALS
TEMPERATURE: 97 F | RESPIRATION RATE: 12 BRPM | SYSTOLIC BLOOD PRESSURE: 116 MMHG | DIASTOLIC BLOOD PRESSURE: 60 MMHG | HEART RATE: 52 BPM | HEIGHT: 67.5 IN | WEIGHT: 182.81 LBS | BODY MASS INDEX: 28.36 KG/M2

## 2022-10-31 DIAGNOSIS — I25.10 CORONARY ARTERY DISEASE INVOLVING NATIVE CORONARY ARTERY OF NATIVE HEART WITHOUT ANGINA PECTORIS: ICD-10-CM

## 2022-10-31 DIAGNOSIS — R41.3 MEMORY DIFFICULTY: ICD-10-CM

## 2022-10-31 DIAGNOSIS — E78.00 PURE HYPERCHOLESTEROLEMIA: ICD-10-CM

## 2022-10-31 DIAGNOSIS — E03.9 ACQUIRED HYPOTHYROIDISM: ICD-10-CM

## 2022-10-31 DIAGNOSIS — K21.9 GASTROESOPHAGEAL REFLUX DISEASE, UNSPECIFIED WHETHER ESOPHAGITIS PRESENT: Primary | ICD-10-CM

## 2022-10-31 PROCEDURE — 3078F DIAST BP <80 MM HG: CPT | Performed by: INTERNAL MEDICINE

## 2022-10-31 PROCEDURE — 3074F SYST BP LT 130 MM HG: CPT | Performed by: INTERNAL MEDICINE

## 2022-10-31 PROCEDURE — 3008F BODY MASS INDEX DOCD: CPT | Performed by: INTERNAL MEDICINE

## 2022-10-31 PROCEDURE — 99214 OFFICE O/P EST MOD 30 MIN: CPT | Performed by: INTERNAL MEDICINE

## 2022-11-15 ENCOUNTER — TELEPHONE (OUTPATIENT)
Dept: INTERNAL MEDICINE CLINIC | Facility: CLINIC | Age: 77
End: 2022-11-15

## 2022-11-15 DIAGNOSIS — E03.9 ACQUIRED HYPOTHYROIDISM: ICD-10-CM

## 2022-11-15 RX ORDER — LEVOTHYROXINE SODIUM 0.15 MG/1
150 TABLET ORAL
Qty: 30 TABLET | Refills: 0 | OUTPATIENT
Start: 2022-11-15

## 2022-11-15 NOTE — TELEPHONE ENCOUNTER
Patient called about the medication Metoprolol Succinate ER 25 MG Oral Tablet 24 Hr   He said he is unsure if he is supposed to be taking this medication or not. And the prescribing doctor Sandy Ventura is no longer treating him.

## 2022-11-15 NOTE — TELEPHONE ENCOUNTER
Is this medication prescribed by the [facility] Providers? yes    Did the patient contact the pharmacy directly?:  yes    Is patient out of meds or supply very low?:  5 days left    Medication Requested: levothyroxine 150 MCG Oral Tab    Dose:       Is patient requesting a 30 or 90 day supply?:  90    Pharmacy name and phone # or location:  [facility]PH[facility]ACY #[id number]- [address] [facility], [address] [place] [address], [phone]    Is the patient due for an appointment?: no  (if so, please schedule appt)  8/23/22    Additional Notes:  no    Please advise the patient refills take up to 72 business hours.     *Patient wants a 90 day supply*

## 2022-11-16 DIAGNOSIS — E03.9 ACQUIRED HYPOTHYROIDISM: ICD-10-CM

## 2022-11-16 RX ORDER — LEVOTHYROXINE SODIUM 0.15 MG/1
TABLET ORAL
Qty: 30 TABLET | Refills: 0 | OUTPATIENT
Start: 2022-11-16

## 2022-12-13 ENCOUNTER — PATIENT MESSAGE (OUTPATIENT)
Dept: INTERNAL MEDICINE CLINIC | Facility: CLINIC | Age: 77
End: 2022-12-13

## 2022-12-13 DIAGNOSIS — E03.9 ACQUIRED HYPOTHYROIDISM: ICD-10-CM

## 2022-12-13 RX ORDER — NICOTINE POLACRILEX 4 MG/1
20 GUM, CHEWING ORAL DAILY
Qty: 90 TABLET | Refills: 0 | Status: SHIPPED | OUTPATIENT
Start: 2022-12-13 | End: 2023-01-12

## 2022-12-13 RX ORDER — LEVOTHYROXINE SODIUM 0.15 MG/1
150 TABLET ORAL
Qty: 30 TABLET | Refills: 1 | Status: SHIPPED | OUTPATIENT
Start: 2022-12-13

## 2022-12-13 NOTE — TELEPHONE ENCOUNTER
From: Luann Wheeler  To: Abbey Hutchinson MD  Sent: 12/13/2022 4:27 PM CST  Subject: Medication refill    Please have Dr. Nicolas Woodard ok a refill for Levothyroxine 150 MCG With [facility] for a 90-day refill. I have been using the [facility]-day [facility]ill with [facility], I prefer 90 days with [facility]. I have enough for 12 days on hand. Also, I am requesting that Omeprazole 20 mg be put on prescription for 90 days as the over the counter is sold for 2 week increments and is becoming expensive.     Thank you, Uzma Carter

## 2022-12-14 DIAGNOSIS — E03.9 ACQUIRED HYPOTHYROIDISM: ICD-10-CM

## 2022-12-14 RX ORDER — LEVOTHYROXINE SODIUM 0.15 MG/1
TABLET ORAL
Qty: 30 TABLET | Refills: 1 | OUTPATIENT
Start: 2022-12-14

## 2022-12-14 NOTE — TELEPHONE ENCOUNTER
FYI    Call transferred to Supervisor due to pt upset regarding medication refills being sent to the wrong pharmacy. Reviewed with pt levothyroxine 150mcg was sent 30 with 1 refill to [facility] (mail order pharmacy) and omeprazole 20mg 90 day supply sent to [facility]. [facility] was [facility] pt wanted medication sent too. Informed pt he is due to get his lab TSH done again and order is in the system. Also reminded him he needs to schedule an appointment with GI. Gave pt Dr. Paul Bellamy from referral placed on 10/31/22. Noticed the referral was still in open status and sent an email to referral dept to check status/process referral.    Pt voiced understanding and was appreciative.  He would be going to get his labs done and calling to make an appointment with GI.

## 2022-12-14 NOTE — TELEPHONE ENCOUNTER
Refills done. Needs labs as he was already informed. Also he was to see gi for the gerd symptoms. Please remind him to make an appt.

## 2022-12-15 ENCOUNTER — TELEPHONE (OUTPATIENT)
Dept: INTERNAL MEDICINE CLINIC | Facility: CLINIC | Age: 77
End: 2022-12-15

## 2022-12-15 NOTE — TELEPHONE ENCOUNTER
Incoming (mail or fax):  fax  Received from:  [facility]   Documentation given to:  Triage in basket     Needs to be signed and returned

## 2022-12-21 ENCOUNTER — MED REC SCAN ONLY (OUTPATIENT)
Dept: INTERNAL MEDICINE CLINIC | Facility: CLINIC | Age: 77
End: 2022-12-21

## 2022-12-26 ENCOUNTER — LAB ENCOUNTER (OUTPATIENT)
Dept: LAB | Facility: HOSPITAL | Age: 77
End: 2022-12-26
Attending: NURSE PRACTITIONER
Payer: MEDICARE

## 2022-12-26 DIAGNOSIS — E03.9 ACQUIRED HYPOTHYROIDISM: ICD-10-CM

## 2022-12-26 LAB — TSI SER-ACNC: 0.3 MIU/ML (ref 0.36–3.74)

## 2022-12-26 PROCEDURE — 36415 COLL VENOUS BLD VENIPUNCTURE: CPT

## 2022-12-26 PROCEDURE — 84443 ASSAY THYROID STIM HORMONE: CPT

## 2022-12-27 ENCOUNTER — OFFICE VISIT (OUTPATIENT)
Dept: SURGERY | Facility: CLINIC | Age: 77
End: 2022-12-27
Payer: COMMERCIAL

## 2022-12-27 DIAGNOSIS — N40.0 BENIGN PROSTATIC HYPERPLASIA, UNSPECIFIED WHETHER LOWER URINARY TRACT SYMPTOMS PRESENT: ICD-10-CM

## 2022-12-27 DIAGNOSIS — N52.9 ERECTILE DYSFUNCTION, UNSPECIFIED ERECTILE DYSFUNCTION TYPE: ICD-10-CM

## 2022-12-27 DIAGNOSIS — N39.41 URGE INCONTINENCE: ICD-10-CM

## 2022-12-27 DIAGNOSIS — N40.1 BPH WITH OBSTRUCTION/LOWER URINARY TRACT SYMPTOMS: Primary | ICD-10-CM

## 2022-12-27 DIAGNOSIS — N13.8 BPH WITH OBSTRUCTION/LOWER URINARY TRACT SYMPTOMS: Primary | ICD-10-CM

## 2022-12-27 PROCEDURE — 99214 OFFICE O/P EST MOD 30 MIN: CPT | Performed by: UROLOGY

## 2022-12-27 RX ORDER — OMEPRAZOLE 20 MG/1
CAPSULE, DELAYED RELEASE ORAL
COMMUNITY
Start: 2022-12-14

## 2022-12-27 RX ORDER — FINASTERIDE 5 MG/1
5 TABLET, FILM COATED ORAL DAILY
Qty: 90 TABLET | Refills: 6 | Status: SHIPPED | OUTPATIENT
Start: 2022-12-27

## 2022-12-27 RX ORDER — TAMSULOSIN HYDROCHLORIDE 0.4 MG/1
0.4 CAPSULE ORAL 2 TIMES DAILY
Qty: 180 CAPSULE | Refills: 1 | Status: SHIPPED | OUTPATIENT
Start: 2022-12-27 | End: 2023-09-23

## 2022-12-27 NOTE — PATIENT INSTRUCTIONS
Ways to Reduce Nocturia (voiding frequently at night):    Try to drink plenty of water first thing in the morning. Avoid drinking anything at least 2-3 hours before bedtime. Unless you are on a fluid-restriction we typically recommend drinking 40-60 ounces water per day. Avoid/limit dietary irritants such as alcohol, juice, sugary things, citrus fruits, soda, carbonated beverages, coffee (including decaf), tea, spicy foods. Try to exercise at least 3 times per week for at least 30 minutes at a time. We recommend cardiovascular exercise such as jogging, elliptical, biking, speed-walking. Go to bed around the same time every night. This helps maintain normal nightly levels of ADH and melatonin - both of which are needed for a good night's sleep. Practice good sleep hygiene - Try to only use your bed for sleeping and sex. You should specifically try to avoid doing the following in bed: eating/drinking, reading, watching TV, or using your laptop/phone. If you take a diuretic, you may benefit from taking this in the morning rather than the evening. Talk to your PCP if you do take a diuretic at night to see if you can switch. If you take NSAIDs (such as motrin, aleve, ibuprofen, naproxen) you may benefit from taking this at night rather than the morning. These medications tell your kidneys not to work as hard for a few hours. I would NOT recommend taking extra doses of these medications just to sleep better at night. Fluid accumulation in the lower extremities that gets reabsorbed into the circulation at night is another cause for nocturnal polyuria. If you accumulate fluid in your legs we recommend you try to ambulate/move around as much as safely possible and avoid prolonged sitting. If you are sitting we would also recommend you elevate your legs to prevent fluid from accumulating.

## 2023-01-19 DIAGNOSIS — E03.9 ACQUIRED HYPOTHYROIDISM: ICD-10-CM

## 2023-01-19 RX ORDER — LEVOTHYROXINE SODIUM 137 UG/1
137 TABLET ORAL
Qty: 30 TABLET | Refills: 0 | OUTPATIENT
Start: 2023-01-19

## 2023-02-15 RX ORDER — OMEPRAZOLE 20 MG/1
CAPSULE, DELAYED RELEASE ORAL
Qty: 90 CAPSULE | Refills: 0 | Status: SHIPPED | OUTPATIENT
Start: 2023-02-15

## 2023-02-15 NOTE — TELEPHONE ENCOUNTER
Pt informed of  provider's advice to make GI appointment. Referral provider information provided. Pt verbalizes understanding.

## 2023-02-15 NOTE — TELEPHONE ENCOUNTER
Last OV relevant to medication: 10/31/22  Last refill date: restarted 10/31/22  When pt was asked to return for OV: 4/30/23  Upcoming appt/reason:   Future Appointments   Date Time Provider Wei Scott   5/2/2023 12:40 PM Jose Roberto Ramsey MD [facility] N Pedro   6/13/2023 11:00 AM Andreina Meadows MD [facility] EC Nap 4   Was pt informed of any over due labs: none overdue

## 2023-02-15 NOTE — TELEPHONE ENCOUNTER
Refilled omeprazole for 90 days. He needs to see gi, was given a referral at his last visit. Please remind him.

## 2023-02-17 ENCOUNTER — HOSPITAL ENCOUNTER (OUTPATIENT)
Dept: GENERAL RADIOLOGY | Facility: HOSPITAL | Age: 78
Discharge: HOME | End: 2023-02-17
Attending: NURSE PRACTITIONER
Payer: MEDICARE

## 2023-02-17 ENCOUNTER — OFFICE VISIT (OUTPATIENT)
Dept: INTERNAL MEDICINE CLINIC | Facility: CLINIC | Age: 78
End: 2023-02-17
Payer: COMMERCIAL

## 2023-02-17 VITALS
DIASTOLIC BLOOD PRESSURE: 68 MMHG | SYSTOLIC BLOOD PRESSURE: 126 MMHG | RESPIRATION RATE: 14 BRPM | HEIGHT: 67.5 IN | WEIGHT: 186 LBS | TEMPERATURE: 97 F | HEART RATE: 54 BPM | OXYGEN SATURATION: 96 % | BODY MASS INDEX: 28.85 KG/M2

## 2023-02-17 DIAGNOSIS — M25.511 ACUTE PAIN OF RIGHT SHOULDER: Primary | ICD-10-CM

## 2023-02-17 DIAGNOSIS — M25.511 ACUTE PAIN OF RIGHT SHOULDER: ICD-10-CM

## 2023-02-17 PROCEDURE — 99214 OFFICE O/P EST MOD 30 MIN: CPT | Performed by: NURSE PRACTITIONER

## 2023-02-17 PROCEDURE — 3078F DIAST BP <80 MM HG: CPT | Performed by: NURSE PRACTITIONER

## 2023-02-17 PROCEDURE — 1125F AMNT PAIN NOTED PAIN PRSNT: CPT | Performed by: NURSE PRACTITIONER

## 2023-02-17 PROCEDURE — 73030 X-RAY EXAM OF SHOULDER: CPT | Performed by: NURSE PRACTITIONER

## 2023-02-17 PROCEDURE — 3074F SYST BP LT 130 MM HG: CPT | Performed by: NURSE PRACTITIONER

## 2023-02-17 PROCEDURE — 3008F BODY MASS INDEX DOCD: CPT | Performed by: NURSE PRACTITIONER

## 2023-02-20 ENCOUNTER — OFFICE VISIT (OUTPATIENT)
Dept: ORTHOPEDICS CLINIC | Facility: CLINIC | Age: 78
End: 2023-02-20
Payer: COMMERCIAL

## 2023-02-20 VITALS — HEIGHT: 68 IN | BODY MASS INDEX: 27.28 KG/M2 | WEIGHT: 180 LBS

## 2023-02-20 DIAGNOSIS — S46.001A INJURY OF RIGHT ROTATOR CUFF, INITIAL ENCOUNTER: Primary | ICD-10-CM

## 2023-02-20 PROCEDURE — 3008F BODY MASS INDEX DOCD: CPT | Performed by: ORTHOPAEDIC SURGERY

## 2023-02-20 PROCEDURE — 99204 OFFICE O/P NEW MOD 45 MIN: CPT | Performed by: ORTHOPAEDIC SURGERY

## 2023-02-20 PROCEDURE — 1125F AMNT PAIN NOTED PAIN PRSNT: CPT | Performed by: ORTHOPAEDIC SURGERY

## 2023-02-24 ENCOUNTER — HOSPITAL ENCOUNTER (OUTPATIENT)
Dept: MRI IMAGING | Age: 78
Discharge: HOME | End: 2023-02-24
Attending: ORTHOPAEDIC SURGERY
Payer: MEDICARE

## 2023-02-24 DIAGNOSIS — S46.001A INJURY OF RIGHT ROTATOR CUFF, INITIAL ENCOUNTER: ICD-10-CM

## 2023-02-24 PROCEDURE — 73221 MRI JOINT UPR EXTREM W/O DYE: CPT | Performed by: ORTHOPAEDIC SURGERY

## 2023-03-03 ENCOUNTER — OFFICE VISIT (OUTPATIENT)
Dept: ORTHOPEDICS CLINIC | Facility: CLINIC | Age: 78
End: 2023-03-03
Payer: COMMERCIAL

## 2023-03-03 DIAGNOSIS — M12.811 ROTATOR CUFF ARTHROPATHY OF RIGHT SHOULDER: Primary | ICD-10-CM

## 2023-03-03 DIAGNOSIS — M75.21 BICEPS TENDINITIS OF RIGHT UPPER EXTREMITY: ICD-10-CM

## 2023-03-03 PROCEDURE — 20610 DRAIN/INJ JOINT/BURSA W/O US: CPT | Performed by: ORTHOPAEDIC SURGERY

## 2023-03-03 PROCEDURE — 1125F AMNT PAIN NOTED PAIN PRSNT: CPT | Performed by: ORTHOPAEDIC SURGERY

## 2023-03-03 PROCEDURE — 99214 OFFICE O/P EST MOD 30 MIN: CPT | Performed by: ORTHOPAEDIC SURGERY

## 2023-03-03 RX ORDER — KETOROLAC TROMETHAMINE 30 MG/ML
30 INJECTION, SOLUTION INTRAMUSCULAR; INTRAVENOUS ONCE
Status: COMPLETED | OUTPATIENT
Start: 2023-03-03 | End: 2023-03-03

## 2023-03-03 RX ORDER — TRIAMCINOLONE ACETONIDE 40 MG/ML
40 INJECTION, SUSPENSION INTRA-ARTICULAR; INTRAMUSCULAR ONCE
Status: COMPLETED | OUTPATIENT
Start: 2023-03-03 | End: 2023-03-03

## 2023-03-03 RX ADMIN — TRIAMCINOLONE ACETONIDE 40 MG: 40 INJECTION, SUSPENSION INTRA-ARTICULAR; INTRAMUSCULAR at 10:19:00

## 2023-03-03 RX ADMIN — KETOROLAC TROMETHAMINE 30 MG: 30 INJECTION, SOLUTION INTRAMUSCULAR; INTRAVENOUS at 10:19:00

## 2023-03-03 NOTE — PROCEDURES
Right Shoulder Glenohumeral Joint Injection    Name: Luis Rubio   MRN: [id number]  Date: 3/3/2023     Clinical Indications:   Shoulder Osteoarthritis with symptoms refractory to conservative measures. After informed consent, the injection site was marked, sterilized with topical chlorhexidine antiseptic, and locally anesthetized with skin refrigerant. The patient was seated upright and the shoulder was exposed. Using sterile technique: 1 mL of 30mg/mL of Ketorolac, 2 mL of 0.5% Bupivicaine, 2 mL of 1% Lidocaine, and 1 mg of 40mg/mL of Triamcinolone (Kenalog) was injected with a Anterior approach utilizing a 22 gauge needle. A band-aid was applied. The patient tolerated the procedure well. Daniela Simmons. Dash Nolen MD  Knee, Shoulder, & Elbow Surgery / Sports Medicine Specialist  THE [facility]  [place] [address], [place] , [facility]. [email or web address]  t: [phone]  o: [phone]  f: [phone]

## 2023-03-10 ENCOUNTER — TELEPHONE (OUTPATIENT)
Dept: PHYSICAL THERAPY | Facility: HOSPITAL | Age: 78
End: 2023-03-10

## 2023-03-15 ENCOUNTER — OFFICE VISIT (OUTPATIENT)
Dept: PHYSICAL THERAPY | Facility: HOSPITAL | Age: 78
End: 2023-03-15
Attending: ORTHOPAEDIC SURGERY
Payer: MEDICARE

## 2023-03-15 PROCEDURE — 97162 PT EVAL MOD COMPLEX 30 MIN: CPT

## 2023-03-15 PROCEDURE — 97110 THERAPEUTIC EXERCISES: CPT

## 2023-03-20 ENCOUNTER — OFFICE VISIT (OUTPATIENT)
Dept: PHYSICAL THERAPY | Facility: HOSPITAL | Age: 78
End: 2023-03-20
Attending: ORTHOPAEDIC SURGERY
Payer: MEDICARE

## 2023-03-20 DIAGNOSIS — E03.9 ACQUIRED HYPOTHYROIDISM: ICD-10-CM

## 2023-03-20 PROCEDURE — 97110 THERAPEUTIC EXERCISES: CPT

## 2023-03-20 PROCEDURE — 97140 MANUAL THERAPY 1/> REGIONS: CPT

## 2023-03-21 RX ORDER — LEVOTHYROXINE SODIUM 137 UG/1
TABLET ORAL
Qty: 90 TABLET | Refills: 3 | OUTPATIENT
Start: 2023-03-21

## 2023-03-21 RX ORDER — OMEPRAZOLE 20 MG/1
CAPSULE, DELAYED RELEASE ORAL
Qty: 90 CAPSULE | Refills: 0 | OUTPATIENT
Start: 2023-03-21

## 2023-03-21 NOTE — TELEPHONE ENCOUNTER
[facility] rx request received for 1 year supply of levothyroxine. Pt is due for TSH after levothyroxine dosage change. Pt states he has 2 weeks supply on hand at home. He will stop Biotin supplements x 3 days then have lab draw. [facility] rx request denied pending lab results.

## 2023-03-27 ENCOUNTER — OFFICE VISIT (OUTPATIENT)
Dept: PHYSICAL THERAPY | Facility: HOSPITAL | Age: 78
End: 2023-03-27
Attending: ORTHOPAEDIC SURGERY
Payer: MEDICARE

## 2023-03-27 PROCEDURE — 97140 MANUAL THERAPY 1/> REGIONS: CPT

## 2023-03-27 PROCEDURE — 97110 THERAPEUTIC EXERCISES: CPT

## 2023-03-28 ENCOUNTER — LAB ENCOUNTER (OUTPATIENT)
Dept: LAB | Facility: HOSPITAL | Age: 78
End: 2023-03-28
Attending: INTERNAL MEDICINE
Payer: MEDICARE

## 2023-03-28 DIAGNOSIS — E03.9 ACQUIRED HYPOTHYROIDISM: ICD-10-CM

## 2023-03-28 DIAGNOSIS — E03.9 ACQUIRED HYPOTHYROIDISM: Primary | ICD-10-CM

## 2023-03-28 LAB — TSI SER-ACNC: 0.17 MIU/ML (ref 0.36–3.74)

## 2023-03-28 PROCEDURE — 84443 ASSAY THYROID STIM HORMONE: CPT

## 2023-03-28 PROCEDURE — 36415 COLL VENOUS BLD VENIPUNCTURE: CPT

## 2023-03-28 RX ORDER — LEVOTHYROXINE SODIUM 0.1 MG/1
100 TABLET ORAL
Qty: 30 TABLET | Refills: 1 | Status: SHIPPED | OUTPATIENT
Start: 2023-03-28

## 2023-03-29 ENCOUNTER — OFFICE VISIT (OUTPATIENT)
Dept: PHYSICAL THERAPY | Facility: HOSPITAL | Age: 78
End: 2023-03-29
Attending: ORTHOPAEDIC SURGERY
Payer: MEDICARE

## 2023-03-29 PROCEDURE — 97110 THERAPEUTIC EXERCISES: CPT

## 2023-03-29 PROCEDURE — 97140 MANUAL THERAPY 1/> REGIONS: CPT

## 2023-03-31 ENCOUNTER — APPOINTMENT (OUTPATIENT)
Dept: PHYSICAL THERAPY | Facility: HOSPITAL | Age: 78
End: 2023-03-31
Attending: ORTHOPAEDIC SURGERY
Payer: MEDICARE

## 2023-04-03 ENCOUNTER — TELEPHONE (OUTPATIENT)
Dept: PHYSICAL THERAPY | Facility: HOSPITAL | Age: 78
End: 2023-04-03

## 2023-04-03 ENCOUNTER — APPOINTMENT (OUTPATIENT)
Dept: PHYSICAL THERAPY | Facility: HOSPITAL | Age: 78
End: 2023-04-03
Attending: ORTHOPAEDIC SURGERY
Payer: MEDICARE

## 2023-04-04 ENCOUNTER — TELEPHONE (OUTPATIENT)
Dept: PHYSICAL THERAPY | Facility: HOSPITAL | Age: 78
End: 2023-04-04

## 2023-04-05 ENCOUNTER — OFFICE VISIT (OUTPATIENT)
Dept: PHYSICAL THERAPY | Facility: HOSPITAL | Age: 78
End: 2023-04-05
Attending: ORTHOPAEDIC SURGERY
Payer: MEDICARE

## 2023-04-05 PROCEDURE — 97110 THERAPEUTIC EXERCISES: CPT

## 2023-04-05 PROCEDURE — 97140 MANUAL THERAPY 1/> REGIONS: CPT

## 2023-04-10 ENCOUNTER — LAB ENCOUNTER (OUTPATIENT)
Dept: LAB | Facility: HOSPITAL | Age: 78
End: 2023-04-10
Attending: INTERNAL MEDICINE
Payer: MEDICARE

## 2023-04-10 ENCOUNTER — APPOINTMENT (OUTPATIENT)
Dept: PHYSICAL THERAPY | Facility: HOSPITAL | Age: 78
End: 2023-04-10
Attending: ORTHOPAEDIC SURGERY
Payer: MEDICARE

## 2023-04-10 DIAGNOSIS — E78.2 MIXED HYPERLIPIDEMIA: ICD-10-CM

## 2023-04-10 DIAGNOSIS — I25.10 CORONARY ATHEROSCLEROSIS OF NATIVE CORONARY ARTERY: Primary | ICD-10-CM

## 2023-04-10 LAB
ALBUMIN SERPL-MCNC: 3.7 G/DL (ref 3.4–5)
ALBUMIN/GLOB SERPL: 1.4 {RATIO} (ref 1–2)
ALP LIVER SERPL-CCNC: 57 U/L
ALT SERPL-CCNC: 33 U/L
ANION GAP SERPL CALC-SCNC: 4 MMOL/L (ref 0–18)
AST SERPL-CCNC: 15 U/L (ref 15–37)
BILIRUB SERPL-MCNC: 1.3 MG/DL (ref 0.1–2)
BUN BLD-MCNC: 14 MG/DL (ref 7–18)
CALCIUM BLD-MCNC: 8.9 MG/DL (ref 8.5–10.1)
CHLORIDE SERPL-SCNC: 112 MMOL/L (ref 98–112)
CHOLEST SERPL-MCNC: 122 MG/DL (ref ?–200)
CO2 SERPL-SCNC: 27 MMOL/L (ref 21–32)
CREAT BLD-MCNC: 0.86 MG/DL
FASTING PATIENT LIPID ANSWER: YES
FASTING STATUS PATIENT QL REPORTED: YES
GFR SERPLBLD BASED ON 1.73 SQ M-ARVRAT: 89 ML/MIN/1.73M2 (ref 60–?)
GLOBULIN PLAS-MCNC: 2.7 G/DL (ref 2.8–4.4)
GLUCOSE BLD-MCNC: 102 MG/DL (ref 70–99)
HDLC SERPL-MCNC: 76 MG/DL (ref 40–59)
LDLC SERPL CALC-MCNC: 33 MG/DL (ref ?–100)
NONHDLC SERPL-MCNC: 46 MG/DL (ref ?–130)
OSMOLALITY SERPL CALC.SUM OF ELEC: 297 MOSM/KG (ref 275–295)
POTASSIUM SERPL-SCNC: 4 MMOL/L (ref 3.5–5.1)
PROT SERPL-MCNC: 6.4 G/DL (ref 6.4–8.2)
SODIUM SERPL-SCNC: 143 MMOL/L (ref 136–145)
TRIGL SERPL-MCNC: 58 MG/DL (ref 30–149)
VLDLC SERPL CALC-MCNC: 8 MG/DL (ref 0–30)

## 2023-04-10 PROCEDURE — 80053 COMPREHEN METABOLIC PANEL: CPT

## 2023-04-10 PROCEDURE — 36415 COLL VENOUS BLD VENIPUNCTURE: CPT

## 2023-04-10 PROCEDURE — 80061 LIPID PANEL: CPT

## 2023-04-15 DIAGNOSIS — E03.9 ACQUIRED HYPOTHYROIDISM: ICD-10-CM

## 2023-04-15 DIAGNOSIS — N40.0 BENIGN PROSTATIC HYPERPLASIA, UNSPECIFIED WHETHER LOWER URINARY TRACT SYMPTOMS PRESENT: ICD-10-CM

## 2023-04-17 ENCOUNTER — OFFICE VISIT (OUTPATIENT)
Dept: PHYSICAL THERAPY | Facility: HOSPITAL | Age: 78
End: 2023-04-17
Attending: ORTHOPAEDIC SURGERY
Payer: MEDICARE

## 2023-04-17 PROCEDURE — 97140 MANUAL THERAPY 1/> REGIONS: CPT

## 2023-04-17 PROCEDURE — 97110 THERAPEUTIC EXERCISES: CPT

## 2023-04-17 RX ORDER — TAMSULOSIN HYDROCHLORIDE 0.4 MG/1
CAPSULE ORAL
Qty: 180 CAPSULE | Refills: 0 | Status: SHIPPED | OUTPATIENT
Start: 2023-04-17 | End: 2023-07-16

## 2023-04-18 RX ORDER — LEVOTHYROXINE SODIUM 137 UG/1
137 TABLET ORAL
Qty: 30 TABLET | Refills: 0 | OUTPATIENT
Start: 2023-04-18

## 2023-04-19 DIAGNOSIS — E03.9 ACQUIRED HYPOTHYROIDISM: ICD-10-CM

## 2023-04-19 RX ORDER — LEVOTHYROXINE SODIUM 0.1 MG/1
TABLET ORAL
Qty: 30 TABLET | Refills: 1 | OUTPATIENT
Start: 2023-04-19

## 2023-04-19 NOTE — TELEPHONE ENCOUNTER
Rx sent for 30/1 on 3/28/23. Pt to recheck labs prior to next refill as dose was adjusted. Rx denied.

## 2023-04-21 ENCOUNTER — APPOINTMENT (OUTPATIENT)
Dept: PHYSICAL THERAPY | Facility: HOSPITAL | Age: 78
End: 2023-04-21
Attending: ORTHOPAEDIC SURGERY
Payer: MEDICARE

## 2023-04-21 ENCOUNTER — TELEPHONE (OUTPATIENT)
Dept: PHYSICAL THERAPY | Facility: HOSPITAL | Age: 78
End: 2023-04-21

## 2023-04-26 ENCOUNTER — OFFICE VISIT (OUTPATIENT)
Dept: PHYSICAL THERAPY | Facility: HOSPITAL | Age: 78
End: 2023-04-26
Attending: ORTHOPAEDIC SURGERY
Payer: MEDICARE

## 2023-04-26 PROCEDURE — 97140 MANUAL THERAPY 1/> REGIONS: CPT

## 2023-04-26 PROCEDURE — 97110 THERAPEUTIC EXERCISES: CPT

## 2023-05-01 ENCOUNTER — OFFICE VISIT (OUTPATIENT)
Dept: PHYSICAL THERAPY | Facility: HOSPITAL | Age: 78
End: 2023-05-01
Attending: ORTHOPAEDIC SURGERY
Payer: MEDICARE

## 2023-05-01 PROCEDURE — 97110 THERAPEUTIC EXERCISES: CPT

## 2023-05-02 ENCOUNTER — OFFICE VISIT (OUTPATIENT)
Dept: INTERNAL MEDICINE CLINIC | Facility: CLINIC | Age: 78
End: 2023-05-02
Payer: COMMERCIAL

## 2023-05-02 VITALS
BODY MASS INDEX: 28.59 KG/M2 | TEMPERATURE: 97 F | DIASTOLIC BLOOD PRESSURE: 60 MMHG | SYSTOLIC BLOOD PRESSURE: 126 MMHG | RESPIRATION RATE: 12 BRPM | WEIGHT: 184.31 LBS | HEIGHT: 67.25 IN | HEART RATE: 52 BPM

## 2023-05-02 DIAGNOSIS — R39.9 LOWER URINARY TRACT SYMPTOMS (LUTS): ICD-10-CM

## 2023-05-02 DIAGNOSIS — M47.817 FACET ARTHRITIS OF LUMBOSACRAL REGION: ICD-10-CM

## 2023-05-02 DIAGNOSIS — I25.10 CORONARY ARTERY DISEASE INVOLVING NATIVE CORONARY ARTERY OF NATIVE HEART WITHOUT ANGINA PECTORIS: ICD-10-CM

## 2023-05-02 DIAGNOSIS — D69.6 THROMBOCYTOPENIA (HCC): ICD-10-CM

## 2023-05-02 DIAGNOSIS — K21.9 GASTROESOPHAGEAL REFLUX DISEASE, UNSPECIFIED WHETHER ESOPHAGITIS PRESENT: ICD-10-CM

## 2023-05-02 DIAGNOSIS — R41.3 MEMORY LOSS: ICD-10-CM

## 2023-05-02 DIAGNOSIS — E78.00 PURE HYPERCHOLESTEROLEMIA: ICD-10-CM

## 2023-05-02 DIAGNOSIS — Z00.00 ENCOUNTER FOR ANNUAL HEALTH EXAMINATION: Primary | ICD-10-CM

## 2023-05-02 DIAGNOSIS — E03.9 ACQUIRED HYPOTHYROIDISM: ICD-10-CM

## 2023-05-02 DIAGNOSIS — I10 ESSENTIAL HYPERTENSION: ICD-10-CM

## 2023-05-02 DIAGNOSIS — M79.672 LEFT FOOT PAIN: ICD-10-CM

## 2023-05-02 PROCEDURE — G0439 PPPS, SUBSEQ VISIT: HCPCS | Performed by: INTERNAL MEDICINE

## 2023-05-02 PROCEDURE — 99214 OFFICE O/P EST MOD 30 MIN: CPT | Performed by: INTERNAL MEDICINE

## 2023-05-02 PROCEDURE — 3008F BODY MASS INDEX DOCD: CPT | Performed by: INTERNAL MEDICINE

## 2023-05-02 PROCEDURE — 3078F DIAST BP <80 MM HG: CPT | Performed by: INTERNAL MEDICINE

## 2023-05-02 PROCEDURE — 3074F SYST BP LT 130 MM HG: CPT | Performed by: INTERNAL MEDICINE

## 2023-05-02 PROCEDURE — 96160 PT-FOCUSED HLTH RISK ASSMT: CPT | Performed by: INTERNAL MEDICINE

## 2023-05-02 PROCEDURE — 1170F FXNL STATUS ASSESSED: CPT | Performed by: INTERNAL MEDICINE

## 2023-05-02 PROCEDURE — 1159F MED LIST DOCD IN RCRD: CPT | Performed by: INTERNAL MEDICINE

## 2023-05-02 PROCEDURE — 1125F AMNT PAIN NOTED PAIN PRSNT: CPT | Performed by: INTERNAL MEDICINE

## 2023-05-02 NOTE — PATIENT INSTRUCTIONS
[facility]'s SCREENING SCHEDULE   Tests on this list are recommended by your physician but may not be covered, or covered at this frequency, by your insurer. Please check with your insurance carrier before scheduling to verify coverage. PREVENTATIVE SERVICES FREQUENCY &  COVERAGE DETAILS LAST COMPLETION DATE   Diabetes Screening    Fasting Blood Sugar / Glucose    One screening every 12 months if never tested or if previously tested but not diagnosed with pre-diabetes   One screening every 6 months if diagnosed with pre-diabetes Lab Results   Component Value Date     (H) 04/10/2023        Cardiovascular Disease Screening    Lipid Panel  Cholesterol  Lipoprotein (HDL)  Triglycerides Covered every 5 years for all Medicare beneficiaries without apparent signs or symptoms of cardiovascular disease Lab Results   Component Value Date    CHOLEST 122 04/10/2023    HDL 76 (H) 04/10/2023    LDL 33 04/10/2023    TRIG 58 04/10/2023         Electrocardiogram (EKG)   Covered if needed at Welcome to Medicare, and non-screening if indicated for medical reasons 03/04/2022      Ultrasound Screening for Abdominal Aortic Aneurysm (AAA) Covered once in a lifetime for one of the following risk factors    Men who are 73-68 years old and have ever smoked    Anyone with a family history -     Colorectal Cancer Screening  Covered for ages 52-80; only need ONE of the following:    Colonoscopy   Covered every 10 years    Covered every 2 years if patient is at high risk or previous colonoscopy was abnormal 06/14/2018    No recommendations at this time    Flexible Sigmoidoscopy   Covered every 4 years -    Fecal Occult Blood Test Covered annually -   Prostate Cancer Screening    Prostate-Specific Antigen (PSA) Annually Lab Results   Component Value Date    PSA 1.92 02/15/2021     There are no preventive care reminders to display for this patient.    Immunizations    Influenza Covered once per flu season  Please get every year 10/14/2022  No recommendations at this time    Pneumococcal Each vaccine (Eppuihu82 & Cdcpqwhgl34) covered once after 65 Prevnar 13: 05/12/2016    Tncpfvndo51: 10/05/2011     No recommendations at this time    Hepatitis B One screening covered for patients with certain risk factors   -  No recommendations at this time    Tetanus Toxoid Not covered by Medicare Part B unless medically necessary (cut with metal); may be covered with your pharmacy prescription benefits -    Tetanus, Diptheria and Pertusis TD and TDaP Not covered by Medicare Part B -  No recommendations at this time    Zoster Not covered by Medicare Part B; may be covered with your pharmacy  prescription benefits 07/05/2013  Zoster Vaccines(2 of 3) due on 08/30/2013       Annual Monitoring of Persistent Medications (ACE/ARB, digoxin diuretics, anticonvulsants)    Potassium Annually Lab Results   Component Value Date    K 4.0 04/10/2023         Creatinine   Annually Lab Results   Component Value Date    CREATSERUM 0.86 04/10/2023         BUN Annually Lab Results   Component Value Date    BUN 14 04/10/2023       Drug Serum Conc Annually No results found for: DIGOXIN, DIG, VALP         YOUR BLOOD PRESSURE MEDICATIONS INCLUDING METOPROLOL ARE MANAGED BY THE CARDIOLOGIST. PLEASE CALL THEM FOR REFILL WHEN NEEDED. YOU ARE DUE FOR A TETANUS VACCINE TDAP AND THE SHINGRIX VACCINATION. PLEASE GET THESE AT YOUR LOCAL PHARMACY.

## 2023-05-08 ENCOUNTER — OFFICE VISIT (OUTPATIENT)
Dept: PHYSICAL THERAPY | Facility: HOSPITAL | Age: 78
End: 2023-05-08
Attending: ORTHOPAEDIC SURGERY
Payer: MEDICARE

## 2023-05-08 PROCEDURE — 97110 THERAPEUTIC EXERCISES: CPT

## 2023-05-08 PROCEDURE — 97140 MANUAL THERAPY 1/> REGIONS: CPT

## 2023-05-11 DIAGNOSIS — E03.9 ACQUIRED HYPOTHYROIDISM: ICD-10-CM

## 2023-05-11 RX ORDER — LEVOTHYROXINE SODIUM 0.1 MG/1
TABLET ORAL
Qty: 30 TABLET | Refills: 1 | OUTPATIENT
Start: 2023-05-11

## 2023-05-11 RX ORDER — LEVOTHYROXINE SODIUM 0.1 MG/1
100 TABLET ORAL
Qty: 30 TABLET | Refills: 0 | Status: SHIPPED | OUTPATIENT
Start: 2023-05-11

## 2023-05-11 RX ORDER — LEVOTHYROXINE SODIUM 0.1 MG/1
TABLET ORAL
Qty: 30 TABLET | Refills: 0 | OUTPATIENT
Start: 2023-05-11

## 2023-05-11 NOTE — TELEPHONE ENCOUNTER
Pt dose adjusted 3/28 as his TSH was low, to recheck TSH around 5/28. Rx denied, pt reminded to get TSH via mychart.

## 2023-05-11 NOTE — TELEPHONE ENCOUNTER
Last OV relevant to medication: 5/2/23  Last refill date: 3/28/23 #30/refills: 1  When pt was asked to return for OV:   Upcoming appt/reason: 11/2/23  Was pt informed of any over due labs: see below  Free T4 (ng/dL)   Date Value   11/27/2018 1.0     TSH   Date Value   03/28/2023 0.167 mIU/mL (L)   12/15/2017 1.577 uIU/mL     Pt needs TSH rechecked around 5/28/23. Spoke to patient, he understands to get lab rechecked at the end of the month, he will need 30 day supply sent as he will run out prior to lab draw.

## 2023-05-11 NOTE — TELEPHONE ENCOUNTER
Is this medication prescribed by the [facility] Providers? Yes    Did the patient contact the pharmacy directly?:  Yes    Is patient out of meds or supply very low?:  Not yet    Medication Requested:  levothyroxine     Dose:  100 MCG Oral Tab    Is patient requesting a 30 or 90 day supply?:  30 or 90    Pharmacy name and phone # or location:      IF [address], THEN:  [facility]/pharmacy #[id number]- Gwenette Srinivasan, [address] [facility],[place] [address], [address] [facility]     IF 90 DAY SUPPLY, THEN:  [facility] ([address] [facility]) [facility], [address] [facility] [phone], [phone]    Is the patient due for an appointment?: No  (if so, please schedule appt)    Additional Notes:  N/A    Please advise the patient refills take up to 72 business hours.

## 2023-05-13 ENCOUNTER — LAB ENCOUNTER (OUTPATIENT)
Dept: LAB | Facility: HOSPITAL | Age: 78
End: 2023-05-13
Attending: INTERNAL MEDICINE
Payer: MEDICARE

## 2023-05-13 DIAGNOSIS — E03.9 ACQUIRED HYPOTHYROIDISM: ICD-10-CM

## 2023-05-13 DIAGNOSIS — D69.6 THROMBOCYTOPENIA (HCC): ICD-10-CM

## 2023-05-13 LAB
BASOPHILS # BLD AUTO: 0.02 X10(3) UL (ref 0–0.2)
BASOPHILS NFR BLD AUTO: 0.5 %
EOSINOPHIL # BLD AUTO: 0.07 X10(3) UL (ref 0–0.7)
EOSINOPHIL NFR BLD AUTO: 1.7 %
ERYTHROCYTE [DISTWIDTH] IN BLOOD BY AUTOMATED COUNT: 12.8 %
HCT VFR BLD AUTO: 43.2 %
HGB BLD-MCNC: 15.5 G/DL
IMM GRANULOCYTES # BLD AUTO: 0.02 X10(3) UL (ref 0–1)
IMM GRANULOCYTES NFR BLD: 0.5 %
LYMPHOCYTES # BLD AUTO: 1.31 X10(3) UL (ref 1–4)
LYMPHOCYTES NFR BLD AUTO: 31.3 %
MCH RBC QN AUTO: 33.4 PG (ref 26–34)
MCHC RBC AUTO-ENTMCNC: 35.9 G/DL (ref 31–37)
MCV RBC AUTO: 93.1 FL
MONOCYTES # BLD AUTO: 0.34 X10(3) UL (ref 0.1–1)
MONOCYTES NFR BLD AUTO: 8.1 %
NEUTROPHILS # BLD AUTO: 2.42 X10 (3) UL (ref 1.5–7.7)
NEUTROPHILS # BLD AUTO: 2.42 X10(3) UL (ref 1.5–7.7)
NEUTROPHILS NFR BLD AUTO: 57.9 %
PLATELET # BLD AUTO: 127 10(3)UL (ref 150–450)
RBC # BLD AUTO: 4.64 X10(6)UL
TSI SER-ACNC: 1.69 MIU/ML (ref 0.36–3.74)
WBC # BLD AUTO: 4.2 X10(3) UL (ref 4–11)

## 2023-05-13 PROCEDURE — 85025 COMPLETE CBC W/AUTO DIFF WBC: CPT

## 2023-05-13 PROCEDURE — 84443 ASSAY THYROID STIM HORMONE: CPT

## 2023-05-13 PROCEDURE — 36415 COLL VENOUS BLD VENIPUNCTURE: CPT

## 2023-05-15 ENCOUNTER — APPOINTMENT (OUTPATIENT)
Dept: PHYSICAL THERAPY | Facility: HOSPITAL | Age: 78
End: 2023-05-15
Attending: ORTHOPAEDIC SURGERY
Payer: MEDICARE

## 2023-05-16 ENCOUNTER — TELEPHONE (OUTPATIENT)
Dept: INTERNAL MEDICINE CLINIC | Facility: CLINIC | Age: 78
End: 2023-05-16

## 2023-05-16 DIAGNOSIS — E03.9 ACQUIRED HYPOTHYROIDISM: ICD-10-CM

## 2023-05-16 RX ORDER — LEVOTHYROXINE SODIUM 0.1 MG/1
TABLET ORAL
Qty: 30 TABLET | Refills: 1 | OUTPATIENT
Start: 2023-05-16

## 2023-05-16 NOTE — TELEPHONE ENCOUNTER
Duplicate refill request for levothyroxine 100 mcg denied.  Called pharmacy to verify Rx was sent on 5/11/23

## 2023-05-16 NOTE — TELEPHONE ENCOUNTER
Pt called requesting more levothyroxine, stating pharmacist told him there was no prescription there. Called [facility], pharmacist states rx is on file and will be filled today, increased quantity to #90/1 since pt had TSH drawn and it is in range per Dr Cory Martinez.

## 2023-05-19 ENCOUNTER — TELEPHONE (OUTPATIENT)
Dept: SURGERY | Facility: CLINIC | Age: 78
End: 2023-05-19

## 2023-05-19 NOTE — TELEPHONE ENCOUNTER
Called pt to offer him a sooner 6 month appointment on 5/26 at 11:45am.     Pt stated that he will call back to let us know either way.     OK to double book per [facility]

## 2023-05-22 ENCOUNTER — OFFICE VISIT (OUTPATIENT)
Dept: PODIATRY CLINIC | Facility: CLINIC | Age: 78
End: 2023-05-22

## 2023-05-22 VITALS — SYSTOLIC BLOOD PRESSURE: 132 MMHG | DIASTOLIC BLOOD PRESSURE: 68 MMHG

## 2023-05-22 DIAGNOSIS — M72.2 PLANTAR FASCIITIS OF LEFT FOOT: ICD-10-CM

## 2023-05-22 DIAGNOSIS — M79.672 PAIN OF LEFT HEEL: Primary | ICD-10-CM

## 2023-05-22 DIAGNOSIS — M77.32 CALCANEAL SPUR OF LEFT FOOT: ICD-10-CM

## 2023-05-22 DIAGNOSIS — M79.672 INFLAMMATORY HEEL PAIN, LEFT: ICD-10-CM

## 2023-05-22 RX ORDER — TRIAMCINOLONE ACETONIDE 40 MG/ML
40 INJECTION, SUSPENSION INTRA-ARTICULAR; INTRAMUSCULAR ONCE
Status: COMPLETED | OUTPATIENT
Start: 2023-05-22 | End: 2023-05-22

## 2023-05-22 NOTE — PROGRESS NOTES
Per Dr. Wilson Zhu to draw up 1ml of Kenalog 40 and 1ml of 0.5% Marcaine for injection to left foot.

## 2023-05-31 ENCOUNTER — HOSPITAL ENCOUNTER (OUTPATIENT)
Dept: GENERAL RADIOLOGY | Age: 78
Discharge: HOME | End: 2023-05-31
Attending: INTERNAL MEDICINE
Payer: MEDICARE

## 2023-05-31 ENCOUNTER — OFFICE VISIT (OUTPATIENT)
Dept: INTERNAL MEDICINE CLINIC | Facility: CLINIC | Age: 78
End: 2023-05-31
Payer: COMMERCIAL

## 2023-05-31 VITALS
DIASTOLIC BLOOD PRESSURE: 58 MMHG | TEMPERATURE: 98 F | SYSTOLIC BLOOD PRESSURE: 120 MMHG | HEART RATE: 54 BPM | OXYGEN SATURATION: 99 % | BODY MASS INDEX: 28.39 KG/M2 | RESPIRATION RATE: 14 BRPM | WEIGHT: 183 LBS | HEIGHT: 67.5 IN

## 2023-05-31 DIAGNOSIS — R61 NIGHT SWEATS: ICD-10-CM

## 2023-05-31 DIAGNOSIS — M54.9 UPPER BACK PAIN ON RIGHT SIDE: ICD-10-CM

## 2023-05-31 DIAGNOSIS — M54.9 UPPER BACK PAIN ON RIGHT SIDE: Primary | ICD-10-CM

## 2023-05-31 DIAGNOSIS — Z12.5 SCREENING FOR PROSTATE CANCER: ICD-10-CM

## 2023-05-31 PROCEDURE — 1170F FXNL STATUS ASSESSED: CPT | Performed by: INTERNAL MEDICINE

## 2023-05-31 PROCEDURE — 3008F BODY MASS INDEX DOCD: CPT | Performed by: INTERNAL MEDICINE

## 2023-05-31 PROCEDURE — 3078F DIAST BP <80 MM HG: CPT | Performed by: INTERNAL MEDICINE

## 2023-05-31 PROCEDURE — 1159F MED LIST DOCD IN RCRD: CPT | Performed by: INTERNAL MEDICINE

## 2023-05-31 PROCEDURE — 1125F AMNT PAIN NOTED PAIN PRSNT: CPT | Performed by: INTERNAL MEDICINE

## 2023-05-31 PROCEDURE — 99214 OFFICE O/P EST MOD 30 MIN: CPT | Performed by: INTERNAL MEDICINE

## 2023-05-31 PROCEDURE — 3074F SYST BP LT 130 MM HG: CPT | Performed by: INTERNAL MEDICINE

## 2023-05-31 PROCEDURE — 72072 X-RAY EXAM THORAC SPINE 3VWS: CPT | Performed by: INTERNAL MEDICINE

## 2023-06-01 ENCOUNTER — LAB ENCOUNTER (OUTPATIENT)
Dept: LAB | Facility: HOSPITAL | Age: 78
End: 2023-06-01
Attending: INTERNAL MEDICINE
Payer: MEDICARE

## 2023-06-01 DIAGNOSIS — I10 ESSENTIAL HYPERTENSION: ICD-10-CM

## 2023-06-01 DIAGNOSIS — Z12.5 SCREENING FOR PROSTATE CANCER: ICD-10-CM

## 2023-06-01 DIAGNOSIS — R61 NIGHT SWEATS: ICD-10-CM

## 2023-06-01 LAB
BILIRUB UR QL STRIP.AUTO: NEGATIVE
CLARITY UR REFRACT.AUTO: CLEAR
COLOR UR AUTO: YELLOW
COMPLEXED PSA SERPL-MCNC: 1.3 NG/ML (ref ?–4)
CRP SERPL-MCNC: <0.29 MG/DL (ref ?–0.3)
ERYTHROCYTE [SEDIMENTATION RATE] IN BLOOD: 1 MM/HR
GLUCOSE UR STRIP.AUTO-MCNC: NEGATIVE MG/DL
LEUKOCYTE ESTERASE UR QL STRIP.AUTO: NEGATIVE
NITRITE UR QL STRIP.AUTO: NEGATIVE
PH UR STRIP.AUTO: 5 [PH] (ref 5–8)
PROT UR STRIP.AUTO-MCNC: 30 MG/DL
RBC UR QL AUTO: NEGATIVE
SP GR UR STRIP.AUTO: 1.03 (ref 1–1.03)
UROBILINOGEN UR STRIP.AUTO-MCNC: 4 MG/DL

## 2023-06-01 PROCEDURE — 86140 C-REACTIVE PROTEIN: CPT

## 2023-06-01 PROCEDURE — 86480 TB TEST CELL IMMUN MEASURE: CPT

## 2023-06-01 PROCEDURE — 36415 COLL VENOUS BLD VENIPUNCTURE: CPT

## 2023-06-01 PROCEDURE — 81001 URINALYSIS AUTO W/SCOPE: CPT

## 2023-06-01 PROCEDURE — 80053 COMPREHEN METABOLIC PANEL: CPT

## 2023-06-01 PROCEDURE — 85652 RBC SED RATE AUTOMATED: CPT

## 2023-06-01 PROCEDURE — 87040 BLOOD CULTURE FOR BACTERIA: CPT

## 2023-06-01 PROCEDURE — 87389 HIV-1 AG W/HIV-1&-2 AB AG IA: CPT

## 2023-06-02 ENCOUNTER — TELEPHONE (OUTPATIENT)
Dept: INTERNAL MEDICINE CLINIC | Facility: CLINIC | Age: 78
End: 2023-06-02

## 2023-06-02 ENCOUNTER — TELEPHONE (OUTPATIENT)
Dept: PHYSICAL THERAPY | Facility: HOSPITAL | Age: 78
End: 2023-06-02

## 2023-06-02 LAB
ALBUMIN SERPL-MCNC: 4 G/DL (ref 3.4–5)
ALBUMIN/GLOB SERPL: 1.5 {RATIO} (ref 1–2)
ALP LIVER SERPL-CCNC: 56 U/L
ALT SERPL-CCNC: 40 U/L
ANION GAP SERPL CALC-SCNC: 6 MMOL/L (ref 0–18)
AST SERPL-CCNC: 20 U/L (ref 15–37)
BILIRUB SERPL-MCNC: 1.4 MG/DL (ref 0.1–2)
BUN BLD-MCNC: 21 MG/DL (ref 7–18)
CALCIUM BLD-MCNC: 9.1 MG/DL (ref 8.5–10.1)
CHLORIDE SERPL-SCNC: 109 MMOL/L (ref 98–112)
CO2 SERPL-SCNC: 25 MMOL/L (ref 21–32)
CREAT BLD-MCNC: 0.92 MG/DL
GFR SERPLBLD BASED ON 1.73 SQ M-ARVRAT: 85 ML/MIN/1.73M2 (ref 60–?)
GLOBULIN PLAS-MCNC: 2.6 G/DL (ref 2.8–4.4)
GLUCOSE BLD-MCNC: 89 MG/DL (ref 70–99)
OSMOLALITY SERPL CALC.SUM OF ELEC: 292 MOSM/KG (ref 275–295)
POTASSIUM SERPL-SCNC: 4 MMOL/L (ref 3.5–5.1)
PROT SERPL-MCNC: 6.6 G/DL (ref 6.4–8.2)
SODIUM SERPL-SCNC: 140 MMOL/L (ref 136–145)

## 2023-06-02 NOTE — TELEPHONE ENCOUNTER
Would recommend he do the PT for this first. Use heating pads to help. Can also rub some muscle rub like bengay. Can try tylenol arthritis for pain.

## 2023-06-02 NOTE — TELEPHONE ENCOUNTER
Pt would like to know what else he can take for his shoulder pain.  Currently taking advil and its not helping

## 2023-06-02 NOTE — TELEPHONE ENCOUNTER
Pt informed of provider's advice: start PT,  heating pad over clothing 15-20 min, 3-4x/day, topical pain cream like bengay, try tylenol arthritis for pain according to package directions. Pt verbalized understanding. Pt said he can be called with lab results on Monday, he will be away from home this afternoon.

## 2023-06-05 ENCOUNTER — OFFICE VISIT (OUTPATIENT)
Dept: PHYSICAL THERAPY | Facility: HOSPITAL | Age: 78
End: 2023-06-05
Attending: INTERNAL MEDICINE
Payer: MEDICARE

## 2023-06-05 DIAGNOSIS — M54.9 UPPER BACK PAIN ON RIGHT SIDE: ICD-10-CM

## 2023-06-05 LAB
M TB IFN-G CD4+ T-CELLS BLD-ACNC: 0.03 IU/ML
M TB TUBERC IFN-G BLD QL: NEGATIVE
M TB TUBERC IGNF/MITOGEN IGNF CONTROL: >10 IU/ML
QFT TB1 AG MINUS NIL: 0 IU/ML
QFT TB2 AG MINUS NIL: 0 IU/ML

## 2023-06-05 PROCEDURE — 97140 MANUAL THERAPY 1/> REGIONS: CPT

## 2023-06-05 PROCEDURE — 97162 PT EVAL MOD COMPLEX 30 MIN: CPT

## 2023-06-09 ENCOUNTER — OFFICE VISIT (OUTPATIENT)
Dept: PHYSICAL THERAPY | Facility: HOSPITAL | Age: 78
End: 2023-06-09
Attending: INTERNAL MEDICINE
Payer: MEDICARE

## 2023-06-09 PROCEDURE — 97110 THERAPEUTIC EXERCISES: CPT

## 2023-06-09 PROCEDURE — 97140 MANUAL THERAPY 1/> REGIONS: CPT

## 2023-06-09 NOTE — PROGRESS NOTES
Diagnosis:   Upper back pain on right side (M54.9)      Referring Provider: Bandar Davila  Date of Evaluation: 6/5/2023    Precautions:  None Next MD visit:   none scheduled  Date of Surgery: n/a   Insurance Primary/Secondary: Daniel Lizt / N/A     # Auth Visits: 8 POC           Subjective: The patient reports that he has really good moments and he has really bad moments of symptoms. In general he feels good all the time except occasionally. It was really hurting when he was sitting in the lobby. Pain is more frequent in the arm. Feels like a dull sensation in the shoulder. The length of time that the pain occurs is shorter than it was before. Pt has not gone back to exercising yet. Pain: 6/10 when it occurs      Objective:     Cervical AROM: (* denotes performed with pain)  Flexion: 50  Extension: 40  Sidebending: R impaired* (scapular pain); L WNL  Rotation: R 64 (was 55* (scapular pain); L 60 (was 50)      Assessment: The patient had improved range of motion into cervical rotation today. He did not have any reproduction of scapular pain with cervical spine mobilizations, which was an improvement from the previous session. Taught the patient how to perform self soft tissue mobilization with the tennis ball on the wall. Goals: PROGRESSING  (to be met in 8 visits)   1. The patient will demonstrate at least 65 degrees of cervical spine rotation AROM bilaterally  2. The patient will report being able to use the computer for >1 hour without limitation due to neck pain. 3. The patient will report centralization of his scapular pain  4. The patient will report being able to walk >1/4 mile without limitation due to scapular pain  5. The patient will be independent and adherent in a comprehensive HEP    Plan: continue mobilizations to lower cervical spine  Date: 6/9/2023  TX#: 2/8 Date:                 TX#: 3/ Date:                 TX#: 4/ Date:                 TX#: 5/ Date:    Tx#: 6/   Manual therapy  PPIVM rotation and lateral flexion grade II-III  Manual traction x6 min  STM along scapula, medial scapular border, thoracic paraspinals on R  x25 min       Therapeutic exercise  Open book thoracic rotation 10x ea  Taught self STM to medial scapular border with tennis ball  Low row green TB 3x8  Towel slide on door 10x       X       X       HEP:   Access Code: [email or web address]  URL: [email or web address]  Date: 06/06/2023  Prepared by: Héctor Harding    Exercises  - Seated Cervical Retraction  - 5 x daily - 7 x weekly - 1 sets - 5 reps  - Seated Scapular Retraction  - 5 x daily - 7 x weekly - 1 sets - 5 reps    Charges: manual therapy x2, therapeutic exercise x1       Total Timed Treatment: 45 min  Total Treatment Time: 45 min

## 2023-06-12 ENCOUNTER — LAB ENCOUNTER (OUTPATIENT)
Dept: LAB | Facility: HOSPITAL | Age: 78
End: 2023-06-12
Attending: NURSE PRACTITIONER
Payer: MEDICARE

## 2023-06-12 ENCOUNTER — TELEMEDICINE (OUTPATIENT)
Dept: INTERNAL MEDICINE CLINIC | Facility: CLINIC | Age: 78
End: 2023-06-12
Payer: COMMERCIAL

## 2023-06-12 DIAGNOSIS — B34.9 VIRAL SYNDROME: ICD-10-CM

## 2023-06-12 DIAGNOSIS — B34.9 VIRAL SYNDROME: Primary | ICD-10-CM

## 2023-06-12 PROBLEM — R94.39 ABNORMAL CARDIOVASCULAR STRESS TEST: Status: ACTIVE | Noted: 2022-10-12

## 2023-06-12 PROCEDURE — 87635 SARS-COV-2 COVID-19 AMP PRB: CPT

## 2023-06-12 RX ORDER — BENZONATATE 200 MG/1
200 CAPSULE ORAL 3 TIMES DAILY PRN
Qty: 21 CAPSULE | Refills: 0 | Status: SHIPPED | OUTPATIENT
Start: 2023-06-12

## 2023-06-13 ENCOUNTER — TELEPHONE (OUTPATIENT)
Dept: INTERNAL MEDICINE CLINIC | Facility: CLINIC | Age: 78
End: 2023-06-13

## 2023-06-13 NOTE — TELEPHONE ENCOUNTER
Pt is asking for alternative to Benzonatate prescribed at [place] [id number] yesterday  which is not covered by his insurance.

## 2023-06-13 NOTE — TELEPHONE ENCOUNTER
Pt informed of  provider's advice. Pt verbalizes understanding. He will take Over-the-counter Robitussin-DM or Mucinex DM . He doesn't like the night time cough medicine because it makes him sleepy. Pt will update Kaitlyn if OTC cough medicine is not effective. Covid pcr pending.

## 2023-06-13 NOTE — TELEPHONE ENCOUNTER
Kaitlyn prescribed benzonatate 200 MG Oral Cap for patient yesterday, 6/13/23. The medication is not covered by insurance. Is there an alternative that can be sent to [address] [place] [address], [address]? Please advise. Thank you!

## 2023-06-13 NOTE — TELEPHONE ENCOUNTER
Over-the-counter Robitussin-DM or Mucinex DM during the day, the nighttime medication that he had over-the-counter for night. If that is still not effective we can try cheratussin as needed.   thank you

## 2023-06-14 ENCOUNTER — APPOINTMENT (OUTPATIENT)
Dept: PHYSICAL THERAPY | Facility: HOSPITAL | Age: 78
End: 2023-06-14
Attending: INTERNAL MEDICINE
Payer: MEDICARE

## 2023-06-14 ENCOUNTER — TELEPHONE (OUTPATIENT)
Dept: INTERNAL MEDICINE CLINIC | Facility: CLINIC | Age: 78
End: 2023-06-14

## 2023-06-14 LAB — SARS-COV-2 RNA RESP QL NAA+PROBE: DETECTED

## 2023-06-21 RX ORDER — OMEPRAZOLE 20 MG/1
CAPSULE, DELAYED RELEASE ORAL
Qty: 90 CAPSULE | Refills: 1 | Status: SHIPPED | OUTPATIENT
Start: 2023-06-21 | End: 2023-06-24

## 2023-06-21 NOTE — TELEPHONE ENCOUNTER
Last OV relevant to medication: 5/31/2023  Last refill date: 2/15/23     #/refills:90/0  When pt was asked to return for OV:  Upcoming appt/reason:follow up: 11/7/23 Med Check  Was pt informed of any over due labs:none

## 2023-06-23 ENCOUNTER — TELEPHONE (OUTPATIENT)
Dept: INTERNAL MEDICINE CLINIC | Facility: CLINIC | Age: 78
End: 2023-06-23

## 2023-06-23 NOTE — TELEPHONE ENCOUNTER
Pt cancelled 6/19/23 appointment with Dr Speedy Hughes due to Covid. Covid symptoms are resolving, but he would like an appointment for other  Symptoms. Shaista Montano He is concerned about night sweats x 1 year, pain in right shoulder blade x 1 month.  Pt accepted appt for OV   Future Appointments   Date Time Provider Wei Sanchezi   6/30/2023  1:40 PM LITA Sanchez [facility]   7/5/2023  4:15 PM Paralee Poet, PT [facility] PHYS [facility] AT [facility]   7/10/2023 10:15 AM Paralee Poet, PT [facility] PHYS [facility] AT [facility]   7/25/2023 10:15 AM Paralee Poet, PT [facility] PHYS [facility] SPECIALTY [facility] AT [facility] AT [facility]   7/31/2023 10:15 AM Paralee Poet, PT [facility] PHYS [address]   11/7/2023  8:00 AM Kristian Lowery MD [facility] [place] [address]

## 2023-06-24 DIAGNOSIS — E03.9 ACQUIRED HYPOTHYROIDISM: ICD-10-CM

## 2023-06-24 DIAGNOSIS — K21.9 GASTROESOPHAGEAL REFLUX DISEASE, UNSPECIFIED WHETHER ESOPHAGITIS PRESENT: Primary | ICD-10-CM

## 2023-06-24 DIAGNOSIS — N40.0 BENIGN PROSTATIC HYPERPLASIA, UNSPECIFIED WHETHER LOWER URINARY TRACT SYMPTOMS PRESENT: ICD-10-CM

## 2023-06-26 RX ORDER — LEVOTHYROXINE SODIUM 0.1 MG/1
100 TABLET ORAL
Qty: 90 TABLET | Refills: 2 | Status: SHIPPED | OUTPATIENT
Start: 2023-06-26

## 2023-06-26 RX ORDER — OMEPRAZOLE 20 MG/1
20 CAPSULE, DELAYED RELEASE ORAL DAILY
Qty: 90 CAPSULE | Refills: 1 | Status: SHIPPED | OUTPATIENT
Start: 2023-06-26

## 2023-06-26 RX ORDER — FINASTERIDE 5 MG/1
5 TABLET, FILM COATED ORAL DAILY
Qty: 90 TABLET | Refills: 6 | Status: SHIPPED | OUTPATIENT
Start: 2023-06-26

## 2023-06-27 ENCOUNTER — APPOINTMENT (OUTPATIENT)
Dept: PHYSICAL THERAPY | Facility: HOSPITAL | Age: 78
End: 2023-06-27
Attending: INTERNAL MEDICINE
Payer: MEDICARE

## 2023-06-30 ENCOUNTER — APPOINTMENT (OUTPATIENT)
Dept: PHYSICAL THERAPY | Facility: HOSPITAL | Age: 78
End: 2023-06-30
Attending: INTERNAL MEDICINE
Payer: MEDICARE

## 2023-06-30 ENCOUNTER — OFFICE VISIT (OUTPATIENT)
Dept: INTERNAL MEDICINE CLINIC | Facility: CLINIC | Age: 78
End: 2023-06-30
Payer: COMMERCIAL

## 2023-06-30 VITALS
WEIGHT: 181 LBS | OXYGEN SATURATION: 98 % | HEART RATE: 62 BPM | DIASTOLIC BLOOD PRESSURE: 66 MMHG | BODY MASS INDEX: 28.41 KG/M2 | HEIGHT: 67 IN | SYSTOLIC BLOOD PRESSURE: 124 MMHG | TEMPERATURE: 99 F | RESPIRATION RATE: 20 BRPM

## 2023-06-30 DIAGNOSIS — L98.9 SKIN LESION: ICD-10-CM

## 2023-06-30 DIAGNOSIS — R61 NIGHT SWEATS: Primary | ICD-10-CM

## 2023-06-30 DIAGNOSIS — R05.8 PRODUCTIVE COUGH: ICD-10-CM

## 2023-06-30 PROCEDURE — 1170F FXNL STATUS ASSESSED: CPT | Performed by: NURSE PRACTITIONER

## 2023-06-30 PROCEDURE — 3008F BODY MASS INDEX DOCD: CPT | Performed by: NURSE PRACTITIONER

## 2023-06-30 PROCEDURE — 1160F RVW MEDS BY RX/DR IN RCRD: CPT | Performed by: NURSE PRACTITIONER

## 2023-06-30 PROCEDURE — 3078F DIAST BP <80 MM HG: CPT | Performed by: NURSE PRACTITIONER

## 2023-06-30 PROCEDURE — 99214 OFFICE O/P EST MOD 30 MIN: CPT | Performed by: NURSE PRACTITIONER

## 2023-06-30 PROCEDURE — 1159F MED LIST DOCD IN RCRD: CPT | Performed by: NURSE PRACTITIONER

## 2023-06-30 PROCEDURE — 3074F SYST BP LT 130 MM HG: CPT | Performed by: NURSE PRACTITIONER

## 2023-06-30 RX ORDER — AZITHROMYCIN 250 MG/1
TABLET, FILM COATED ORAL
Qty: 6 TABLET | Refills: 0 | Status: SHIPPED | OUTPATIENT
Start: 2023-06-30 | End: 2023-07-05

## 2023-07-03 DIAGNOSIS — N40.0 BENIGN PROSTATIC HYPERPLASIA, UNSPECIFIED WHETHER LOWER URINARY TRACT SYMPTOMS PRESENT: ICD-10-CM

## 2023-07-05 ENCOUNTER — APPOINTMENT (OUTPATIENT)
Dept: PHYSICAL THERAPY | Facility: HOSPITAL | Age: 78
End: 2023-07-05
Attending: INTERNAL MEDICINE
Payer: MEDICARE

## 2023-07-05 RX ORDER — TAMSULOSIN HYDROCHLORIDE 0.4 MG/1
CAPSULE ORAL
Qty: 180 CAPSULE | Refills: 3 | Status: SHIPPED | OUTPATIENT
Start: 2023-07-05 | End: 2023-10-03

## 2023-07-10 ENCOUNTER — TELEPHONE (OUTPATIENT)
Dept: PHYSICAL THERAPY | Facility: HOSPITAL | Age: 78
End: 2023-07-10

## 2023-07-10 ENCOUNTER — APPOINTMENT (OUTPATIENT)
Dept: PHYSICAL THERAPY | Facility: HOSPITAL | Age: 78
End: 2023-07-10
Attending: INTERNAL MEDICINE
Payer: MEDICARE

## 2023-07-25 ENCOUNTER — HOSPITAL ENCOUNTER (OUTPATIENT)
Dept: CT IMAGING | Facility: HOSPITAL | Age: 78
Discharge: HOME | End: 2023-07-25
Attending: NURSE PRACTITIONER
Payer: MEDICARE

## 2023-07-25 ENCOUNTER — APPOINTMENT (OUTPATIENT)
Dept: PHYSICAL THERAPY | Facility: HOSPITAL | Age: 78
End: 2023-07-25
Attending: INTERNAL MEDICINE
Payer: MEDICARE

## 2023-07-25 DIAGNOSIS — R61 NIGHT SWEATS: ICD-10-CM

## 2023-07-25 LAB
CREAT BLD-MCNC: 1 MG/DL
EGFRCR SERPLBLD CKD-EPI 2021: 77 ML/MIN/1.73M2 (ref 60–?)

## 2023-07-25 PROCEDURE — 82565 ASSAY OF CREATININE: CPT

## 2023-07-25 PROCEDURE — 74177 CT ABD & PELVIS W/CONTRAST: CPT | Performed by: NURSE PRACTITIONER

## 2023-07-25 PROCEDURE — 71260 CT THORAX DX C+: CPT | Performed by: NURSE PRACTITIONER

## 2023-07-28 ENCOUNTER — TELEPHONE (OUTPATIENT)
Dept: INTERNAL MEDICINE CLINIC | Facility: CLINIC | Age: 78
End: 2023-07-28

## 2023-07-28 DIAGNOSIS — R05.8 PRODUCTIVE COUGH: ICD-10-CM

## 2023-07-28 DIAGNOSIS — R61 NIGHT SWEATS: Primary | ICD-10-CM

## 2023-07-31 ENCOUNTER — APPOINTMENT (OUTPATIENT)
Dept: PHYSICAL THERAPY | Facility: HOSPITAL | Age: 78
End: 2023-07-31
Attending: INTERNAL MEDICINE
Payer: MEDICARE

## 2023-08-06 DIAGNOSIS — E03.9 ACQUIRED HYPOTHYROIDISM: ICD-10-CM

## 2023-08-07 RX ORDER — LEVOTHYROXINE SODIUM 0.1 MG/1
100 TABLET ORAL
Qty: 90 TABLET | Refills: 0 | Status: SHIPPED | OUTPATIENT
Start: 2023-08-07

## 2023-08-07 NOTE — TELEPHONE ENCOUNTER
Thyroid Supplements Protocol [id number]/06/2023 08:30 AM   Protocol Details TSH test in past 12 months    TSH value between 0.350 and 5.500 IU/ml    Appointment in past 12 or next 3 months      From 5/13/23 result note-Your thyroid test is now normal. Continue current dose of levothyroxine. Future Appointments   Date Time Provider Wei Scott   8/14/2023  5:45 PM [facility] CARD ECHO RM 1 [facility] AT [facility]   11/7/2023  8:00 AM Karen Tang MD [facility] [place]   Refilled per protocol.

## 2023-08-14 ENCOUNTER — HOSPITAL ENCOUNTER (OUTPATIENT)
Dept: CV DIAGNOSTICS | Facility: HOSPITAL | Age: 78
Discharge: HOME | End: 2023-08-14
Attending: NURSE PRACTITIONER
Payer: MEDICARE

## 2023-08-14 DIAGNOSIS — R61 NIGHT SWEATS: ICD-10-CM

## 2023-08-14 PROCEDURE — 93306 TTE W/DOPPLER COMPLETE: CPT | Performed by: NURSE PRACTITIONER

## 2023-09-23 DIAGNOSIS — N40.0 BENIGN PROSTATIC HYPERPLASIA, UNSPECIFIED WHETHER LOWER URINARY TRACT SYMPTOMS PRESENT: ICD-10-CM

## 2023-09-26 RX ORDER — FINASTERIDE 5 MG/1
5 TABLET, FILM COATED ORAL DAILY
Qty: 90 TABLET | Refills: 6 | Status: SHIPPED | OUTPATIENT
Start: 2023-09-26

## 2023-09-27 ENCOUNTER — OFFICE VISIT (OUTPATIENT)
Dept: PODIATRY CLINIC | Facility: CLINIC | Age: 78
End: 2023-09-27

## 2023-09-27 DIAGNOSIS — M79.672 ARCH PAIN, LEFT: ICD-10-CM

## 2023-09-27 DIAGNOSIS — M72.2 PLANTAR FASCIITIS OF LEFT FOOT: ICD-10-CM

## 2023-09-27 DIAGNOSIS — M79.672 PAIN OF LEFT HEEL: Primary | ICD-10-CM

## 2023-09-27 PROCEDURE — 1159F MED LIST DOCD IN RCRD: CPT | Performed by: PODIATRIST

## 2023-09-27 PROCEDURE — 99213 OFFICE O/P EST LOW 20 MIN: CPT | Performed by: PODIATRIST

## 2023-09-28 DIAGNOSIS — N40.0 BENIGN PROSTATIC HYPERPLASIA, UNSPECIFIED WHETHER LOWER URINARY TRACT SYMPTOMS PRESENT: ICD-10-CM

## 2023-09-29 NOTE — PROGRESS NOTES
Lizzie Costello is a 66year old male. Patient presents with: Follow - Up: L heel -  Pt received cortisone injection on 05/22. Pt states injection did help with pain. Pt denies any pain. HPI:   Patient returns to clinic states the injection helped quite a bit with his heel is feeling better but now is getting pain into his arch as well. At today's visit reviewed nurse's history as taken above, allergies medications and medical history as documented below. All changes duly noted  Allergies: Penicillins   Current Outpatient Medications   Medication Sig Dispense Refill    finasteride 5 MG Oral Tab Take 1 tablet (5 mg total) by mouth daily. 90 tablet 6    levothyroxine 100 MCG Oral Tab TAKE 1 TABLET BY MOUTH BEFORE BREAKFAST. 90 tablet 0    TAMSULOSIN 0.4 MG Oral Cap TAKE 1 CAPSULE BY MOUTH IN THE  MORNING AND 1 CAPSULE BY MOUTH  BEFORE BEDTIME 180 capsule 3    omeprazole 20 MG Oral Capsule Delayed Release Take 1 capsule (20 mg total) by mouth daily. 90 capsule 1    benzonatate 200 MG Oral Cap Take 1 capsule (200 mg total) by mouth 3 (three) times daily as needed for cough. 21 capsule 0    Simethicone (GAS-X OR) daily as needed. Sildenafil Citrate (VIAGRA) 100 MG Oral Tab Take 1 tablet (100 mg total) by mouth daily as needed for Erectile Dysfunction. 30 tablet 0    atorvastatin 80 MG Oral Tab Take 1 tablet (80 mg total) by mouth daily. 90 tablet 3    Losartan Potassium 25 MG Oral Tab Take 1 tablet (25 mg total) by mouth nightly. 90 tablet 1    ezetimibe 10 MG Oral Tab Take 1 tablet (10 mg total) by mouth daily. 90 tablet 1    Betamethasone Dipropionate 0.05 % External Lotion APPLY TO AFFECTED AREA ONCE DAILY AFTER A SHOWER AS NEEDED AVOID FACE, UNDERARMS, AND GROIN      Metoprolol Succinate ER 25 MG Oral Tablet 24 Hr Take 0.5 tablets (12.5 mg total) by mouth Daily Beta Blocker. 45 tablet 3    POTASSIUM OR Take by mouth as needed.         aspirin 81 MG Oral Tab Take 1 tablet (81 mg total) by mouth nightly. Multiple Vitamins-Minerals (CENTRUM CARDIO OR) Take  by mouth. Past Medical History:   Diagnosis Date    Actinic keratoses 10/29/2019    [facility]    Allergic rhinitis     Anxiety 2019    Arthritis 2020    left thumb    Atherosclerosis of coronary artery 2005    triple-bypass    Depression 2019    Heart attack ([address]     Heart attack ([address] 2005    Hyperthyroidism     Hypothyroidism     Lentigines 10/29/2019    [facility]    Lower urinary tract symptoms (LUTS) 2018    Multiple benign nevi 10/29/2019    [facility]    Other follicular cysts of the skin and subcutaneous tissue 10/29/2019    [facility] - Cyst - will monitor lesion on trunk. Thrombocytopenia ([address] 2018    Thyroid disease     Visual impairment     glasses      Past Surgical History:   Procedure Laterality Date    ANGIOPLASTY (CORONARY)  2015    stents x 2    CABG  2005    COLONOSCOPY  2019    OTHER  2009    prostate surgey    OTHER  2016    left rotator cuff    OTHER SURGICAL HISTORY  2017    torn rotator cuff    SKIN SURGERY      TONSILLECTOMY      VASECTOMY        Family History   Problem Relation Age of Onset    Heart Attack Other 80      Social History    Socioeconomic History      Marital status:     Tobacco Use      Smoking status: Former        Packs/day: 0.50        Years: 20.00        Additional pack years: 0.00        Total pack years: 10.00        Types: Cigarettes        Quit date: 3/18/2005        Years since quittin.5      Smokeless tobacco: Never    Vaping Use      Vaping Use: Never used    Substance and Sexual Activity      Alcohol use:  Yes        Alcohol/week: 1.0 standard drink of alcohol        Types: 1 Standard drinks or equivalent per week        Comment: Wine daily 2oz      Drug use: No    Other Topics      Concerns:        Caffeine Concern: No        Exercise: Yes          3 days a week- cardio and weights        Seat Belt: Yes Special Diet: No        Stress Concern: Yes          personal        Weight Concern: No          REVIEW OF SYSTEMS:   Today reviewed systens as documented below  GENERAL HEALTH: feels well otherwise  SKIN: Refer to exam below  RESPIRATORY: denies shortness of breath with exertion  CARDIOVASCULAR: denies chest pain on exertion  GI: denies abdominal pain and denies heartburn  NEURO: denies headaches    EXAM:   There were no vitals taken for this visit. GENERAL: well developed, well nourished, in no apparent distress  EXTREMITIES:   1. Integument: The skin on the patient's left foot and arch is intact there is no problems   2. Vascular: Patient still has palpable pulses there is no changes here   3. Neurologic: Patient has intact sensorium there is no deficits   4. Musculoskeletal: Patient has good muscle strength strength but now has a little bit of pain in his arch as opposed to his heel which is better. ASSESSMENT AND PLAN:   Diagnoses and all orders for this visit:    Pain of left heel  -     OP REFERRAL TO [facility]    Plantar fasciitis of left foot  -     OP REFERRAL TO [facility]    Arch pain, left  -     OP REFERRAL TO [facility]        Plan: To get rid of this the hallway I recommended physical therapy at this time 2 times weekly for 3 weeks see him again in about 4 to 5 weeks we will see how he is doing the meantime he can get an over-the-counter orthotic wear that and continue with home stretching until he can get into physical therapy. The patient indicates understanding of these issues and agrees to the plan.     Barb Cline DPM

## 2023-10-03 RX ORDER — FINASTERIDE 5 MG/1
5 TABLET, FILM COATED ORAL DAILY
Qty: 90 TABLET | Refills: 6 | Status: SHIPPED | OUTPATIENT
Start: 2023-10-03

## 2023-10-06 DIAGNOSIS — K21.9 GASTROESOPHAGEAL REFLUX DISEASE, UNSPECIFIED WHETHER ESOPHAGITIS PRESENT: ICD-10-CM

## 2023-10-09 RX ORDER — OMEPRAZOLE 20 MG/1
20 CAPSULE, DELAYED RELEASE ORAL DAILY
Qty: 90 CAPSULE | Refills: 0 | Status: SHIPPED | OUTPATIENT
Start: 2023-10-09

## 2023-10-09 NOTE — TELEPHONE ENCOUNTER
Last OV relevant to medication: 6/30/23  Last refill date: 6/26/23 #/refills: 90+1  When pt was asked to return for OV: 11/7/23  Upcoming appt/reason: med ck 11/7/23  Was pt informed of any over due labs: none

## 2023-11-18 DIAGNOSIS — E03.9 ACQUIRED HYPOTHYROIDISM: ICD-10-CM

## 2023-11-21 RX ORDER — LEVOTHYROXINE SODIUM 0.1 MG/1
100 TABLET ORAL
Qty: 90 TABLET | Refills: 0 | Status: SHIPPED | OUTPATIENT
Start: 2023-11-21 | End: 2023-12-05

## 2023-11-21 NOTE — TELEPHONE ENCOUNTER
Thyroid Supplements Protocol [id number]/18/2023 10:03 AM   Protocol Details TSH test in past 12 months    TSH value between 0.350 and 5.500 IU/ml    Appointment in past 12 or next 3 months   From results 5/13/23:Your thyroid test is now normal. Continue current dose of levothyroxine   No future appointments.    Refill sent. Pt due for med check, please call to schedule. Thanks!

## 2023-11-30 ENCOUNTER — OFFICE VISIT (OUTPATIENT)
Dept: INTERNAL MEDICINE CLINIC | Facility: CLINIC | Age: 78
End: 2023-11-30
Payer: COMMERCIAL

## 2023-11-30 ENCOUNTER — LAB ENCOUNTER (OUTPATIENT)
Dept: LAB | Age: 78
End: 2023-11-30
Attending: INTERNAL MEDICINE
Payer: MEDICARE

## 2023-11-30 ENCOUNTER — EKG ENCOUNTER (OUTPATIENT)
Dept: LAB | Age: 78
End: 2023-11-30
Attending: INTERNAL MEDICINE
Payer: MEDICARE

## 2023-11-30 VITALS
SYSTOLIC BLOOD PRESSURE: 128 MMHG | HEART RATE: 64 BPM | TEMPERATURE: 97 F | DIASTOLIC BLOOD PRESSURE: 64 MMHG | OXYGEN SATURATION: 95 % | RESPIRATION RATE: 14 BRPM | HEIGHT: 67 IN | WEIGHT: 182.38 LBS | BODY MASS INDEX: 28.63 KG/M2

## 2023-11-30 DIAGNOSIS — I25.10 CORONARY ARTERY DISEASE INVOLVING NATIVE CORONARY ARTERY OF NATIVE HEART WITHOUT ANGINA PECTORIS: ICD-10-CM

## 2023-11-30 DIAGNOSIS — E03.9 ACQUIRED HYPOTHYROIDISM: ICD-10-CM

## 2023-11-30 DIAGNOSIS — K21.9 GASTROESOPHAGEAL REFLUX DISEASE, UNSPECIFIED WHETHER ESOPHAGITIS PRESENT: ICD-10-CM

## 2023-11-30 DIAGNOSIS — R25.2 LEG CRAMPS: ICD-10-CM

## 2023-11-30 DIAGNOSIS — R61 NIGHT SWEATS: Primary | ICD-10-CM

## 2023-11-30 DIAGNOSIS — R07.89 ATYPICAL CHEST PAIN: ICD-10-CM

## 2023-11-30 DIAGNOSIS — I10 ESSENTIAL HYPERTENSION: ICD-10-CM

## 2023-11-30 DIAGNOSIS — R61 NIGHT SWEATS: ICD-10-CM

## 2023-11-30 DIAGNOSIS — E78.00 PURE HYPERCHOLESTEROLEMIA: ICD-10-CM

## 2023-11-30 LAB
ATRIAL RATE: 45 BPM
P AXIS: 32 DEGREES
P-R INTERVAL: 202 MS
Q-T INTERVAL: 458 MS
QRS DURATION: 116 MS
QTC CALCULATION (BEZET): 396 MS
R AXIS: -32 DEGREES
T AXIS: 14 DEGREES
TSI SER-ACNC: 7.74 MIU/ML (ref 0.36–3.74)
VENTRICULAR RATE: 45 BPM

## 2023-11-30 PROCEDURE — 3078F DIAST BP <80 MM HG: CPT | Performed by: INTERNAL MEDICINE

## 2023-11-30 PROCEDURE — 93005 ELECTROCARDIOGRAM TRACING: CPT

## 2023-11-30 PROCEDURE — 3008F BODY MASS INDEX DOCD: CPT | Performed by: INTERNAL MEDICINE

## 2023-11-30 PROCEDURE — 1159F MED LIST DOCD IN RCRD: CPT | Performed by: INTERNAL MEDICINE

## 2023-11-30 PROCEDURE — 84443 ASSAY THYROID STIM HORMONE: CPT

## 2023-11-30 PROCEDURE — 36415 COLL VENOUS BLD VENIPUNCTURE: CPT

## 2023-11-30 PROCEDURE — 99214 OFFICE O/P EST MOD 30 MIN: CPT | Performed by: INTERNAL MEDICINE

## 2023-11-30 PROCEDURE — 3074F SYST BP LT 130 MM HG: CPT | Performed by: INTERNAL MEDICINE

## 2023-11-30 PROCEDURE — 93010 ELECTROCARDIOGRAM REPORT: CPT | Performed by: INTERNAL MEDICINE

## 2023-11-30 RX ORDER — TAMSULOSIN HYDROCHLORIDE 0.4 MG/1
CAPSULE ORAL
COMMUNITY
Start: 2023-11-22

## 2023-12-05 ENCOUNTER — TELEPHONE (OUTPATIENT)
Dept: INTERNAL MEDICINE CLINIC | Facility: CLINIC | Age: 78
End: 2023-12-05

## 2023-12-05 NOTE — TELEPHONE ENCOUNTER
Spoke to patient regarding symptoms that have been occurring for last 3 days. Dry cough, chills, tired, and nasal congestion/runny nose    Denies the following symptoms: fever, sob, cp, loss of taste/smell, sore throat, body aches, and   N/v/d. Eating and drinking okay. Advised to go to [facility] as no appts avaiable in office today. . Advised to go to ER in meantime for any emergent matters such as cp or sob. Verbalizes understanding. No additional questions.

## 2023-12-06 ENCOUNTER — HOSPITAL ENCOUNTER (OUTPATIENT)
Age: 78
Discharge: HOME | End: 2023-12-06
Payer: MEDICARE

## 2023-12-06 VITALS
HEART RATE: 55 BPM | DIASTOLIC BLOOD PRESSURE: 71 MMHG | OXYGEN SATURATION: 96 % | HEIGHT: 69 IN | SYSTOLIC BLOOD PRESSURE: 144 MMHG | TEMPERATURE: 99 F | BODY MASS INDEX: 27.4 KG/M2 | WEIGHT: 185 LBS | RESPIRATION RATE: 16 BRPM

## 2023-12-06 DIAGNOSIS — R05.9 COUGH: Primary | ICD-10-CM

## 2023-12-06 DIAGNOSIS — J34.89 RHINORRHEA: ICD-10-CM

## 2023-12-06 DIAGNOSIS — J06.9 VIRAL URI WITH COUGH: ICD-10-CM

## 2023-12-06 LAB
POCT INFLUENZA A: NEGATIVE
POCT INFLUENZA B: NEGATIVE
SARS-COV-2 RNA RESP QL NAA+PROBE: NOT DETECTED

## 2023-12-06 PROCEDURE — U0002 COVID-19 LAB TEST NON-CDC: HCPCS | Performed by: NURSE PRACTITIONER

## 2023-12-06 PROCEDURE — 99213 OFFICE O/P EST LOW 20 MIN: CPT | Performed by: NURSE PRACTITIONER

## 2023-12-06 PROCEDURE — 87502 INFLUENZA DNA AMP PROBE: CPT | Performed by: NURSE PRACTITIONER

## 2023-12-06 NOTE — DISCHARGE INSTRUCTIONS
Upper respiratory infection supportive care measures to try as applicable:  General:   - Wash hands often   - Disinfect your environment, linens, electronics, etc.   - Drink plenty of fluids (water, Pedialyte, etc.)   - Get plenty of rest and sleep with head elevated to help with sinus drainage and throat irritation   - Avoid having air blow on your face as this can worsen congestion / cough   - Do not share utensils or drinks   - Alternate Ibuprofen (adult: 600mg) and Tylenol (adult: 650-1000mg) as needed for pain / body aches / fever   - Symptoms may take a few weeks to resolve   - You may benefit from taking a daily multivitamin   - You may benefit from Zinc supplement (~10-20mg)   - You may benefit from Vitamin D daily (~2000u)   - Change toothbrush    Sore throat:   - Salt water gargles throughout the day for sore throat   - Cepacol lozenges as needed for sore throat    Cough / Sinus:   - You may benefit from spoonfuls (and/or added to warm drinks) of honey throughout the day for cough   - You may benefit from taking a decongestant (e.g. Sudafed - pseudoephedrine [behind the pharmacy counter])   - You may benefit from using a humidifier and/or steam showers   - You may benefit from Flonase nasal spray daily   - You may benefit from taking a daily allergy medication (e.g. Zyrtec, Xyzal, etc.)   - You may benefit from boiling water with lemon and cayenne pepper, then breathing in the steam (you can cover your head with a towel to help funnel the steam)

## 2023-12-10 DIAGNOSIS — K21.9 GASTROESOPHAGEAL REFLUX DISEASE, UNSPECIFIED WHETHER ESOPHAGITIS PRESENT: ICD-10-CM

## 2023-12-11 ENCOUNTER — APPOINTMENT (OUTPATIENT)
Dept: GENERAL RADIOLOGY | Age: 78
End: 2023-12-11
Attending: PHYSICIAN ASSISTANT
Payer: MEDICARE

## 2023-12-11 ENCOUNTER — HOSPITAL ENCOUNTER (OUTPATIENT)
Age: 78
Discharge: HOME | End: 2023-12-11
Payer: MEDICARE

## 2023-12-11 VITALS
OXYGEN SATURATION: 97 % | BODY MASS INDEX: 26.81 KG/M2 | SYSTOLIC BLOOD PRESSURE: 176 MMHG | HEIGHT: 69 IN | DIASTOLIC BLOOD PRESSURE: 91 MMHG | WEIGHT: 181 LBS | TEMPERATURE: 97 F | HEART RATE: 55 BPM | RESPIRATION RATE: 16 BRPM

## 2023-12-11 DIAGNOSIS — J18.9 PNEUMONIA OF LEFT UPPER LOBE DUE TO INFECTIOUS ORGANISM: Primary | ICD-10-CM

## 2023-12-11 PROCEDURE — 99214 OFFICE O/P EST MOD 30 MIN: CPT | Performed by: PHYSICIAN ASSISTANT

## 2023-12-11 PROCEDURE — 71046 X-RAY EXAM CHEST 2 VIEWS: CPT | Performed by: PHYSICIAN ASSISTANT

## 2023-12-11 RX ORDER — LEVOFLOXACIN 750 MG/1
750 TABLET, FILM COATED ORAL DAILY
Qty: 5 TABLET | Refills: 0 | Status: SHIPPED | OUTPATIENT
Start: 2023-12-11 | End: 2023-12-16

## 2023-12-12 RX ORDER — OMEPRAZOLE 20 MG/1
20 CAPSULE, DELAYED RELEASE ORAL DAILY
Qty: 90 CAPSULE | Refills: 1 | Status: SHIPPED | OUTPATIENT
Start: 2023-12-12

## 2023-12-12 NOTE — ED INITIAL ASSESSMENT (HPI)
Pt has had a violent cough with some sinus drainage that is not getting netter and causing him to cough. Pt was here 1 week ago with testing and xrays, and he has not got better. Pt has not gotten a fever.

## 2023-12-19 ENCOUNTER — HOSPITAL ENCOUNTER (OUTPATIENT)
Dept: ULTRASOUND IMAGING | Facility: HOSPITAL | Age: 78
Discharge: HOME | End: 2023-12-19
Attending: INTERNAL MEDICINE
Payer: MEDICARE

## 2023-12-19 DIAGNOSIS — R25.2 LEG CRAMPS: ICD-10-CM

## 2023-12-19 PROCEDURE — 93922 UPR/L XTREMITY ART 2 LEVELS: CPT | Performed by: INTERNAL MEDICINE

## 2023-12-29 DIAGNOSIS — E03.9 ACQUIRED HYPOTHYROIDISM: Primary | ICD-10-CM

## 2024-01-02 RX ORDER — LEVOTHYROXINE SODIUM 112 UG/1
112 TABLET ORAL
Qty: 30 TABLET | Refills: 0 | Status: SHIPPED | OUTPATIENT
Start: 2024-01-02

## 2024-01-13 DIAGNOSIS — K21.9 GASTROESOPHAGEAL REFLUX DISEASE, UNSPECIFIED WHETHER ESOPHAGITIS PRESENT: ICD-10-CM

## 2024-01-15 DIAGNOSIS — E03.9 ACQUIRED HYPOTHYROIDISM: ICD-10-CM

## 2024-01-17 RX ORDER — OMEPRAZOLE 20 MG/1
20 CAPSULE, DELAYED RELEASE ORAL DAILY
Qty: 90 CAPSULE | Refills: 1 | OUTPATIENT
Start: 2024-01-17

## 2024-01-18 RX ORDER — LEVOTHYROXINE SODIUM 112 UG/1
112 TABLET ORAL
Qty: 30 TABLET | Refills: 0 | OUTPATIENT
Start: 2024-01-18

## 2024-01-23 ENCOUNTER — LAB ENCOUNTER (OUTPATIENT)
Dept: LAB | Facility: HOSPITAL | Age: 79
End: 2024-01-23
Attending: INTERNAL MEDICINE
Payer: MEDICARE

## 2024-01-23 DIAGNOSIS — E03.9 ACQUIRED HYPOTHYROIDISM: ICD-10-CM

## 2024-01-23 DIAGNOSIS — R79.89 ELEVATED TSH: ICD-10-CM

## 2024-01-23 LAB — TSI SER-ACNC: 1.81 MIU/ML (ref 0.36–3.74)

## 2024-01-23 PROCEDURE — 84443 ASSAY THYROID STIM HORMONE: CPT

## 2024-01-23 PROCEDURE — 36415 COLL VENOUS BLD VENIPUNCTURE: CPT

## 2024-02-04 DIAGNOSIS — E03.9 ACQUIRED HYPOTHYROIDISM: ICD-10-CM

## 2024-02-05 ENCOUNTER — TELEPHONE (OUTPATIENT)
Dept: INTERNAL MEDICINE CLINIC | Facility: CLINIC | Age: 79
End: 2024-02-05

## 2024-02-05 DIAGNOSIS — E03.9 ACQUIRED HYPOTHYROIDISM: ICD-10-CM

## 2024-02-05 RX ORDER — LEVOTHYROXINE SODIUM 112 UG/1
112 TABLET ORAL
Qty: 30 TABLET | Refills: 0 | OUTPATIENT
Start: 2024-02-05

## 2024-02-05 RX ORDER — LEVOTHYROXINE SODIUM 112 UG/1
112 TABLET ORAL
Qty: 30 TABLET | Refills: 0 | Status: SHIPPED | OUTPATIENT
Start: 2024-02-05

## 2024-02-05 RX ORDER — LEVOTHYROXINE SODIUM 112 UG/1
112 TABLET ORAL
Qty: 90 TABLET | Refills: 1 | Status: SHIPPED | OUTPATIENT
Start: 2024-02-05 | End: 2024-02-05

## 2024-02-05 NOTE — TELEPHONE ENCOUNTER
Is this medication prescribed by the [facility] Providers? yes    Did the patient contact the pharmacy directly?:  no    Is patient out of meds or supply very low?:  out of med    Medication Requested:  LEVOTHYROXINE 112 MCG Oral Tab     Dose:      Is patient requesting a 30 or 90 day supply?:  90    Pharmacy name and phone # or location:  [facility]    Is the patient due for an appointment?: no  (if so, please schedule appt)    Additional Notes: Pt is out of medication-[facility] will put a few aside for him.    Please advise the patient refills take up to 72 business hours.

## 2024-02-05 NOTE — TELEPHONE ENCOUNTER
Thyroid Supplements Protocol [id number]/04/2024 08:42 AM   Protocol Details TSH test in past 12 months    TSH value between 0.350 and 5.500 IU/ml    Appointment in past 12 or next 3 months      Your thyroid test is normal. Continue the levothyroxine at 112mcg daily.   Future Appointments   Date Time Provider Department Center   5/14/2024 12:00 PM Lyn Matias MD [facility] [place]   Refilled per protocol to [facility].

## 2024-02-23 ENCOUNTER — PATIENT MESSAGE (OUTPATIENT)
Dept: INTERNAL MEDICINE CLINIC | Facility: CLINIC | Age: 79
End: 2024-02-23

## 2024-02-23 DIAGNOSIS — R39.9 LOWER URINARY TRACT SYMPTOMS (LUTS): ICD-10-CM

## 2024-02-23 DIAGNOSIS — E03.9 ACQUIRED HYPOTHYROIDISM: ICD-10-CM

## 2024-02-23 DIAGNOSIS — N40.0 BENIGN PROSTATIC HYPERPLASIA, UNSPECIFIED WHETHER LOWER URINARY TRACT SYMPTOMS PRESENT: ICD-10-CM

## 2024-02-23 DIAGNOSIS — Z12.5 PROSTATE CANCER SCREENING: ICD-10-CM

## 2024-02-23 NOTE — TELEPHONE ENCOUNTER
Mcm sent. Lmtcb. Need to clarify who is prescribing these as looks like most are cards, potentially uro, and aspirin otc? Unsure. Need to clarify.

## 2024-02-23 NOTE — TELEPHONE ENCOUNTER
From: Amilcar Dejesus  To: Claudia Merlos  Sent: 2/23/2024 12:00 PM CST  Subject: Claudia, Good Morning!    I have a number of medications that are not available for refill through My Chart at this time? These meds are being ordered through [facility] mail service.  Please resolve this issue:  Aspirin 81 mg.  Sidenafil Citrate 100 MG Tabs.  Atorvastain 80 mg.  Losartan 25 mg. tabs.  Ezetimibe 10mg.  Metoprolol succinate ER mg.24 Not sure if I should be taking this particular medication?  I will need to fill these medications in the very nesr future.  Thank you for your prompt attention to this request.  De Dejesus

## 2024-02-27 RX ORDER — SILDENAFIL 100 MG/1
100 TABLET, FILM COATED ORAL
Qty: 30 TABLET | Refills: 0 | Status: SHIPPED | OUTPATIENT
Start: 2024-02-27

## 2024-02-27 NOTE — TELEPHONE ENCOUNTER
Talked to pt and advised the pt to get refills on the cholesterol and BP medications to ask cardiology to fill for pt. Pt asking for refill on viagra    Last OV relevant to medication: 11/30/23   Last refill date: 3/25/22  30    #/refills: 0  When pt was asked to return for OV: 6 months   Upcoming appt/reason:   Future Appointments   Date Time Provider Department Center   5/14/2024 12:00 PM Lyn Matias MD [facility] [place]       Was pt informed of any over due labs: n/a   Lab Results   Component Value Date    GLU 89 06/01/2023    BUN 21 (H) 06/01/2023    BUNCREA 19.1 01/02/2021    CREATSERUM 0.92 06/01/2023    ANIONGAP 6 06/01/2023    GFR 79 05/27/2016    GFRNAA 84 01/02/2021    GFRAA 97 01/02/2021    CA 9.1 06/01/2023    OSMOCALC 292 06/01/2023    ALKPHO 56 06/01/2023    AST 20 06/01/2023    ALT 40 06/01/2023    BILT 1.4 06/01/2023    TP 6.6 06/01/2023    ALB 4.0 06/01/2023    GLOBULIN 2.6 (L) 06/01/2023     06/01/2023    K 4.0 06/01/2023     06/01/2023    CO2 25.0 06/01/2023

## 2024-02-28 NOTE — TELEPHONE ENCOUNTER
Functional Status Done?:  yes                             Functional Status Needs Review?:  no    Specialty Provider's Name?:  Dr Eustaquio Goldberg fax [phone]  Provider's Specialty?:  hematology  Reason for Visit?:  Annual check up  Diagnosis?:    Nu
LMOM for pt to advise of below information. Patient asked to call back with any additional questions.
Patient requesting a referral for Dr. Rei Sanchez, Hematologist for annual check up for thrombocytopenia. Patient states he was last seen there 2 years ago. Referral pended. Routed to Dr. Karen Krueger.       Triage: patient scheduled on 4/2/19, please fax referr
Referral placed by Dr. Brad Sexton. Please notify patient the referral is placed, this can take a couple of days to authorize. He will want to check with Dr. Ashu Kwan to make sure they take his insurance. Referral faxed to number provided.  Routed to BOD of
[FreeTextEntry1] : HST reviewed, mild [place], [place] [facility]

## 2024-03-13 ENCOUNTER — TELEPHONE (OUTPATIENT)
Dept: INTERNAL MEDICINE CLINIC | Facility: CLINIC | Age: 79
End: 2024-03-13

## 2024-03-13 DIAGNOSIS — M79.672 LEFT FOOT PAIN: Primary | ICD-10-CM

## 2024-03-13 NOTE — TELEPHONE ENCOUNTER
Insurance: [facility]  Provider's Name?: Dr Richardson   Provider's Specialty?: Podiatry    Reason for Visit?: Pain in left foot   Diagnosis?: N/A   Number of Visits Requested?: 3    Last Visit with Specialist?: 12/2023   Is Appt. Already Scheduled?: No        If so, Date?:  N/A  Once referral is approved pt can see referral via [facility]

## 2024-03-23 ENCOUNTER — HOSPITAL ENCOUNTER (OUTPATIENT)
Age: 79
Discharge: HOME | End: 2024-03-23
Payer: MEDICARE

## 2024-03-23 ENCOUNTER — APPOINTMENT (OUTPATIENT)
Dept: GENERAL RADIOLOGY | Age: 79
End: 2024-03-23
Attending: PHYSICIAN ASSISTANT
Payer: MEDICARE

## 2024-03-23 VITALS
SYSTOLIC BLOOD PRESSURE: 132 MMHG | DIASTOLIC BLOOD PRESSURE: 74 MMHG | RESPIRATION RATE: 22 BRPM | HEART RATE: 60 BPM | TEMPERATURE: 98 F | OXYGEN SATURATION: 95 %

## 2024-03-23 DIAGNOSIS — J06.9 VIRAL URI WITH COUGH: Primary | ICD-10-CM

## 2024-03-23 DIAGNOSIS — R93.89 ABNORMAL CXR: ICD-10-CM

## 2024-03-23 PROCEDURE — 71046 X-RAY EXAM CHEST 2 VIEWS: CPT | Performed by: PHYSICIAN ASSISTANT

## 2024-03-23 PROCEDURE — 99213 OFFICE O/P EST LOW 20 MIN: CPT | Performed by: PHYSICIAN ASSISTANT

## 2024-03-23 RX ORDER — BENZONATATE 100 MG/1
CAPSULE ORAL 3 TIMES DAILY PRN
Qty: 30 CAPSULE | Refills: 0 | Status: SHIPPED | OUTPATIENT
Start: 2024-03-23 | End: 2024-04-22

## 2024-03-23 NOTE — DISCHARGE INSTRUCTIONS
Tessalon Perles as needed for cough management.  Follow with primary care.  If you have new, changing or worsening symptoms go to the emergency department.

## 2024-03-23 NOTE — ED INITIAL ASSESSMENT (HPI)
Pt began with a URI that began 4 days ago and has settled in his chest.  No fever cough can be deep , and has sinus drainage from throat causing cough

## 2024-03-23 NOTE — ED PROVIDER NOTES
Patient Seen in: [facility]      History     Chief Complaint   Patient presents with    Cough/URI     Stated Complaint: cough    Subjective:   HPI    CHIEF COMPLAINT: Cough, nasal congestion for 4 days     HISTORY OF PRESENT ILLNESS: Patient is a 78-year-old male who presents for evaluation of 4 days of cough , congestion in the nose and in the chest.  No fever or chills.  No body aches.  No known sick contacts.  Denies chest pain or shortness of breath.  No vomiting or diarrhea.  No swelling in the legs.  Denies hearing any wheezing in the lungs.  States when he lays down at night the cough gets worse because he feels dripping down the back of the throat which triggers his cough.    About 6 months ago he had pneumonia.  States this feels different because he does not have the body aches and fatigue that he had with the pneumonia.     REVIEW OF SYSTEMS:  Constitutional: no fever, no chills  Eyes: no discharge  ENT: no sore throat  Cardiovascular: no chest pain, no palpitations  Respiratory: As above  Gastrointestinal: no abdominal pain, no vomiting  Genitourinary: no hematuria  Musculoskeletal: no back pain  Skin: no rashes  Neurological: no headache     Otherwise a complete review of systems was obtained and other than the HPI was negative     The patient's medication list, past medical history and social history elements is as listed in today's nurse's notes are reviewed and agree. The patient's family history is reviewed and is noncontributory to the presenting problem, except as indicated as above.    Objective:   Past Medical History:   Diagnosis Date    Actinic keratoses 10/29/2019    [facility]    Allergic rhinitis     Anxiety 2019    Arthritis 06/2020    left thumb    Atherosclerosis of coronary artery 2005    triple-bypass    Depression 2019    Heart attack (HCC)     Heart attack (HCC) 2005    Hyperthyroidism     Hypothyroidism     Lentigines 10/29/2019    [facility]     Lower urinary tract symptoms (LUTS) 2018    Multiple benign nevi 10/29/2019    [facility]    Other follicular cysts of the skin and subcutaneous tissue 10/29/2019    [facility] - Cyst - will monitor lesion on trunk.    Thrombocytopenia (HCC) 2018    Thyroid disease     Visual impairment     glasses              Past Surgical History:   Procedure Laterality Date    ANGIOPLASTY (CORONARY)  2015    stents x 2    CABG  2005    COLONOSCOPY  2019    OTHER  2009    prostate surgey    OTHER  2016    left rotator cuff    OTHER SURGICAL HISTORY  2017    torn rotator cuff    SKIN SURGERY      TONSILLECTOMY      VASECTOMY                  Social History     Socioeconomic History    Marital status:    Tobacco Use    Smoking status: Former     Packs/day: 0.50     Years: 20.00     Additional pack years: 0.00     Total pack years: 10.00     Types: Cigarettes     Quit date: 3/18/2005     Years since quittin.0    Smokeless tobacco: Never   Vaping Use    Vaping Use: Never used   Substance and Sexual Activity    Alcohol use: Yes     Alcohol/week: 1.0 standard drink of alcohol     Types: 1 Standard drinks or equivalent per week     Comment: Wine daily 2oz    Drug use: No   Other Topics Concern    Caffeine Concern No    Exercise Yes     Comment: 3 days a week- cardio and weights    Seat Belt Yes    Special Diet No    Stress Concern Yes     Comment: personal    Weight Concern No              Review of Systems    Positive for stated complaint: cough  Other systems are as noted in HPI.  Constitutional and vital signs reviewed.      All other systems reviewed and negative except as noted above.    Physical Exam     ED Triage Vitals [24 1145]   /74   Pulse 60   Resp 22   Temp 97.9 °F (36.6 °C)   Temp src Temporal   SpO2 95 %   O2 Device None (Room air)       Current:/74   Pulse 60   Temp 97.9 °F (36.6 °C) (Temporal)   Resp 22   SpO2 95%         Physical Exam    Vital  signs and nursing notes reviewed  General Appearance: Patient is alert and oriented x4 in no acute distress  Eyes: pupils equal and round, reactive to light. No pallor or injection, no sclera icterus  Ears: Bilateral TMs and canals clear  Throat: There is no erythema or exudates, no tonsillar hypertrophy.  no trismus or stridor. Uvula midline. No phonation changes, patient handling secretions well. Mucous membranes moist.  Respiratory: there are no retractions, lungs are clear to auscultation  Cardiovascular: regular rate and rhythm  Neurological:  Grossly intact, no deficits.  Gait normal.  Skin:  warm and dry, no rashes.  No jaundice. Brisk capillary refill  Musculoskeletal: neck is supple, no lymphadenopathy, non tender. no meningeal signs.  Extremities are symmetrical, full range of motion.  No pitting edema bilaterally  Psychiatric: calm and cooperative      ED Course   Labs Reviewed - No data to display          XR CHEST PA + LAT CHEST (CPT=71046)    Result Date: 3/23/2024  PROCEDURE:  XR CHEST PA + LAT CHEST (CPT=71046)  INDICATIONS:  cough  COMPARISON:  [facility], XR, XR CHEST PA + LAT CHEST (CPT=71046), 12/11/2023, 6:39 PM.  TECHNIQUE:  PA and lateral chest radiographs were obtained.  PATIENT STATED HISTORY: (As transcribed by Technologist)  Patient has had a cough and congestion for 4 days.    FINDINGS:  Cardiac silhouette is stable.  Patient is status post median sternotomy.  Fracture of the 4th sternotomy wire from the top is again noted.  Mild vascular congestion is present.  Bibasilar atelectatic changes are present.  There is no effusion or pneumothorax.  No focal consolidation is identified.            CONCLUSION:  Mild bibasilar streaky opacities likely represents atelectasis.  Mild vascular congestion with chronic interstitial changes are noted in the lungs.   LOCATION:  [facility]   Dictated by (CST): Irving Araujo MD on 3/23/2024 at 12:38 PM     Finalized by (CST): Irving Araujo MD on  3/23/2024 at 12:40 PM           Differential diagnosis is viral URI such as COVID or flu, bronchitis, pneumonia    MDM      This is a well-appearing 78-year-old male who presents for evaluation of URI symptoms for the last 4 days.  He reports nasal congestion and a cough that worsens when he lays down, but he attributes that to postnasal drip.  Declined flu and COVID swabs.  Had history of pneumonia in the last couple of months we will repeat chest x-ray was performed.  Chest x-ray showed mild bibasilar streaky opacities likely representing atelectasis.  Also showed mild vascular congestion with chronic interstitial changes.  I reviewed these findings with the patient.  I also reviewed his stress echo from August 2023.  Ejection fraction was 65% at that time.  No evidence of overt heart failure on exam today.  Lung sounds are clear and there is no edema in the extremities.  Patient does not complain of shortness of breath.  I will give him something prescription strength for cough; he has taken Tessalon before and says he has done well with that so Tessalon Perles sent to his pharmacy.  I would like him to follow with the primary care regarding his chest x-ray results.  I sent his PCP and note in epic so she is aware.  If there are any new, changing or worsening symptoms go to the ER.  Patient voiced understanding to the treatment plan.  All questions answered.  Case discussed with Dr. Mendes, ED physician covering immediate care, who agrees with the disposition and plan.                                 Medical Decision Making  Amount and/or Complexity of Data Reviewed  Radiology: ordered. Decision-making details documented in ED Course.    Risk  Prescription drug management.        Disposition and Plan     Clinical Impression:  1. Viral URI with cough    2. Abnormal CXR         Disposition:  Discharge  3/23/2024 12:59 pm    Follow-up:  Lyn Matias MD  [address] [facility]  [address] [place] [address]  [id number]  [phone]    In 3 days            Medications Prescribed:  Discharge Medication List as of 3/23/2024  1:06 PM        START taking these medications    Details   !! benzonatate 100 MG Oral Cap Take 1-2 capsules (100-200 mg total) by mouth 3 (three) times daily as needed., Normal, Disp-30 capsule, R-0       !! - Potential duplicate medications found. Please discuss with provider.

## 2024-03-25 ENCOUNTER — TELEPHONE (OUTPATIENT)
Dept: INTERNAL MEDICINE CLINIC | Facility: CLINIC | Age: 79
End: 2024-03-25

## 2024-03-25 NOTE — TELEPHONE ENCOUNTER
See note from PA below.   Pt needs appt for uc follow up so as to address some of findings seen during that visit.   He can see me or alex.

## 2024-03-25 NOTE — TELEPHONE ENCOUNTER
----- Message from Radha Hall PA-C sent at 3/23/2024  2:52 PM CDT -----  Hi Dr. Matias. I saw your patient De Dejesus at the [facility]. He presented for evaluation of URI symptoms for the last 4 days.  He reported nasal congestion and a cough that worsens when he lays down, but he attributes that to postnasal drip.  Declined flu and COVID swabs.  Had history of pneumonia in the last couple of months so I repeated his chest x-ray.  Chest x-ray showed mild bibasilar streaky opacities likely representing atelectasis.  Also showed mild vascular congestion with chronic interstitial changes.  I reviewed these findings with the patient.  I also reviewed his stress echo from August 2023.  Ejection fraction was 65% at that time.  No evidence of overt heart failure on exam today.  Lung sounds are clear and there is no edema in the extremities.  Patient does not complain of shortness of breath.  I gave him something prescription strength for cough.  I asked him to follow-up with you regarding the chest x-ray findings.  I wanted to give you a heads up about that.  My note is in the chart and you can review it at your convenience.  If you have any questions, please feel free to reach out.  Otherwise consider this an FYI email.    Thanks,    Radha Hall PA-C  ED/IC PA

## 2024-03-26 NOTE — TELEPHONE ENCOUNTER
Spoke to pt, apt scheduled for next week. He endorses he does feel better from UC visit, medication has helped and cough is better. Not productive any longer but does feel like he might have some mucous production/congestion in chest. Denies chest pain/pressure or sob but does endorse feeling slightly winded with more exertional activities at the moment, does not feel this at rest or prior to URI. Advised on home cares such as humidifier, cough/deep breathing exercises and if symptoms worsen to follow up in office sooner and he verbalized understanding.    Future Appointments   Date Time Provider Department Center   4/4/2024  8:40 AM Nunu Du APRN [facility]   5/14/2024 12:00 PM Lyn Matias MD [facility]

## 2024-04-04 ENCOUNTER — TELEPHONE (OUTPATIENT)
Dept: INTERNAL MEDICINE CLINIC | Facility: CLINIC | Age: 79
End: 2024-04-04

## 2024-04-04 ENCOUNTER — HOSPITAL ENCOUNTER (OUTPATIENT)
Dept: GENERAL RADIOLOGY | Age: 79
Discharge: HOME | End: 2024-04-04
Attending: NURSE PRACTITIONER
Payer: MEDICARE

## 2024-04-04 ENCOUNTER — OFFICE VISIT (OUTPATIENT)
Dept: INTERNAL MEDICINE CLINIC | Facility: CLINIC | Age: 79
End: 2024-04-04
Payer: COMMERCIAL

## 2024-04-04 VITALS
DIASTOLIC BLOOD PRESSURE: 74 MMHG | HEIGHT: 69 IN | HEART RATE: 49 BPM | RESPIRATION RATE: 16 BRPM | OXYGEN SATURATION: 94 % | BODY MASS INDEX: 27.2 KG/M2 | WEIGHT: 183.63 LBS | TEMPERATURE: 98 F | SYSTOLIC BLOOD PRESSURE: 116 MMHG

## 2024-04-04 DIAGNOSIS — N40.0 BENIGN PROSTATIC HYPERPLASIA, UNSPECIFIED WHETHER LOWER URINARY TRACT SYMPTOMS PRESENT: ICD-10-CM

## 2024-04-04 DIAGNOSIS — M25.562 ACUTE PAIN OF LEFT KNEE: ICD-10-CM

## 2024-04-04 DIAGNOSIS — I25.10 CORONARY ARTERY DISEASE INVOLVING NATIVE CORONARY ARTERY OF NATIVE HEART WITHOUT ANGINA PECTORIS: ICD-10-CM

## 2024-04-04 DIAGNOSIS — R93.89 ABNORMAL CXR: Primary | ICD-10-CM

## 2024-04-04 DIAGNOSIS — M25.572 ACUTE LEFT ANKLE PAIN: ICD-10-CM

## 2024-04-04 DIAGNOSIS — R93.89 ABNORMAL CXR: ICD-10-CM

## 2024-04-04 PROCEDURE — 73564 X-RAY EXAM KNEE 4 OR MORE: CPT | Performed by: NURSE PRACTITIONER

## 2024-04-04 PROCEDURE — 99214 OFFICE O/P EST MOD 30 MIN: CPT | Performed by: NURSE PRACTITIONER

## 2024-04-04 PROCEDURE — 1170F FXNL STATUS ASSESSED: CPT | Performed by: NURSE PRACTITIONER

## 2024-04-04 PROCEDURE — 1159F MED LIST DOCD IN RCRD: CPT | Performed by: NURSE PRACTITIONER

## 2024-04-04 PROCEDURE — 3074F SYST BP LT 130 MM HG: CPT | Performed by: NURSE PRACTITIONER

## 2024-04-04 PROCEDURE — 73610 X-RAY EXAM OF ANKLE: CPT | Performed by: NURSE PRACTITIONER

## 2024-04-04 PROCEDURE — 3078F DIAST BP <80 MM HG: CPT | Performed by: NURSE PRACTITIONER

## 2024-04-04 PROCEDURE — 71046 X-RAY EXAM CHEST 2 VIEWS: CPT | Performed by: NURSE PRACTITIONER

## 2024-04-04 PROCEDURE — 3008F BODY MASS INDEX DOCD: CPT | Performed by: NURSE PRACTITIONER

## 2024-04-04 PROCEDURE — G2211 COMPLEX E/M VISIT ADD ON: HCPCS | Performed by: NURSE PRACTITIONER

## 2024-04-04 RX ORDER — KETOCONAZOLE 20 MG/ML
SHAMPOO TOPICAL
COMMUNITY
Start: 2024-02-05

## 2024-04-04 RX ORDER — FINASTERIDE 5 MG/1
5 TABLET, FILM COATED ORAL DAILY
Qty: 30 TABLET | Refills: 0 | Status: SHIPPED | OUTPATIENT
Start: 2024-04-04

## 2024-04-04 NOTE — TELEPHONE ENCOUNTER
Pt was to have chest x-ray repeated in 3-5 weeks. He went to get knee and ankle x ray at [facility] today as planned and it looks like they did chest x-ray now as well. I tried calling over to [facility] radiology exam room to try to prevent the chest x-ray but couldn't get through. Spoke to their registration desk, she advised that they only scheduled the pt for knee and ankle x-ray as planned but they still did the chest. Pt shouldn't be charged for this. I ordered the future exam to be completed and Nunu aware but can you please assist with getting this reversed? Let me know if you have questions. Thanks!

## 2024-05-02 NOTE — TELEPHONE ENCOUNTER
Last OV relevant to medication: 4/4/24  Last refill date: historical      #/refills:   When pt was asked to return for OV:   Upcoming appt/reason:   Future Appointments   Date Time Provider Department Center   5/14/2024 12:00 PM Lyn Matias MD [facility]       Was pt informed of any over due labs: n/a   Lab Results   Component Value Date    GLU 89 06/01/2023    BUN 21 (H) 06/01/2023    BUNCREA 19.1 01/02/2021    CREATSERUM 0.92 06/01/2023    ANIONGAP 6 06/01/2023    GFR 79 05/27/2016    GFRNAA 84 01/02/2021    GFRAA 97 01/02/2021    CA 9.1 06/01/2023    OSMOCALC 292 06/01/2023    ALKPHO 56 06/01/2023    AST 20 06/01/2023    ALT 40 06/01/2023    BILT 1.4 06/01/2023    TP 6.6 06/01/2023    ALB 4.0 06/01/2023    GLOBULIN 2.6 (L) 06/01/2023     06/01/2023    K 4.0 06/01/2023     06/01/2023    CO2 25.0 06/01/2023

## 2024-05-12 NOTE — PROGRESS NOTES
Subjective:   Amilcar Dejesus is a 79 year old male who presents for a MA (Medicare Advantage) Supervisit (Once per calendar year) and med check.     Colonoscopy done 6/2018 by Dr. Spence. due 2028.   Sees urology for psa and prostate exam. Has not seen them since 2022.      Due prevnar 20. Done today.   Due tdap, shingrix, covid booster (last done 10/2023), rsv.      [facility] flowsheet reviewed.   No falls.   Memory testing normal. He does complain of memory loss and forgetfulness.   Mood has been stable. No depression. Has some anxiety at times but able to manage it conservatively.   Seeing eye doctor yearly.     CAGE score is 2. He is drinking about 2-3 oz wine a night now. He is working on cutting down more.     History/Other:   Fall Risk Assessment:   He has been screened for Falls and is High Risk. Fall Prevention information provided to patient in After Visit Summary.    Do you feel unsteady when standing or walking?: Yes  Do you worry about falling?: Yes  Have you fallen in the past year?: Yes  How many times have you fallen?: (P) 4  Were you injured?: (P) No     Cognitive Assessment:   He had a completely normal cognitive assessment - see flowsheet entries       Functional Ability/Status:   Amilcar Dejesus has some abnormal functions as listed below:  He has difficulties Affording Meds based on screening of functional status. He has Hearing problems based on screening of functional status.He has Vision problems based on screening of functional status. He has Walking problems based on screening of functional status. He has problems with Memory based on screening of functional status.       Depression Screening (PHQ-2/PHQ-9): PHQ-2 SCORE: 0  , done 5/13/2024   Last [place] Suicide Screening on 5/14/2024 was No Risk.       Advanced Directives:   He does NOT have a Living Will. [Do you have a living will?: No]  He does NOT have a Power of  for Health Care. [Do you have a healthcare power of  ?: No (packet given)]  Discussed with patient and provided information.        Patient Active Problem List   Diagnosis    Hypothyroid    Coronary artery disease involving native coronary artery of native heart without angina pectoris    Thrombocytopenia (HCC)    Lower urinary tract symptoms (LUTS)    Pure hypercholesterolemia    Facet arthritis of lumbosacral region    Essential hypertension    Memory loss    Gastroesophageal reflux disease    Abnormal cardiovascular stress test     Allergies:  He is allergic to penicillins.    Current Medications:  Outpatient Medications Marked as Taking for the 5/14/24 encounter (Office Visit) with Lyn Matias MD   Medication Sig    ketoconazole 2 % External Shampoo     finasteride 5 MG Oral Tab Take 1 tablet (5 mg total) by mouth daily.    Sildenafil Citrate (VIAGRA) 100 MG Oral Tab Take 1 tablet (100 mg total) by mouth daily as needed for Erectile Dysfunction.    levothyroxine 112 MCG Oral Tab Take 1 tablet (112 mcg total) by mouth before breakfast.    omeprazole 20 MG Oral Capsule Delayed Release Take 1 capsule (20 mg total) by mouth daily. (Patient taking differently: Take 1 capsule (20 mg total) by mouth as needed.)    tamsulosin 0.4 MG Oral Cap Take 1 capsule (0.4 mg total) by mouth in the morning and 1 capsule (0.4 mg total) before bedtime.    benzonatate 200 MG Oral Cap Take 1 capsule (200 mg total) by mouth 3 (three) times daily as needed for cough.    Simethicone (GAS-X OR) daily as needed.    atorvastatin 80 MG Oral Tab Take 1 tablet (80 mg total) by mouth daily.    Losartan Potassium 25 MG Oral Tab Take 1 tablet (25 mg total) by mouth nightly.    ezetimibe 10 MG Oral Tab Take 1 tablet (10 mg total) by mouth daily.    Betamethasone Dipropionate 0.05 % External Lotion APPLY TO AFFECTED AREA ONCE DAILY AFTER A SHOWER AS NEEDED AVOID FACE, UNDERARMS, AND GROIN    POTASSIUM OR Take by mouth as needed.      aspirin 81 MG Oral Tab Take 1 tablet (81 mg total) by  mouth nightly.    Multiple Vitamins-Minerals (CENTRUM CARDIO OR) Take by mouth daily.       Medical History:  He  has a past medical history of Actinic keratoses (10/29/2019), Allergic rhinitis, Anxiety (), Arthritis (2020), Atherosclerosis of coronary artery (), Depression (), Heart attack (HCC), Heart attack (HCC) (), Hyperthyroidism, Hypothyroidism, Lentigines (10/29/2019), Lower urinary tract symptoms (LUTS) (2018), Multiple benign nevi (10/29/2019), Other follicular cysts of the skin and subcutaneous tissue (10/29/2019), Thrombocytopenia (HCC) (2018), Thyroid disease, and Visual impairment.  Surgical History:  He  has a past surgical history that includes angioplasty (coronary) (2015); cabg (); tonsillectomy; other (); other (2016); colonoscopy (); other surgical history (); skin surgery; and vasectomy.   Family History:  His family history includes Heart Attack (age of onset: 92) in an other family member.  Social History:  He  reports that he quit smoking about 19 years ago. His smoking use included cigarettes. He started smoking about 39 years ago. He has a 10 pack-year smoking history. He has been exposed to tobacco smoke. He has never used smokeless tobacco. He reports current alcohol use of about 1.0 standard drink of alcohol per week. He reports that he does not use drugs.    Tobacco:  He smoked tobacco in the past but quit greater than 12 months ago.  Social History     Tobacco Use   Smoking Status Former    Current packs/day: 0.00    Average packs/day: 0.5 packs/day for 20.0 years (10.0 ttl pk-yrs)    Types: Cigarettes    Start date: 3/18/1985    Quit date: 3/18/2005    Years since quittin.1    Passive exposure: Past   Smokeless Tobacco Never        CAGE Alcohol Screen:   He has been screened for alcohol abuse and his score is not 0:  Cut: Have you ever felt you should Cut down on your drinking?: Yes  Annoyed: Have people Annoyed you by  criticizing your drinking?: No  Guilty: Have you ever felt bad or Guilty about your drinking?: Yes  Eye Opener: Have you ever had a drink first thing in the morning to steady your nerves or to get rid of a hangover (Eye opener)?: No  Total Score: 2      Patient Care Team:  Lyn Matias MD as PCP - General (Internal Medicine)  Austyn George MD as Consulting Physician (CARDIOLOGY)  Gloria Mckeon MD (ONCOLOGY)  Lali Esparza PT as Physical Therapist  Dennise Bullard PT as Physical Therapist  Victorina Wakefield PT as Physical Therapist (Physical Therapy)  Noa Fontana MD (DERMATOLOGY)    Review of Systems  GENERAL: feels well otherwise  SKIN: denies any unusual skin lesions  EYES:denies blurred vision or double vision  HEENT: denies nasal congestion, sinus pain or ST  LUNGS: denies shortness of breath with exertion  CARDIOVASCULAR: denies chest pain on exertion  GI: denies abdominal pain,denies heartburn  : denies dysuria  MUSCULOSKELETAL: denies back pain  NEURO: denies headaches  PSYCHE: denies depression or anxiety  HEMATOLOGIC: denies hx of anemia  ENDOCRINE: denies thyroid history  ALL/ASTHMA: denies hx of allergy or asthma     Objective:   Physical Exam  General Appearance:  Alert, cooperative, no distress, appears stated age   Head:  Normocephalic, without obvious abnormality, atraumatic   Eyes:  PERRL, conjunctiva/corneas clear, EOM's intact, both eyes   Ears:  Normal TM's and external ear canals, both ears   Nose: Nares normal, septum midline, mucosa normal, no drainage or sinus tenderness   Throat: Lips, mucosa, and tongue normal; teeth and gums normal   Neck: Supple, symmetrical, trachea midline, no adenopathy, thyroid: not enlarged, no carotid bruit or JVD   Back:   Symmetric, no curvature, ROM normal, no CVA tenderness   Lungs:   Clear to auscultation bilaterally, respirations unlabored   Chest Wall:  No tenderness or deformity   Heart:  Regular rate and rhythm, S1, S2  normal, no murmur, rub or gallop   Abdomen:   Soft, non-tender, bowel sounds active all four quadrants,  no masses, no organomegaly   Genitalia: Deferred. No symptoms.    Rectal: Deferred. Sees urology.    Extremities: Extremities normal, atraumatic, no cyanosis or edema   Pulses: 2+ and symmetric   Skin: Skin color, texture, turgor normal, no rashes or lesions   Lymph nodes: Cervical, supraclavicular, and axillary nodes normal   Neurologic: Normal     /70 (BP Location: Left arm, Patient Position: Sitting, Cuff Size: adult)   Pulse (!) 48   Temp 98 °F (36.7 °C) (Temporal)   Resp 12   Ht 5' 9\" (1.753 m)   Wt 181 lb 4.8 oz (82.2 kg)   BMI 26.77 kg/m²  Estimated body mass index is 26.77 kg/m² as calculated from the following:    Height as of this encounter: 5' 9\" (1.753 m).    Weight as of this encounter: 181 lb 4.8 oz (82.2 kg).    Medicare Hearing Assessment:   Hearing Screening    Screening Method: Finger Rub  Finger Rub Result: Pass               Assessment & Plan:   Amilcar Dejesus is a 79 year old male who presents for a Medicare Assessment.   1. Encounter for annual health examination  Colonoscopy done 6/2018 by Dr. Spence. due 2028.   Sees urology for psa and prostate exam. Has not seen them since 2022.      Due prevnar 20. Done today.   Due tdap, shingrix, covid booster (last done 10/2023), rsv.      [facility] flowsheet reviewed.   No falls.   Memory testing normal. He does complain of memory loss and forgetfulness.   Mood has been stable. No depression. Has some anxiety at times but able to manage it conservatively.   Seeing eye doctor yearly.     CAGE score is 2. He is drinking about 2-3 oz wine a night now. He is working on cutting down more.     2. Need for vaccination  - Prevnar 20 (PCV20) [93631]    3. Essential hypertension  Continue meds.     4. Pure hypercholesterolemia  Continue zetia and atorvastatin.   - Lipid Panel; Future  - Comp Metabolic Panel (14) [E]; Future    5. Coronary artery  disease involving native coronary artery of native heart without angina pectoris  6. Abnormal cardiovascular stress test  follow up [facility] cardiology.   Referral reprinted for him today.   He has no exertional chest pain or shortness of breath.     7. Thrombocytopenia (HCC)  Recheck. Ordered.     8. Acquired hypothyroidism  Continue levothyroxine.   - TSH [E]; Future    9. Memory loss  Referred again to neuro.   - Vitamin B12 [E]; Future  - Folic Acid Serum (Folate); Future  - NEURO - INTERNAL    10. Facet arthritis of lumbosacral region  Stable. Continue current management.      11. Gastroesophageal reflux disease, unspecified whether esophagitis present  Stable. Continue current management.      12. Lower urinary tract symptoms (LUTS)  follow up with urology.     13. Benign prostatic hyperplasia with urinary frequency  follow up with urology.   - Urology Referral - [facility]    14. Night sweats  Negative blood work, tb test, ct chest abdomen pelvis, echo.   Refer to hematology for evaluation.   - Oncology/Hematology Referral - [facility] ([place])    15. Balance problem  Has a hard time standing on his tip toes, falls if he stands on his toes.   No dizziness, numbness, tingling, weakness. Normal gait is fine.   Trial PT.   Exam with normal strength, normal reflexes, normal sensation today. Normal casual gait. Difficulty with standing on tip toes.   - Physical Therapy Referral - [place] [facility]        The patient indicates understanding of these issues and agrees to the plan.  Reinforced healthy diet, lifestyle, and exercise.      Return in about 6 months (around 11/14/2024) for med check.     Lyn Matias MD, 5/12/2024     Supplementary Documentation:   General Health:  In the past six months, have you lost more than 10 pounds without trying?: 2 - No  Has your appetite been poor?: No  Type of Diet: Balanced  How does the patient maintain a good energy level?: Appropriate Exercise  How would you describe your daily  physical activity?: Moderate  How would you describe your current health state?: Good  How do you maintain positive mental well-being?: Social Interaction;Visiting Friends;Visiting Family  On a scale of 0 to 10, with 0 being no pain and 10 being severe pain, what is your pain level?: 5 - (Moderate) (left knee and ankle.)  In the past six months, have you experienced urine leakage?: 1-Yes  At any time do you feel concerned for the safety/well-being of yourself and/or your children, in your home or elsewhere?: Yes  Have you had any immunizations at another office such as Influenza, Hepatitis B, Tetanus, or Pneumococcal?: No        Amilcar Dejesus's SCREENING SCHEDULE   Tests on this list are recommended by your physician but may not be covered, or covered at this frequency, by your insurer.   Please check with your insurance carrier before scheduling to verify coverage.   PREVENTATIVE SERVICES FREQUENCY &  COVERAGE DETAILS LAST COMPLETION DATE   Diabetes Screening    Fasting Blood Sugar / Glucose    One screening every 12 months if never tested or if previously tested but not diagnosed with pre-diabetes   One screening every 6 months if diagnosed with pre-diabetes Lab Results   Component Value Date    GLU 89 06/01/2023        Cardiovascular Disease Screening    Lipid Panel  Cholesterol  Lipoprotein (HDL)  Triglycerides Covered every 5 years for all Medicare beneficiaries without apparent signs or symptoms of cardiovascular disease Lab Results   Component Value Date    CHOLEST 122 04/10/2023    HDL 76 (H) 04/10/2023    LDL 33 04/10/2023    TRIG 58 04/10/2023         Electrocardiogram (EKG)   Covered if needed at Welcome to Medicare, and non-screening if indicated for medical reasons 11/30/2023      Ultrasound Screening for Abdominal Aortic Aneurysm (AAA) Covered once in a lifetime for one of the following risk factors    Men who are 65-75 years old and have ever smoked    Anyone with a family history -      Colorectal Cancer Screening  Covered for ages 50-85; only need ONE of the following:    Colonoscopy   Covered every 10 years    Covered every 2 years if patient is at high risk or previous colonoscopy was abnormal 06/14/2018    No recommendations at this time    Flexible Sigmoidoscopy   Covered every 4 years -    Fecal Occult Blood Test Covered annually -   Prostate Cancer Screening    Prostate-Specific Antigen (PSA) Annually Lab Results   Component Value Date    PSA 1.92 02/15/2021     There are no preventive care reminders to display for this patient.   Immunizations    Influenza Covered once per flu season  Please get every year 10/19/2023  No recommendations at this time    Pneumococcal Each vaccine ([id number] & [id number]) covered once after 65 Prevnar 13: 05/12/2016    [id number]: 10/05/2011     No recommendations at this time    Hepatitis B One screening covered for patients with certain risk factors   -  No recommendations at this time    Tetanus Toxoid Not covered by Medicare Part B unless medically necessary (cut with metal); may be covered with your pharmacy prescription benefits -    Tetanus, Diptheria and Pertusis TD and TDaP Not covered by Medicare Part B -  No recommendations at this time    Zoster Not covered by Medicare Part B; may be covered with your pharmacy  prescription benefits 07/05/2013  Zoster Vaccines(2 of 3) due on 08/30/2013     Annual Monitoring of Persistent Medications (ACE/ARB, digoxin diuretics, anticonvulsants)    Potassium Annually Lab Results   Component Value Date    K 4.0 06/01/2023         Creatinine   Annually Lab Results   Component Value Date    CREATSERUM 0.92 06/01/2023         BUN Annually Lab Results   Component Value Date    BUN 21 (H) 06/01/2023       Drug Serum Conc Annually No results found for: \"DIGOXIN\", \"DIG\", \"VALP\"

## 2024-05-14 ENCOUNTER — HOSPITAL ENCOUNTER (OUTPATIENT)
Dept: GENERAL RADIOLOGY | Age: 79
Discharge: HOME | End: 2024-05-14
Attending: NURSE PRACTITIONER

## 2024-05-14 ENCOUNTER — OFFICE VISIT (OUTPATIENT)
Dept: INTERNAL MEDICINE CLINIC | Facility: CLINIC | Age: 79
End: 2024-05-14

## 2024-05-14 VITALS
BODY MASS INDEX: 26.85 KG/M2 | RESPIRATION RATE: 12 BRPM | TEMPERATURE: 98 F | WEIGHT: 181.31 LBS | DIASTOLIC BLOOD PRESSURE: 70 MMHG | SYSTOLIC BLOOD PRESSURE: 114 MMHG | HEART RATE: 48 BPM | HEIGHT: 69 IN

## 2024-05-14 DIAGNOSIS — R35.0 BENIGN PROSTATIC HYPERPLASIA WITH URINARY FREQUENCY: ICD-10-CM

## 2024-05-14 DIAGNOSIS — D69.6 THROMBOCYTOPENIA (HCC): ICD-10-CM

## 2024-05-14 DIAGNOSIS — I25.10 CORONARY ARTERY DISEASE INVOLVING NATIVE CORONARY ARTERY OF NATIVE HEART WITHOUT ANGINA PECTORIS: ICD-10-CM

## 2024-05-14 DIAGNOSIS — Z00.00 ENCOUNTER FOR ANNUAL HEALTH EXAMINATION: ICD-10-CM

## 2024-05-14 DIAGNOSIS — E03.9 ACQUIRED HYPOTHYROIDISM: ICD-10-CM

## 2024-05-14 DIAGNOSIS — R93.89 ABNORMAL CXR: ICD-10-CM

## 2024-05-14 DIAGNOSIS — R41.3 MEMORY LOSS: ICD-10-CM

## 2024-05-14 DIAGNOSIS — R61 NIGHT SWEATS: ICD-10-CM

## 2024-05-14 DIAGNOSIS — R26.89 BALANCE PROBLEM: ICD-10-CM

## 2024-05-14 DIAGNOSIS — Z23 NEED FOR VACCINATION: ICD-10-CM

## 2024-05-14 DIAGNOSIS — I10 ESSENTIAL HYPERTENSION: ICD-10-CM

## 2024-05-14 DIAGNOSIS — N40.1 BENIGN PROSTATIC HYPERPLASIA WITH URINARY FREQUENCY: ICD-10-CM

## 2024-05-14 DIAGNOSIS — R94.39 ABNORMAL CARDIOVASCULAR STRESS TEST: ICD-10-CM

## 2024-05-14 DIAGNOSIS — M47.817 FACET ARTHRITIS OF LUMBOSACRAL REGION: ICD-10-CM

## 2024-05-14 DIAGNOSIS — R39.9 LOWER URINARY TRACT SYMPTOMS (LUTS): ICD-10-CM

## 2024-05-14 DIAGNOSIS — E78.00 PURE HYPERCHOLESTEROLEMIA: ICD-10-CM

## 2024-05-14 DIAGNOSIS — K21.9 GASTROESOPHAGEAL REFLUX DISEASE, UNSPECIFIED WHETHER ESOPHAGITIS PRESENT: ICD-10-CM

## 2024-05-14 PROCEDURE — 71046 X-RAY EXAM CHEST 2 VIEWS: CPT | Performed by: NURSE PRACTITIONER

## 2024-05-14 PROCEDURE — G0439 PPPS, SUBSEQ VISIT: HCPCS | Performed by: INTERNAL MEDICINE

## 2024-05-14 PROCEDURE — 1170F FXNL STATUS ASSESSED: CPT | Performed by: INTERNAL MEDICINE

## 2024-05-14 PROCEDURE — 3078F DIAST BP <80 MM HG: CPT | Performed by: INTERNAL MEDICINE

## 2024-05-14 PROCEDURE — 3008F BODY MASS INDEX DOCD: CPT | Performed by: INTERNAL MEDICINE

## 2024-05-14 PROCEDURE — 99499 UNLISTED E&M SERVICE: CPT | Performed by: INTERNAL MEDICINE

## 2024-05-14 PROCEDURE — G0009 ADMIN PNEUMOCOCCAL VACCINE: HCPCS | Performed by: INTERNAL MEDICINE

## 2024-05-14 PROCEDURE — 90677 PCV20 VACCINE IM: CPT | Performed by: INTERNAL MEDICINE

## 2024-05-14 PROCEDURE — 99214 OFFICE O/P EST MOD 30 MIN: CPT | Performed by: INTERNAL MEDICINE

## 2024-05-14 PROCEDURE — 96160 PT-FOCUSED HLTH RISK ASSMT: CPT | Performed by: INTERNAL MEDICINE

## 2024-05-14 PROCEDURE — 3074F SYST BP LT 130 MM HG: CPT | Performed by: INTERNAL MEDICINE

## 2024-05-14 NOTE — PATIENT INSTRUCTIONS
Amilcar Dejesus's SCREENING SCHEDULE   Tests on this list are recommended by your physician but may not be covered, or covered at this frequency, by your insurer.   Please check with your insurance carrier before scheduling to verify coverage.   PREVENTATIVE SERVICES FREQUENCY &  COVERAGE DETAILS LAST COMPLETION DATE   Diabetes Screening    Fasting Blood Sugar / Glucose    One screening every 12 months if never tested or if previously tested but not diagnosed with pre-diabetes   One screening every 6 months if diagnosed with pre-diabetes Lab Results   Component Value Date    GLU 89 06/01/2023        Cardiovascular Disease Screening    Lipid Panel  Cholesterol  Lipoprotein (HDL)  Triglycerides Covered every 5 years for all Medicare beneficiaries without apparent signs or symptoms of cardiovascular disease Lab Results   Component Value Date    CHOLEST 122 04/10/2023    HDL 76 (H) 04/10/2023    LDL 33 04/10/2023    TRIG 58 04/10/2023         Electrocardiogram (EKG)   Covered if needed at Welcome to Medicare, and non-screening if indicated for medical reasons 11/30/2023      Ultrasound Screening for Abdominal Aortic Aneurysm (AAA) Covered once in a lifetime for one of the following risk factors    Men who are 65-75 years old and have ever smoked    Anyone with a family history -     Colorectal Cancer Screening  Covered for ages 50-85; only need ONE of the following:    Colonoscopy   Covered every 10 years    Covered every 2 years if patient is at high risk or previous colonoscopy was abnormal 06/14/2018    No recommendations at this time    Flexible Sigmoidoscopy   Covered every 4 years -    Fecal Occult Blood Test Covered annually -   Prostate Cancer Screening    Prostate-Specific Antigen (PSA) Annually Lab Results   Component Value Date    PSA 1.92 02/15/2021     There are no preventive care reminders to display for this patient.   Immunizations    Influenza Covered once per flu season  Please get every year  10/19/2023  No recommendations at this time    Pneumococcal Each vaccine ([id number] & [id number]) covered once after 65 Prevnar 13: 05/12/2016    [id number]: 10/05/2011     No recommendations at this time    Hepatitis B One screening covered for patients with certain risk factors   -  No recommendations at this time    Tetanus Toxoid Not covered by Medicare Part B unless medically necessary (cut with metal); may be covered with your pharmacy prescription benefits -    Tetanus, Diptheria and Pertusis TD and TDaP Not covered by Medicare Part B -  No recommendations at this time    Zoster Not covered by Medicare Part B; may be covered with your pharmacy  prescription benefits 07/05/2013  Zoster Vaccines(2 of 3) due on 08/30/2013     Annual Monitoring of Persistent Medications (ACE/ARB, digoxin diuretics, anticonvulsants)    Potassium Annually Lab Results   Component Value Date    K 4.0 06/01/2023         Creatinine   Annually Lab Results   Component Value Date    CREATSERUM 0.92 06/01/2023         BUN Annually Lab Results   Component Value Date    BUN 21 (H) 06/01/2023       Drug Serum Conc Annually No results found for: \"DIGOXIN\", \"DIG\", \"VALP\"       VACCINES NEEDED:   SHINGRIX  TDAP  COVID BOOSTER  RSV

## 2024-05-30 DIAGNOSIS — E03.9 ACQUIRED HYPOTHYROIDISM: ICD-10-CM

## 2024-05-31 RX ORDER — LEVOTHYROXINE SODIUM 112 UG/1
112 TABLET ORAL
Qty: 90 TABLET | Refills: 0 | Status: SHIPPED | OUTPATIENT
Start: 2024-05-31

## 2024-05-31 NOTE — TELEPHONE ENCOUNTER
Thyroid Medication Protocol [id number]/30/2024 12:06 PM   Protocol Details TSH in past 12 months    Last TSH value is normal    In person appointment or virtual visit in the past 12 mos or appointment in next 3 mos   Acquired hypothyroidism  Continue levothyroxine.   No future appointments.

## 2024-06-05 ENCOUNTER — OFFICE VISIT (OUTPATIENT)
Dept: INTERNAL MEDICINE CLINIC | Facility: CLINIC | Age: 79
End: 2024-06-05
Payer: COMMERCIAL

## 2024-06-05 VITALS
HEART RATE: 64 BPM | WEIGHT: 182 LBS | DIASTOLIC BLOOD PRESSURE: 68 MMHG | OXYGEN SATURATION: 98 % | TEMPERATURE: 97 F | HEIGHT: 69 IN | BODY MASS INDEX: 26.96 KG/M2 | SYSTOLIC BLOOD PRESSURE: 116 MMHG | RESPIRATION RATE: 16 BRPM

## 2024-06-05 DIAGNOSIS — N36.8 BLOODY URETHRAL DISCHARGE: ICD-10-CM

## 2024-06-05 DIAGNOSIS — R31.0 GROSS HEMATURIA: Primary | ICD-10-CM

## 2024-06-05 LAB
APPEARANCE: CLEAR
BILIRUB UR QL STRIP.AUTO: NEGATIVE
BILIRUBIN: NEGATIVE
CLARITY UR REFRACT.AUTO: CLEAR
COLOR UR AUTO: YELLOW
GLUCOSE (URINE DIPSTICK): NEGATIVE MG/DL
GLUCOSE UR STRIP.AUTO-MCNC: NORMAL MG/DL
KETONES (URINE DIPSTICK): NEGATIVE MG/DL
KETONES UR STRIP.AUTO-MCNC: NEGATIVE MG/DL
LEUKOCYTE ESTERASE UR QL STRIP.AUTO: NEGATIVE
LEUKOCYTES: NEGATIVE
MULTISTIX LOT#: NORMAL NUMERIC
NITRITE UR QL STRIP.AUTO: NEGATIVE
NITRITE, URINE: NEGATIVE
OCCULT BLOOD: NEGATIVE
PH UR STRIP.AUTO: 6 [PH] (ref 5–8)
PH, URINE: 6 (ref 4.5–8)
RBC UR QL AUTO: NEGATIVE
SP GR UR STRIP.AUTO: 1.02 (ref 1–1.03)
SPECIFIC GRAVITY: 1.02 (ref 1–1.03)
URINE-COLOR: YELLOW
UROBILINOGEN UR STRIP.AUTO-MCNC: NORMAL MG/DL
UROBILINOGEN,SEMI-QN: 1 MG/DL (ref 0–1.9)

## 2024-06-05 PROCEDURE — 3078F DIAST BP <80 MM HG: CPT | Performed by: NURSE PRACTITIONER

## 2024-06-05 PROCEDURE — 87086 URINE CULTURE/COLONY COUNT: CPT | Performed by: NURSE PRACTITIONER

## 2024-06-05 PROCEDURE — 1159F MED LIST DOCD IN RCRD: CPT | Performed by: NURSE PRACTITIONER

## 2024-06-05 PROCEDURE — 3074F SYST BP LT 130 MM HG: CPT | Performed by: NURSE PRACTITIONER

## 2024-06-05 PROCEDURE — 1170F FXNL STATUS ASSESSED: CPT | Performed by: NURSE PRACTITIONER

## 2024-06-05 PROCEDURE — 99214 OFFICE O/P EST MOD 30 MIN: CPT | Performed by: NURSE PRACTITIONER

## 2024-06-05 PROCEDURE — 3008F BODY MASS INDEX DOCD: CPT | Performed by: NURSE PRACTITIONER

## 2024-06-05 PROCEDURE — G2211 COMPLEX E/M VISIT ADD ON: HCPCS | Performed by: NURSE PRACTITIONER

## 2024-06-05 PROCEDURE — 81003 URINALYSIS AUTO W/O SCOPE: CPT | Performed by: NURSE PRACTITIONER

## 2024-06-05 NOTE — PROGRESS NOTES
`CHIEF COMPLAINT:     Chief Complaint   Patient presents with    Bleeding     Blood in Urine - going for about 5 days now, No Pain, No Itching, No Uriniary issues other than blood in urine    Discharge     Last 4-5 days has been having a Black - Bloody Discharge from his penis, No Pain, No Itching       HPI:   Amilcar Dejesus is a 79 year old male coming in with complaints of blood in the urine and discharge from the penis.    He was here for his annual supervisit on 5/14 and had the prevnar vaccine. Reports his symptoms started the day after with kenroy blood in the urine and black/bloody discharge from the penis which was not ejaculate. His symptoms were intermittent but resolved 2 days ago with no recurrence. He denies any associated dysuria, fevers, chills, pelvic pain, abdominal pain, flank pain, or worsening urinary frequency. He does have history of BPH s/p TURP years ago, does have nocturia. Follows with urology but hasn't seen them in about 2 years. Denies any trauma or injury. He does regularly workout but denies lifting heavy weights. Denies any concern for STDs. He was last sexually active 6-7 years ago, does occasionally masturbate. He is on baby aspirin, no other blood thinners.     Past Medical History:    Actinic keratoses    [facility]    Allergic rhinitis    Anxiety    Arthritis    left thumb    Atherosclerosis of coronary artery    triple-bypass    Depression    Heart attack (HCC)    Heart attack (HCC)    Hyperthyroidism    Hypothyroidism    Lentigines    [facility]    Lower urinary tract symptoms (LUTS)    Multiple benign nevi    [facility]    Other follicular cysts of the skin and subcutaneous tissue    [facility] - Cyst - will monitor lesion on trunk.    Thrombocytopenia (HCC)    Thyroid disease    Visual impairment    glasses      Past Surgical History:   Procedure Laterality Date    Angioplasty (coronary)  03/2015    stents x 2    Cabg  2005     Colonoscopy  2019    Other  2009    prostate surgey    Other  2016    left rotator cuff    Other surgical history  2017    torn rotator cuff    Skin surgery      Tonsillectomy      Vasectomy        Social History:  Social History     Socioeconomic History    Marital status:    Tobacco Use    Smoking status: Former     Current packs/day: 0.00     Average packs/day: 0.5 packs/day for 20.0 years (10.0 ttl pk-yrs)     Types: Cigarettes     Start date: 3/18/1985     Quit date: 3/18/2005     Years since quittin.2     Passive exposure: Past    Smokeless tobacco: Never   Vaping Use    Vaping status: Never Used   Substance and Sexual Activity    Alcohol use: Yes     Alcohol/week: 1.0 standard drink of alcohol     Types: 1 Standard drinks or equivalent per week     Comment: Wine daily 1oz    Drug use: No   Other Topics Concern    Caffeine Concern No    Exercise Yes     Comment: 3 days a week- cardio and weights    Seat Belt Yes    Special Diet No    Stress Concern Yes     Comment: personal    Weight Concern No     Social Determinants of Health      Received from [facility], [facility]    Social Connections    Received from [facility], [facility]    Housing Stability      Family History:  Family History   Problem Relation Age of Onset    Heart Attack Other 92      Allergies:  Allergies   Allergen Reactions    Penicillins RASH      Current Meds:  Current Outpatient Medications   Medication Sig Dispense Refill    levothyroxine 112 MCG Oral Tab Take 1 tablet (112 mcg total) by mouth before breakfast. 90 tablet 0    ketoconazole 2 % External Shampoo Apply 1 Application topically as needed for Itching.      finasteride 5 MG Oral Tab Take 1 tablet (5 mg total) by mouth daily. 30 tablet 0    Sildenafil Citrate (VIAGRA) 100 MG Oral Tab Take 1 tablet (100 mg total) by mouth daily as needed for Erectile Dysfunction. 30 tablet 0     omeprazole 20 MG Oral Capsule Delayed Release Take 1 capsule (20 mg total) by mouth daily. (Patient taking differently: Take 1 capsule (20 mg total) by mouth as needed.) 90 capsule 1    tamsulosin 0.4 MG Oral Cap Take 1 capsule (0.4 mg total) by mouth in the morning and 1 capsule (0.4 mg total) before bedtime.      benzonatate 200 MG Oral Cap Take 1 capsule (200 mg total) by mouth 3 (three) times daily as needed for cough. 21 capsule 0    Simethicone (GAS-X OR) daily as needed.      atorvastatin 80 MG Oral Tab Take 1 tablet (80 mg total) by mouth daily. 90 tablet 3    Losartan Potassium 25 MG Oral Tab Take 1 tablet (25 mg total) by mouth nightly. 90 tablet 1    ezetimibe 10 MG Oral Tab Take 1 tablet (10 mg total) by mouth daily. 90 tablet 1    POTASSIUM OR Take by mouth as needed.        aspirin 81 MG Oral Tab Take 1 tablet (81 mg total) by mouth nightly.      Multiple Vitamins-Minerals (CENTRUM CARDIO OR) Take by mouth daily.         Counseling given: Not Answered       REVIEW OF SYSTEMS:   See HPI.    EXAM:     /68 (BP Location: Left arm, Patient Position: Sitting, Cuff Size: adult)   Pulse 64   Temp 97.3 °F (36.3 °C) (Temporal)   Resp 16   Ht 5' 9\" (1.753 m)   Wt 182 lb (82.6 kg)   SpO2 98%   BMI 26.88 kg/m²   Body mass index is 26.88 kg/m².   Vital signs reviewed. Appears stated age, well groomed, in no acute distress.  Physical Exam:  GENERAL: Patient is alert, awake and oriented, well developed, well nourished.  HEENT: Head: Normocephalic, atraumatic.   HEART: RRR without murmur.  LUNGS: Clear to auscultation bilaterally, no rales/rhonchi/wheezing.  ABDOMEN: good BS's, no masses, HSM or tenderness  : two descended testes, no masses, no hernia, no penile lesions or discharge noted  MUSCULOSKELETAL: No obvious joint deformity or swelling.   EXTREMITIES: No edema, no cyanosis, no clubbing, FROM  NEURO: Oriented time three.    LABS:      Lab Results   Component Value Date    WBC 4.2 05/13/2023     RBC 4.64 05/13/2023    HGB 15.5 05/13/2023    HCT 43.2 05/13/2023    MCV 93.1 05/13/2023    MCH 33.4 05/13/2023    MCHC 35.9 05/13/2023    RDW 12.8 05/13/2023    .0 (L) 05/13/2023      Lab Results   Component Value Date    GLU 89 06/01/2023    BUN 21 (H) 06/01/2023    BUNCREA 19.1 01/02/2021    CREATSERUM 0.92 06/01/2023    ANIONGAP 6 06/01/2023    GFR 79 05/27/2016    GFRNAA 84 01/02/2021    GFRAA 97 01/02/2021    CA 9.1 06/01/2023    OSMOCALC 292 06/01/2023    ALKPHO 56 06/01/2023    AST 20 06/01/2023    ALT 40 06/01/2023    BILT 1.4 06/01/2023    TP 6.6 06/01/2023    ALB 4.0 06/01/2023    GLOBULIN 2.6 (L) 06/01/2023     06/01/2023    K 4.0 06/01/2023     06/01/2023    CO2 25.0 06/01/2023      Lab Results   Component Value Date    CHOLEST 122 04/10/2023    TRIG 58 04/10/2023    HDL 76 (H) 04/10/2023    LDL 33 04/10/2023    VLDL 8 04/10/2023    TCHDLRATIO 1.82 12/02/2015    NONHDLC 46 04/10/2023      Lab Results   Component Value Date    T4F 1.0 11/27/2018    TSH 1.810 01/23/2024      Lab Results   Component Value Date     12/02/2015    A1C 5.3 12/02/2015      Results for orders placed or performed in visit on 06/05/24   URINALYSIS, AUTO, W/O SCOPE   Result Value Ref Range    Glucose Urine Negative Negative mg/dL    Bilirubin Urine Negative Negative    Ketones, UA Negative Negative - Trace mg/dL    Spec Gravity 1.020 1.005 - 1.030    Blood Urine Negative Negative    PH Urine 6.0 5.0 - 8.0    Protein Urine Trace Negative - Trace mg/dL    Urobilinogen Urine 1.0 0.2 - 1.0 mg/dL    Nitrite Urine Negative Negative    Leukocyte Esterase Urine Negative Negative    APPEARANCE CLEAR Clear    Color Urine YELLOW Yellow    Multistix Lot# [id number] Numeric    Multistix Expiration Date 8/31/24 Date       IMAGING:     XR CHEST PA + LAT CHEST (CPT=71046)    Result Date: 5/14/2024  PROCEDURE:  XR CHEST PA + LAT CHEST (CPT=71046)  INDICATIONS:  R93.89 Abnormal CXR  COMPARISON:  [place], XR, XR CHEST  PA + LAT CHEST (CPT=71046), 4/04/2024, 9:58 AM.  TECHNIQUE:  PA and lateral chest radiographs were obtained.  PATIENT STATED HISTORY: (As transcribed by Technologist)  Follow up for interstitial fibrosis.             CONCLUSION:    No developing pneumonia, pleural effusion, pneumothorax, hyperinflation or CHF.  Stable mild cardiac enlargement and tortuous ectatic aorta.  Post sternotomy changes and bypass surgery changes.  Stable mild interstitial fibrotic changes.  LOCATION:  [facility]   Dictated by (CST): Iglesia Mancera MD on 5/14/2024 at 1:24 PM     Finalized by (CST): Iglesia Mancera MD on 5/14/2024 at 1:25 PM          ASSESSMENT AND PLAN:   1. Gross hematuria  2. Bloody urethral discharge  -Symptoms have resolved. Unknown etiology. He is completely asymptomatic now  -UAD as above completely normal, negative for blood  -Will send out for UA and culture  -See urology  - Urinalysis, Routine; Future  - Urine Culture, Routine; Future  - URINALYSIS, AUTO, W/O SCOPE  - UROLOGY - INTERNAL     The patient indicates understanding of these issues and agrees to the plan.  Return if symptoms worsen or fail to improve.    Nunu Du, APRN  6/5/2024

## 2024-07-22 DIAGNOSIS — Z12.5 PROSTATE CANCER SCREENING: ICD-10-CM

## 2024-07-22 DIAGNOSIS — R39.9 LOWER URINARY TRACT SYMPTOMS (LUTS): ICD-10-CM

## 2024-07-22 DIAGNOSIS — N40.0 BENIGN PROSTATIC HYPERPLASIA, UNSPECIFIED WHETHER LOWER URINARY TRACT SYMPTOMS PRESENT: ICD-10-CM

## 2024-07-24 RX ORDER — SILDENAFIL 100 MG/1
100 TABLET, FILM COATED ORAL DAILY PRN
Qty: 12 TABLET | Refills: 0 | Status: SHIPPED | OUTPATIENT
Start: 2024-07-24

## 2024-07-24 RX ORDER — TAMSULOSIN HYDROCHLORIDE 0.4 MG/1
0.4 CAPSULE ORAL 2 TIMES DAILY
Qty: 180 CAPSULE | Refills: 3 | OUTPATIENT
Start: 2024-07-24

## 2024-07-24 NOTE — TELEPHONE ENCOUNTER
Genitourinary Medications [id number]/2024 01:23 PM   Protocol Details Patient does not have pulmonary hypertension on problem list    In person appointment or virtual visit in the past 12 mos or appointment in next 3 mos      No future appointments.

## 2024-07-29 ENCOUNTER — LAB ENCOUNTER (OUTPATIENT)
Dept: LAB | Facility: HOSPITAL | Age: 79
End: 2024-07-29
Attending: INTERNAL MEDICINE
Payer: MEDICARE

## 2024-07-29 DIAGNOSIS — R41.3 MEMORY LOSS: ICD-10-CM

## 2024-07-29 DIAGNOSIS — E78.00 PURE HYPERCHOLESTEROLEMIA: ICD-10-CM

## 2024-07-29 DIAGNOSIS — D69.6 THROMBOCYTOPENIA (HCC): ICD-10-CM

## 2024-07-29 DIAGNOSIS — E03.9 ACQUIRED HYPOTHYROIDISM: ICD-10-CM

## 2024-07-29 LAB
ALBUMIN SERPL-MCNC: 4.3 G/DL (ref 3.2–4.8)
ALBUMIN/GLOB SERPL: 1.9 {RATIO} (ref 1–2)
ALP LIVER SERPL-CCNC: 50 U/L
ALT SERPL-CCNC: 30 U/L
ANION GAP SERPL CALC-SCNC: 4 MMOL/L (ref 0–18)
AST SERPL-CCNC: 21 U/L (ref ?–34)
BASOPHILS # BLD AUTO: 0.02 X10(3) UL (ref 0–0.2)
BASOPHILS NFR BLD AUTO: 0.5 %
BILIRUB SERPL-MCNC: 1.5 MG/DL (ref 0.2–1.1)
BUN BLD-MCNC: 19 MG/DL (ref 9–23)
CALCIUM BLD-MCNC: 8.9 MG/DL (ref 8.7–10.4)
CHLORIDE SERPL-SCNC: 109 MMOL/L (ref 98–112)
CHOLEST SERPL-MCNC: 128 MG/DL (ref ?–200)
CO2 SERPL-SCNC: 29 MMOL/L (ref 21–32)
CREAT BLD-MCNC: 0.89 MG/DL
EGFRCR SERPLBLD CKD-EPI 2021: 87 ML/MIN/1.73M2 (ref 60–?)
EOSINOPHIL # BLD AUTO: 0.04 X10(3) UL (ref 0–0.7)
EOSINOPHIL NFR BLD AUTO: 1 %
ERYTHROCYTE [DISTWIDTH] IN BLOOD BY AUTOMATED COUNT: 12.5 %
FASTING PATIENT LIPID ANSWER: YES
FASTING STATUS PATIENT QL REPORTED: YES
FOLATE SERPL-MCNC: >24 NG/ML (ref 5.4–?)
GLOBULIN PLAS-MCNC: 2.3 G/DL (ref 2–3.5)
GLUCOSE BLD-MCNC: 97 MG/DL (ref 70–99)
HCT VFR BLD AUTO: 43.3 %
HDLC SERPL-MCNC: 65 MG/DL (ref 40–59)
HGB BLD-MCNC: 15.5 G/DL
IMM GRANULOCYTES # BLD AUTO: 0.01 X10(3) UL (ref 0–1)
IMM GRANULOCYTES NFR BLD: 0.3 %
LDLC SERPL CALC-MCNC: 53 MG/DL (ref ?–100)
LYMPHOCYTES # BLD AUTO: 1.32 X10(3) UL (ref 1–4)
LYMPHOCYTES NFR BLD AUTO: 34 %
MCH RBC QN AUTO: 34.1 PG (ref 26–34)
MCHC RBC AUTO-ENTMCNC: 35.8 G/DL (ref 31–37)
MCV RBC AUTO: 95.4 FL
MONOCYTES # BLD AUTO: 0.37 X10(3) UL (ref 0.1–1)
MONOCYTES NFR BLD AUTO: 9.5 %
NEUTROPHILS # BLD AUTO: 2.12 X10 (3) UL (ref 1.5–7.7)
NEUTROPHILS # BLD AUTO: 2.12 X10(3) UL (ref 1.5–7.7)
NEUTROPHILS NFR BLD AUTO: 54.7 %
NONHDLC SERPL-MCNC: 63 MG/DL (ref ?–130)
OSMOLALITY SERPL CALC.SUM OF ELEC: 296 MOSM/KG (ref 275–295)
PLATELET # BLD AUTO: 120 10(3)UL (ref 150–450)
POTASSIUM SERPL-SCNC: 4.2 MMOL/L (ref 3.5–5.1)
PROT SERPL-MCNC: 6.6 G/DL (ref 5.7–8.2)
RBC # BLD AUTO: 4.54 X10(6)UL
SODIUM SERPL-SCNC: 142 MMOL/L (ref 136–145)
TRIGL SERPL-MCNC: 37 MG/DL (ref 30–149)
TSI SER-ACNC: 3.6 MIU/ML (ref 0.55–4.78)
VIT B12 SERPL-MCNC: 402 PG/ML (ref 211–911)
VLDLC SERPL CALC-MCNC: 5 MG/DL (ref 0–30)
WBC # BLD AUTO: 3.9 X10(3) UL (ref 4–11)

## 2024-07-29 PROCEDURE — 82746 ASSAY OF FOLIC ACID SERUM: CPT

## 2024-07-29 PROCEDURE — 84443 ASSAY THYROID STIM HORMONE: CPT

## 2024-07-29 PROCEDURE — 80061 LIPID PANEL: CPT

## 2024-07-29 PROCEDURE — 82607 VITAMIN B-12: CPT

## 2024-07-29 PROCEDURE — 85025 COMPLETE CBC W/AUTO DIFF WBC: CPT

## 2024-07-29 PROCEDURE — 36415 COLL VENOUS BLD VENIPUNCTURE: CPT

## 2024-07-29 PROCEDURE — 80053 COMPREHEN METABOLIC PANEL: CPT

## 2024-07-30 ENCOUNTER — TELEPHONE (OUTPATIENT)
Dept: INTERNAL MEDICINE CLINIC | Facility: CLINIC | Age: 79
End: 2024-07-30

## 2024-07-30 DIAGNOSIS — I25.10 CORONARY ARTERY DISEASE INVOLVING NATIVE CORONARY ARTERY OF NATIVE HEART WITHOUT ANGINA PECTORIS: Primary | ICD-10-CM

## 2024-07-30 DIAGNOSIS — R17 ELEVATED BILIRUBIN: Primary | ICD-10-CM

## 2024-07-30 NOTE — TELEPHONE ENCOUNTER
Insurance: [facility]  Provider's Name?: Randall Leiva   Provider's Specialty?: Cardiologist    Reason for Visit?: Annual Exam   Diagnosis?: History heart issues   Number of Visits Requested?: 3    Last Visit with Specialist?: 04/2023   Is Appt. Already Scheduled?: Yes        If so, Date?:  7/31/24  Once referral is approved pt can see referral via [employer]    Please fax to [phone]. Please call patient completed

## 2024-07-30 NOTE — TELEPHONE ENCOUNTER
Referral authorized and faxed to [facility], fax confirmed. LM advising pt that referral is authorized and faxed.

## 2024-08-08 NOTE — PROGRESS NOTES
[facility]  Follow Up Visit    HPI:   Amilcar Dejesus is a 79 year old male here today for BPH, OAB, and ED. Last saw Dr Zavaleta on 12/27/2022.     Patient states his symptoms are currently nocturia every two hours. He is taking Tamsulosin 0.4 mg BID, Finasteride 5 mg every day, and Sildenafil 100 mg PRN. He was given a referral for PFT at previous visit for OAB symptoms and did not schedule an appointment.  Drinks less than 15 ounces water per day, 6 cups of coffee per day. Stops drinking coffee around 4pm. Stops drinking at bed time, goes to bed around 11 pm.  PVR 0 mL.    Patient also saw primary on 06/05/2024 for gross hematuria x 1. UA with reflex performed that day showed no evidence of blood. Takes baby aspirin daily, no other blood thinners. Quit smoking tobacco in 2005, 10 pack years. No history of chemical exposure. No unexplained weight loss or bone pain. No family history of  malignancy.     Past Medical History:    Actinic keratoses    [facility]    Allergic rhinitis    Anxiety    Arthritis    left thumb    Atherosclerosis of coronary artery    triple-bypass    Depression    Heart attack (HCC)    Heart attack (HCC)    Hyperthyroidism    Hypothyroidism    Lentigines    [facility]    Lower urinary tract symptoms (LUTS)    Multiple benign nevi    [facility]    Other follicular cysts of the skin and subcutaneous tissue    [facility] - Cyst - will monitor lesion on trunk.    Thrombocytopenia (HCC)    Thyroid disease    Visual impairment    glasses     Past Surgical History:   Procedure Laterality Date    Angioplasty (coronary)  03/2015    stents x 2    Cabg  2005    Colonoscopy  2019    Other  2009    prostate surgey    Other  03/16/2016    left rotator cuff    Other surgical history  2017    torn rotator cuff    Skin surgery      Tonsillectomy      Vasectomy       Family History   Problem Relation Age of Onset    Heart Attack Other 92     Social History      Socioeconomic History    Marital status:    Tobacco Use    Smoking status: Former     Current packs/day: 0.00     Average packs/day: 0.5 packs/day for 20.0 years (10.0 ttl pk-yrs)     Types: Cigarettes     Start date: 3/18/1985     Quit date: 3/18/2005     Years since quittin.4     Passive exposure: Past    Smokeless tobacco: Never   Vaping Use    Vaping status: Never Used   Substance and Sexual Activity    Alcohol use: Yes     Alcohol/week: 1.0 standard drink of alcohol     Types: 1 Standard drinks or equivalent per week     Comment: Wine daily 1oz    Drug use: No   Other Topics Concern    Caffeine Concern No    Exercise Yes     Comment: 3 days a week- cardio and weights    Seat Belt Yes    Special Diet No    Stress Concern Yes     Comment: personal    Weight Concern No     Social Determinants of Health      Received [facility], [facility]    Social Connections    Received from [facility], [facility]    Housing Stability     Current Outpatient Medications   Medication Sig Dispense Refill    SILDENAFIL CITRATE 100 MG Oral Tab TAKE 1 TABLET BY MOUTH DAILY AS  NEEDED FOR ERECTILE DYSFUNCTION 12 tablet 0    levothyroxine 112 MCG Oral Tab Take 1 tablet (112 mcg total) by mouth before breakfast. 90 tablet 0    ketoconazole 2 % External Shampoo Apply 1 Application topically as needed for Itching.      finasteride 5 MG Oral Tab Take 1 tablet (5 mg total) by mouth daily. 30 tablet 0    omeprazole 20 MG Oral Capsule Delayed Release Take 1 capsule (20 mg total) by mouth daily. (Patient taking differently: Take 1 capsule (20 mg total) by mouth as needed.) 90 capsule 1    tamsulosin 0.4 MG Oral Cap Take 1 capsule (0.4 mg total) by mouth in the morning and 1 capsule (0.4 mg total) before bedtime.      benzonatate 200 MG Oral Cap Take 1 capsule (200 mg total) by mouth 3 (three) times daily as needed for cough. 21 capsule 0     Simethicone (GAS-X OR) daily as needed.      atorvastatin 80 MG Oral Tab Take 1 tablet (80 mg total) by mouth daily. 90 tablet 3    Losartan Potassium 25 MG Oral Tab Take 1 tablet (25 mg total) by mouth nightly. 90 tablet 1    ezetimibe 10 MG Oral Tab Take 1 tablet (10 mg total) by mouth daily. 90 tablet 1    POTASSIUM OR Take by mouth as needed.        aspirin 81 MG Oral Tab Take 1 tablet (81 mg total) by mouth nightly.      Multiple Vitamins-Minerals (CENTRUM CARDIO OR) Take by mouth daily.         Allergies: Penicillins    REVIEW OF SYSTEMS:  Review of Systems   All other systems reviewed and are negative.        EXAM:  There were no vitals taken for this visit.    Physical Exam  Constitutional:       Appearance: Normal appearance.   HENT:      Head: Normocephalic and atraumatic.      Mouth/Throat:      Mouth: Mucous membranes are moist.   Abdominal:      General: Abdomen is flat.      Palpations: Abdomen is soft.   Skin:     General: Skin is warm and dry.   Neurological:      Mental Status: He is alert and oriented to person, place, and time.   Psychiatric:         Mood and Affect: Mood normal.         Behavior: Behavior normal.         Thought Content: Thought content normal.         Judgment: Judgment normal.          LABS:  Lab Results   Component Value Date    PSA 1.92 02/15/2021     Lab Results   Component Value Date    WBC 3.9 (L) 07/29/2024    RBC 4.54 07/29/2024    HGB 15.5 07/29/2024    HCT 43.3 07/29/2024    MCV 95.4 07/29/2024    MCH 34.1 (H) 07/29/2024    MCHC 35.8 07/29/2024    RDW 12.5 07/29/2024    .0 (L) 07/29/2024     Lab Results   Component Value Date    GLU 97 07/29/2024    BUN 19 07/29/2024    BUNCREA 19.1 01/02/2021    CREATSERUM 0.89 07/29/2024    ANIONGAP 4 07/29/2024    GFR 79 05/27/2016    GFRNAA 84 01/02/2021    GFRAA 97 01/02/2021    CA 8.9 07/29/2024     07/29/2024    K 4.2 07/29/2024     07/29/2024    CO2 29.0 07/29/2024     Lab Results   Component Value Date     PTP 13.6 05/27/2016    INR 1.01 05/27/2016       IMAGING:  No results found.    IMPRESSION:  BPH with LUTS  OAB  Gross Hematuria    PLAN:  - Continue Tamsulosin 0.4 mg BID and Finasteride 5 mg every day  - Bladder behaviors, including decreasing coffee intake and limiting fluids before bed  - UA  - CT Urogram and Office Cystoscopy to evaluate for gross hematuria    LITA Borja  08/12/2024

## 2024-08-12 ENCOUNTER — OFFICE VISIT (OUTPATIENT)
Dept: SURGERY | Facility: CLINIC | Age: 79
End: 2024-08-12
Payer: COMMERCIAL

## 2024-08-12 DIAGNOSIS — R31.0 GROSS HEMATURIA: ICD-10-CM

## 2024-08-12 DIAGNOSIS — N13.8 BPH WITH OBSTRUCTION/LOWER URINARY TRACT SYMPTOMS: Primary | ICD-10-CM

## 2024-08-12 DIAGNOSIS — R35.1 NOCTURIA: ICD-10-CM

## 2024-08-12 DIAGNOSIS — N40.1 BPH WITH OBSTRUCTION/LOWER URINARY TRACT SYMPTOMS: Primary | ICD-10-CM

## 2024-08-12 NOTE — PATIENT INSTRUCTIONS
Behavioral management includes timed voiding (going to the bathroom on a schedule every 1-4 hours), double voiding (trying to pee twice), avoiding bladder irritants (coffee, tea, soda, pop, alcohol), compression stockings, elevation of feet, voiding prior to bedtime, avoiding fluids 3 to 4 hours before bedtime and constipation management.   Call [phone] to schedule CT

## 2024-08-14 ENCOUNTER — TELEPHONE (OUTPATIENT)
Dept: HEMATOLOGY/ONCOLOGY | Facility: HOSPITAL | Age: 79
End: 2024-08-14

## 2024-08-14 NOTE — TELEPHONE ENCOUNTER
New Consult for   Night sweats [R61]   Referred by Dr. ALEJO Matias to see Dr. JON Maciel. Called 8/14/24

## 2024-08-15 ENCOUNTER — OFFICE VISIT (OUTPATIENT)
Dept: INTERNAL MEDICINE CLINIC | Facility: CLINIC | Age: 79
End: 2024-08-15
Payer: COMMERCIAL

## 2024-08-15 VITALS
BODY MASS INDEX: 27.55 KG/M2 | HEART RATE: 50 BPM | SYSTOLIC BLOOD PRESSURE: 130 MMHG | TEMPERATURE: 98 F | HEIGHT: 69 IN | RESPIRATION RATE: 16 BRPM | WEIGHT: 186 LBS | DIASTOLIC BLOOD PRESSURE: 70 MMHG | OXYGEN SATURATION: 96 %

## 2024-08-15 DIAGNOSIS — H90.3 SENSORINEURAL HEARING LOSS (SNHL) OF BOTH EARS: Primary | ICD-10-CM

## 2024-08-15 DIAGNOSIS — H93.13 TINNITUS OF BOTH EARS: ICD-10-CM

## 2024-08-15 PROCEDURE — 3008F BODY MASS INDEX DOCD: CPT | Performed by: NURSE PRACTITIONER

## 2024-08-15 PROCEDURE — 1159F MED LIST DOCD IN RCRD: CPT | Performed by: NURSE PRACTITIONER

## 2024-08-15 PROCEDURE — 1170F FXNL STATUS ASSESSED: CPT | Performed by: NURSE PRACTITIONER

## 2024-08-15 PROCEDURE — 3075F SYST BP GE 130 - 139MM HG: CPT | Performed by: NURSE PRACTITIONER

## 2024-08-15 PROCEDURE — G2211 COMPLEX E/M VISIT ADD ON: HCPCS | Performed by: NURSE PRACTITIONER

## 2024-08-15 PROCEDURE — 3078F DIAST BP <80 MM HG: CPT | Performed by: NURSE PRACTITIONER

## 2024-08-15 PROCEDURE — 99213 OFFICE O/P EST LOW 20 MIN: CPT | Performed by: NURSE PRACTITIONER

## 2024-08-15 NOTE — PROGRESS NOTES
CHIEF COMPLAINT:     Chief Complaint   Patient presents with    Hearing Problem     Having Trouble Hearing out of Both Ears and would like a Referral to a Specialist       HPI:   Amilcar Dejesus is a 79 year old male coming in with complaints of decreased hearing from both ears that is gradually getting worse over the years. Does have ringing in both ears. Denies any pain. Reports he saw ENT or audiologist 5+ years ago and he had mild hearing loss which did not warrant the need for hearing aids at the time.    Past Medical History:    Actinic keratoses    [facility]    Allergic rhinitis    Anxiety    Arthritis    left thumb    Atherosclerosis of coronary artery    triple-bypass    Depression    Heart attack (HCC)    Heart attack (HCC)    Hyperthyroidism    Hypothyroidism    Lentigines    [facility]    Lower urinary tract symptoms (LUTS)    Multiple benign nevi    [facility]    Other follicular cysts of the skin and subcutaneous tissue    [facility] - Cyst - will monitor lesion on trunk.    Thrombocytopenia (HCC)    Thyroid disease    Visual impairment    glasses      Past Surgical History:   Procedure Laterality Date    Angioplasty (coronary)  2015    stents x 2    Cabg  2005    Colonoscopy  2019    Other  2009    prostate surgey    Other  2016    left rotator cuff    Other surgical history  2017    torn rotator cuff    Skin surgery      Tonsillectomy      Vasectomy        Social History:  Social History     Socioeconomic History    Marital status:    Tobacco Use    Smoking status: Former     Current packs/day: 0.00     Average packs/day: 0.5 packs/day for 20.0 years (10.0 ttl pk-yrs)     Types: Cigarettes     Start date: 3/18/1985     Quit date: 3/18/2005     Years since quittin.4     Passive exposure: Past    Smokeless tobacco: Never   Vaping Use    Vaping status: Never Used   Substance and Sexual Activity    Alcohol use: Yes     Alcohol/week: 1.0  standard drink of alcohol     Types: 1 Standard drinks or equivalent per week     Comment: Wine daily 1oz    Drug use: No   Other Topics Concern    Caffeine Concern No    Exercise Yes     Comment: 3 days a week- cardio and weights    Seat Belt Yes    Special Diet No    Stress Concern Yes     Comment: personal    Weight Concern No     Social Determinants of Health      Received [facility], [facility]    Social Connections    Received from [facility], [facility]    Housing Stability      Family History:  Family History   Problem Relation Age of Onset    Heart Attack Other 92      Allergies:  Allergies   Allergen Reactions    Penicillins RASH      Current Meds:  Current Outpatient Medications   Medication Sig Dispense Refill    SILDENAFIL CITRATE 100 MG Oral Tab TAKE 1 TABLET BY MOUTH DAILY AS  NEEDED FOR ERECTILE DYSFUNCTION 12 tablet 0    levothyroxine 112 MCG Oral Tab Take 1 tablet (112 mcg total) by mouth before breakfast. 90 tablet 0    ketoconazole 2 % External Shampoo Apply 1 Application topically as needed for Itching.      finasteride 5 MG Oral Tab Take 1 tablet (5 mg total) by mouth daily. 30 tablet 0    omeprazole 20 MG Oral Capsule Delayed Release Take 1 capsule (20 mg total) by mouth daily. (Patient taking differently: Take 1 capsule (20 mg total) by mouth as needed.) 90 capsule 1    tamsulosin 0.4 MG Oral Cap Take 1 capsule (0.4 mg total) by mouth in the morning and 1 capsule (0.4 mg total) before bedtime.      Simethicone (GAS-X OR) daily as needed.      atorvastatin 80 MG Oral Tab Take 1 tablet (80 mg total) by mouth daily. 90 tablet 3    Losartan Potassium 25 MG Oral Tab Take 1 tablet (25 mg total) by mouth nightly. 90 tablet 1    ezetimibe 10 MG Oral Tab Take 1 tablet (10 mg total) by mouth daily. 90 tablet 1    POTASSIUM OR Take by mouth as needed. Potassium Over The Counter daily      aspirin 81 MG Oral Tab Take 1  tablet (81 mg total) by mouth nightly.      Multiple Vitamins-Minerals (CENTRUM CARDIO OR) Take by mouth daily.         Counseling given: Not Answered       REVIEW OF SYSTEMS:   See HPI.    EXAM:     /70 (BP Location: Left arm, Patient Position: Sitting, Cuff Size: adult)   Pulse 50   Temp 97.5 °F (36.4 °C) (Temporal)   Resp 16   Ht 5' 9\" (1.753 m)   Wt 186 lb (84.4 kg)   SpO2 96%   BMI 27.47 kg/m²   Body mass index is 27.47 kg/m².   Vital signs reviewed. Appears stated age, well groomed, in no acute distress.  Physical Exam:  GENERAL: Patient is alert, awake and oriented, well developed, well nourished.  HEENT: Head: Normocephalic, atraumatic. Ears: External normal, TM clear bilaterally. Nose: patent, no nasal discharge.   NECK: Supple, no CLAD  MUSCULOSKELETAL: No obvious joint deformity or swelling.   EXTREMITIES: No edema, no cyanosis, no clubbing  NEURO: Oriented time three.     LABS:      Lab Results   Component Value Date    WBC 3.9 (L) 07/29/2024    RBC 4.54 07/29/2024    HGB 15.5 07/29/2024    HCT 43.3 07/29/2024    MCV 95.4 07/29/2024    MCH 34.1 (H) 07/29/2024    MCHC 35.8 07/29/2024    RDW 12.5 07/29/2024    .0 (L) 07/29/2024      Lab Results   Component Value Date    GLU 97 07/29/2024    BUN 19 07/29/2024    BUNCREA 19.1 01/02/2021    CREATSERUM 0.89 07/29/2024    ANIONGAP 4 07/29/2024    GFR 79 05/27/2016    GFRNAA 84 01/02/2021    GFRAA 97 01/02/2021    CA 8.9 07/29/2024    OSMOCALC 296 (H) 07/29/2024    ALKPHO 50 07/29/2024    AST 21 07/29/2024    ALT 30 07/29/2024    BILT 1.5 (H) 07/29/2024    TP 6.6 07/29/2024    ALB 4.3 07/29/2024    GLOBULIN 2.3 07/29/2024     07/29/2024    K 4.2 07/29/2024     07/29/2024    CO2 29.0 07/29/2024      Lab Results   Component Value Date    CHOLEST 128 07/29/2024    TRIG 37 07/29/2024    HDL 65 (H) 07/29/2024    LDL 53 07/29/2024    VLDL 5 07/29/2024    TCHDLRATIO 1.82 12/02/2015    NONHDLC 63 07/29/2024      Lab Results   Component  Value Date    T4F 1.0 11/27/2018    TSH 3.598 07/29/2024      Lab Results   Component Value Date     12/02/2015    A1C 5.3 12/02/2015        IMAGING:     No results found.     ASSESSMENT AND PLAN:   1. Sensorineural hearing loss (SNHL) of both ears  2. Tinnitus of both ears  -See ENT and will need to see audiology as well  - ENT - INTERNAL     The patient indicates understanding of these issues and agrees to the plan.  Return for as needed or when due for routine care.    Nunu Du, APRN  8/15/2024

## 2024-08-26 ENCOUNTER — OFFICE VISIT (OUTPATIENT)
Dept: HEMATOLOGY/ONCOLOGY | Facility: HOSPITAL | Age: 79
End: 2024-08-26
Attending: STUDENT IN AN ORGANIZED HEALTH CARE EDUCATION/TRAINING PROGRAM
Payer: MEDICARE

## 2024-08-26 VITALS
HEART RATE: 53 BPM | DIASTOLIC BLOOD PRESSURE: 67 MMHG | RESPIRATION RATE: 16 BRPM | OXYGEN SATURATION: 95 % | SYSTOLIC BLOOD PRESSURE: 135 MMHG

## 2024-08-26 DIAGNOSIS — E03.9 ACQUIRED HYPOTHYROIDISM: ICD-10-CM

## 2024-08-26 DIAGNOSIS — R61 NIGHT SWEATS: Primary | ICD-10-CM

## 2024-08-26 DIAGNOSIS — D69.6 THROMBOCYTOPENIA (HCC): ICD-10-CM

## 2024-08-26 LAB
ERYTHROCYTE [SEDIMENTATION RATE] IN BLOOD: 3 MM/HR
IGA SERPL-MCNC: 203.9 MG/DL (ref 40–350)
IGM SERPL-MCNC: 73.4 MG/DL (ref 50–300)
IMMUNOGLOBULIN PNL SER-MCNC: 923 MG/DL (ref 650–1600)
LDH SERPL L TO P-CCNC: 192 U/L

## 2024-08-26 PROCEDURE — 83521 IG LIGHT CHAINS FREE EACH: CPT | Performed by: STUDENT IN AN ORGANIZED HEALTH CARE EDUCATION/TRAINING PROGRAM

## 2024-08-26 PROCEDURE — 84165 PROTEIN E-PHORESIS SERUM: CPT | Performed by: STUDENT IN AN ORGANIZED HEALTH CARE EDUCATION/TRAINING PROGRAM

## 2024-08-26 PROCEDURE — 85652 RBC SED RATE AUTOMATED: CPT | Performed by: STUDENT IN AN ORGANIZED HEALTH CARE EDUCATION/TRAINING PROGRAM

## 2024-08-26 PROCEDURE — 99211 OFF/OP EST MAY X REQ PHY/QHP: CPT

## 2024-08-26 PROCEDURE — 83615 LACTATE (LD) (LDH) ENZYME: CPT | Performed by: STUDENT IN AN ORGANIZED HEALTH CARE EDUCATION/TRAINING PROGRAM

## 2024-08-26 PROCEDURE — 82784 ASSAY IGA/IGD/IGG/IGM EACH: CPT | Performed by: STUDENT IN AN ORGANIZED HEALTH CARE EDUCATION/TRAINING PROGRAM

## 2024-08-26 PROCEDURE — 86334 IMMUNOFIX E-PHORESIS SERUM: CPT | Performed by: STUDENT IN AN ORGANIZED HEALTH CARE EDUCATION/TRAINING PROGRAM

## 2024-08-26 PROCEDURE — 36415 COLL VENOUS BLD VENIPUNCTURE: CPT

## 2024-08-26 NOTE — PROGRESS NOTES
Pt here for new consult regarding night sweats. Pt states these have been going on for the last 8-12 months. Reports he has to change his shirt 1-2 times a night. Sometimes his shirt is damp, sometimes it is fully soaked. Medications and medical history reviewed. Pt states he drinks a glass of wine every night. Reports no appetite changes, weight loss, fevers, or swollen lymph nodes.        Education Record    Learner:  Patient    Disease / Diagnosis: night sweats    Barriers / Limitations:  None   Comments:    Method:  Discussion   Comments:    General Topics:  Medication, Pain, Side effects and symptom management, and Plan of care reviewed   Comments:    Outcome:  Observed demonstration and Shows understanding   Comments:

## 2024-08-26 NOTE — PROGRESS NOTES
Hematology/Oncology Initial Consultation Note    Patient Name: Amilcar Dejesus  Medical Record Number: [id number]    YOB: 1945   Date of Consultation: 8/26/2024   PCP: Lyn Matias MD   Other providers:      Reason for Consultation:  Amilcar Dejesus was seen today for the diagnosis of right sweats    History of Present Illness:    29-year-old male with history of coronary artery disease, right disorder presents to the clinic to establish care for that have been ongoing for over the last couple of years.   Patient said he wakes up at night and changes it is close at least couple of times.  It has been present almost every night.      Patient denies having any fevers, weight changes or appetite changes.  Denies feeling any lumps or bumps.  No skin infections, no joint swelling.  Denies having any chronic infections.  Denies having any recent heart surgeries.  No history of travel.  No sick contacts.  No Tuberculosis, autoimmune condition or inflammatory disease.    Has had a CT of his chest abdomen pelvis from 2023 July which shows no concern for any malignancy.  Patient was also had cardiogram that did not show any vegetative lesions.      Drinks 1 to 2 glasses of red wine every night.  Patient has mentioned that he has tried to decrease his intake.  And does notice that the dampness in his night sweats have decreased.        Past Medical History:  Past Medical History:    Actinic keratoses    [facility]    Allergic rhinitis    Anxiety    Arthritis    left thumb    Atherosclerosis of coronary artery    triple-bypass    Depression    Heart attack (HCC)    Heart attack (HCC)    Hyperthyroidism    Hypothyroidism    Lentigines    [facility]    Lower urinary tract symptoms (LUTS)    Multiple benign nevi    [facility]    Other follicular cysts of the skin and subcutaneous tissue    [facility] - Cyst - will monitor lesion on trunk.    Thrombocytopenia (HCC)     Thyroid disease    Visual impairment    glasses       Past Surgical History:   Procedure Laterality Date    Angioplasty (coronary)  03/2015    stents x 2    Cabg  2005    Colonoscopy  2019    Other  2009    prostate surgey    Other  03/16/2016    left rotator cuff    Other surgical history  2017    torn rotator cuff    Skin surgery      Tonsillectomy      Vasectomy         Home Medications:   SILDENAFIL CITRATE 100 MG Oral Tab TAKE 1 TABLET BY MOUTH DAILY AS  NEEDED FOR ERECTILE DYSFUNCTION 12 tablet 0    levothyroxine 112 MCG Oral Tab Take 1 tablet (112 mcg total) by mouth before breakfast. 90 tablet 0    ketoconazole 2 % External Shampoo Apply 1 Application topically as needed for Itching.      finasteride 5 MG Oral Tab Take 1 tablet (5 mg total) by mouth daily. 30 tablet 0    omeprazole 20 MG Oral Capsule Delayed Release Take 1 capsule (20 mg total) by mouth daily. (Patient taking differently: Take 1 capsule (20 mg total) by mouth as needed.) 90 capsule 1    tamsulosin 0.4 MG Oral Cap Take 1 capsule (0.4 mg total) by mouth in the morning and 1 capsule (0.4 mg total) before bedtime.      Simethicone (GAS-X OR) daily as needed.      atorvastatin 80 MG Oral Tab Take 1 tablet (80 mg total) by mouth daily. 90 tablet 3    Losartan Potassium 25 MG Oral Tab Take 1 tablet (25 mg total) by mouth nightly. 90 tablet 1    ezetimibe 10 MG Oral Tab Take 1 tablet (10 mg total) by mouth daily. 90 tablet 1    POTASSIUM OR Take by mouth as needed. Potassium Over The Counter daily      aspirin 81 MG Oral Tab Take 1 tablet (81 mg total) by mouth nightly.      Multiple Vitamins-Minerals (CENTRUM CARDIO OR) Take by mouth daily.       -------  Current Outpatient Medications on File Prior to Visit   Medication Sig Dispense Refill    SILDENAFIL CITRATE 100 MG Oral Tab TAKE 1 TABLET BY MOUTH DAILY AS  NEEDED FOR ERECTILE DYSFUNCTION 12 tablet 0    levothyroxine 112 MCG Oral Tab Take 1 tablet (112 mcg total) by mouth before breakfast. 90  tablet 0    ketoconazole 2 % External Shampoo Apply 1 Application topically as needed for Itching.      finasteride 5 MG Oral Tab Take 1 tablet (5 mg total) by mouth daily. 30 tablet 0    omeprazole 20 MG Oral Capsule Delayed Release Take 1 capsule (20 mg total) by mouth daily. (Patient taking differently: Take 1 capsule (20 mg total) by mouth as needed.) 90 capsule 1    tamsulosin 0.4 MG Oral Cap Take 1 capsule (0.4 mg total) by mouth in the morning and 1 capsule (0.4 mg total) before bedtime.      Simethicone (GAS-X OR) daily as needed.      atorvastatin 80 MG Oral Tab Take 1 tablet (80 mg total) by mouth daily. 90 tablet 3    Losartan Potassium 25 MG Oral Tab Take 1 tablet (25 mg total) by mouth nightly. 90 tablet 1    ezetimibe 10 MG Oral Tab Take 1 tablet (10 mg total) by mouth daily. 90 tablet 1    POTASSIUM OR Take by mouth as needed. Potassium Over The Counter daily      aspirin 81 MG Oral Tab Take 1 tablet (81 mg total) by mouth nightly.      Multiple Vitamins-Minerals (CENTRUM CARDIO OR) Take by mouth daily.       No current facility-administered medications on file prior to visit.       Allergies:   Allergies   Allergen Reactions    Penicillins RASH       Psychosocial History:  Social History     Social History Narrative    Not on file     Social History     Socioeconomic History    Marital status:    Tobacco Use    Smoking status: Former     Current packs/day: 0.00     Average packs/day: 0.5 packs/day for 20.0 years (10.0 ttl pk-yrs)     Types: Cigarettes     Start date: 3/18/1985     Quit date: 3/18/2005     Years since quittin.4     Passive exposure: Past    Smokeless tobacco: Never   Vaping Use    Vaping status: Never Used   Substance and Sexual Activity    Alcohol use: Not Currently     Alcohol/week: 1.0 standard drink of alcohol     Types: 1 Glasses of wine per week     Comment: Wine daily 1oz    Drug use: No   Other Topics Concern    Caffeine Concern No    Exercise Yes     Comment: 3  days a week- cardio and weights    Seat Belt Yes    Special Diet No    Stress Concern Yes     Comment: personal    Weight Concern No     Social Determinants of Health      [facility]    Social Connections    [facility], [facility]    Housing Stability       Family Medical History:  Family History   Problem Relation Age of Onset    Heart Attack Other 92       Review of Systems:  A 10-point ROS was done with pertinent positives and negative per the HPI    Vital Signs:  Height: --  Weight: --  BSA (Calculated - sq m): --  Pulse: 53 (08/26 1135)  BP: 135/67 (08/26 1135)  Temp: --  Do Not Use - Resp Rate: --  SpO2: 95 % (08/26 1135)    Wt Readings from Last 6 Encounters:   08/15/24 84.4 kg (186 lb)   06/05/24 82.6 kg (182 lb)   05/14/24 82.2 kg (181 lb 4.8 oz)   04/04/24 83.3 kg (183 lb 9.6 oz)   12/11/23 82.1 kg (181 lb)   12/06/23 83.9 kg (185 lb)           Physical Examination:  General: Patient is alert and oriented, not in acute distress  Psych: Mood and affect are appropriate  Eyes: EOMI, PERRL  ENT: Oropharynx is clear, no adenopathy  CV: Regular rate and rhythm, normal S1S2, no murmurs, no LE edema  Respiratory: Lungs clear to auscultation bilaterally  GI/Abd: Soft, non-tender with normoactive bowel sounds, no hepatosplenomegaly  Neurological: Grossly intact   Lymphatics: No palpable cervical, supraclavicular, axillary, or inguinal lymphadenopathy  Skin: no rashes or petechiae      Laboratory:  Lab Results   Component Value Date    WBC 3.9 (L) 07/29/2024    WBC 4.2 05/13/2023    WBC 4.7 08/23/2022    HGB 15.5 07/29/2024    HGB 15.5 05/13/2023    HGB 15.6 08/23/2022    HCT 43.3 07/29/2024    MCV 95.4 07/29/2024    MCH 34.1 (H) 07/29/2024    MCHC 35.8 07/29/2024    RDW 12.5 07/29/2024    .0 (L) 07/29/2024    .0 (L) 05/13/2023    .0 (L) 08/23/2022     Lab Results   Component  Value Date    GLU 97 07/29/2024    BUN 19 07/29/2024    BUNCREA 19.1 01/02/2021    CREATSERUM 0.89 07/29/2024    CREATSERUM 0.92 06/01/2023    CREATSERUM 0.86 04/10/2023    ANIONGAP 4 07/29/2024    GFR 79 05/27/2016    GFRNAA 84 01/02/2021    GFRAA 97 01/02/2021    CA 8.9 07/29/2024    OSMOCALC 296 (H) 07/29/2024    ALKPHO 50 07/29/2024    AST 21 07/29/2024    ALT 30 07/29/2024    BILT 1.5 (H) 07/29/2024    TP 6.6 07/29/2024    ALB 4.3 07/29/2024    GLOBULIN 2.3 07/29/2024     07/29/2024    K 4.2 07/29/2024     07/29/2024    CO2 29.0 07/29/2024     Lab Results   Component Value Date    PTT 27.0 05/27/2016    INR 1.01 05/27/2016       Imaging:        CT chest abdomen pelvis from July 2023 was reviewed, no lymphadenopathy noted.    Impression & Plan:   79-year-old male with persistent night sweats of unknown etiology.    Patient does not have any concerning fevers including fevers, weight loss, appetite changes.  No localizing symptoms.  His symptoms have not changed in the last couple of years.  CT of the chest abdomen pelvis from 2023 is unrevealing.      Extensive workup including blood cultures, QuantiFERON, C-reactive protein urinalysis have been negative    LDH is normal , normal sedimentary rate.  His protein electrophoresis is under process.TSH normal.    Discussed with patient that we will continue to monitor since he notices mild improvement after decreasing his alcohol intake.    To have a CT urogram given his history of hematuria.    Leukopenia and thrombocytopenia -, chronic, stable.  His vitamin B12, folate levels were normal.    Thank you for the consultation.  Patient to call us back if he has any worsening of his symptoms.      Mi Still MD  Hematology/Medical Oncology

## 2024-08-27 ENCOUNTER — HOSPITAL ENCOUNTER (OUTPATIENT)
Dept: CT IMAGING | Facility: HOSPITAL | Age: 79
Discharge: HOME | End: 2024-08-27
Payer: MEDICARE

## 2024-08-27 DIAGNOSIS — R31.0 GROSS HEMATURIA: ICD-10-CM

## 2024-08-27 LAB
ALBUMIN SERPL ELPH-MCNC: 4.09 G/DL (ref 3.75–5.21)
ALBUMIN/GLOB SERPL: 1.7 {RATIO} (ref 1–2)
ALPHA1 GLOB SERPL ELPH-MCNC: 0.23 G/DL (ref 0.19–0.46)
ALPHA2 GLOB SERPL ELPH-MCNC: 0.59 G/DL (ref 0.48–1.05)
B-GLOBULIN SERPL ELPH-MCNC: 0.66 G/DL (ref 0.68–1.23)
GAMMA GLOB SERPL ELPH-MCNC: 0.94 G/DL (ref 0.62–1.7)
KAPPA LC FREE SER-MCNC: 2.05 MG/DL (ref 0.33–1.94)
KAPPA LC FREE/LAMBDA FREE SER NEPH: 1.38 {RATIO} (ref 0.26–1.65)
LAMBDA LC FREE SERPL-MCNC: 1.49 MG/DL (ref 0.57–2.63)
PROT SERPL-MCNC: 6.5 G/DL (ref 5.7–8.2)

## 2024-08-27 PROCEDURE — 74178 CT ABD&PLV WO CNTR FLWD CNTR: CPT

## 2024-08-27 RX ORDER — LEVOTHYROXINE SODIUM 112 UG/1
112 TABLET ORAL
Qty: 90 TABLET | Refills: 0 | Status: SHIPPED | OUTPATIENT
Start: 2024-08-27

## 2024-08-27 NOTE — TELEPHONE ENCOUNTER
8. Acquired hypothyroidism  Continue levothyroxine.     Return in about 6 months (around 11/14/2024) for med check     Requested Prescriptions     Signed Prescriptions Disp Refills    LEVOTHYROXINE 112 MCG Oral Tab 90 tablet 0     Sig: TAKE 1 TABLET BY MOUTH BEFORE BREAKFAST.     Authorizing Provider: TOYIN YEAGER     Ordering User: ALYSSA PHIPPS        Refilled per protocol/OV notes

## 2024-08-30 ENCOUNTER — TELEPHONE (OUTPATIENT)
Dept: SURGERY | Facility: CLINIC | Age: 79
End: 2024-08-30

## 2024-08-30 RX ORDER — TAMSULOSIN HYDROCHLORIDE 0.4 MG/1
0.4 CAPSULE ORAL 2 TIMES DAILY
Qty: 180 CAPSULE | Refills: 1 | Status: SHIPPED | OUTPATIENT
Start: 2024-08-30 | End: 2024-11-28

## 2024-08-30 NOTE — TELEPHONE ENCOUNTER
Patient calling to speak with nurse regarding his medications through [facility] needs script Tamsulosin for 2 months, not just 30 days. Please call at [phone],thanks.

## 2024-08-30 NOTE — TELEPHONE ENCOUNTER
Called pt to discuss below, no answer, left a detailed message.   Resent Tamsulosin script to [facility] for 90 days.

## 2024-09-11 NOTE — PROGRESS NOTES
HPI:     Amilcar Dejesus is a 79 year old healthy male with a PMH of HTN, HL, hypothyroid, GERD, CAD.  Following for:  1. BPH/LUTS   - flomax since 2014, now BID flomax in 2021, proscar 2019  - s/p TURP ~ 2009  2. ED  - 100 mg viagra prn ([place])  3. OAB/UI  - Kegels reviewed and rec he decrease coffee  4. Gross hematuria  - 6/5/24    PCP - Sharri  Prior Urologist - Vic (2021)  LOV: 8/12/24 ([place]), 6/13/23    Presents for check-up. Had  gross hematuria in June 2024 with no other episodes.  He is taking BID flomax, proscar.   More bothered by nocturia x 2-3 and decent amounts.  Tries to limit fluids before bedtime.  Does not snore, takes occasional ibuprofen.    AUA SS is 11/35 with 2 n, w, I, f, ADALGISA; 1 s. Mixed feelings towards LUTS.  Incontinence: UI/dribbles with a full bladder a couple times per week and not bothersome.   AMIRAH LOV: > 50 g prostate, no nodules or tenderness    UA is negative and PVR was 26, 2, 2 mL    Reported ~ 60 oz water, 40 oz coffee, rare juice with light to medium urine.     0% potency without meds and 100% potency with 100 mg viagra prn ([place]) but hasn't been using much recently.    PSA 1.30 6/1/23. We discussed the risks and benefits to PSA screening and he would prefer to check.    CTU 8/27/24: ~ 141 g prostate, no other abnormalities    He declines OAB meds for now. Would like to try Kegels for a bit for OAB/UI and if insufficient is interested in PFT.     Cysto today: severe BPH with some papillary projections noted at the bladder neck.    We discussed the papillary projections may just be inflammation. He is concerned about hematuria recurring.   Discussed that options would include observation with f/u in 6 mo for cystoscopy to see if the inflammatory area at the trigone improves. Alternatively could proceed with biopsy, though this may increase risk for hematuria which he hopes to avoid.  I would probably advise against TURP given the very large prostate size  but we discussed HoLep may be an option and discussed the pros and cons to all of these options.    After discussing options he requests referral to discuss [facility].    He will continue to drink plenty of fluids and start exercising again to help with nocturia. Continue BID flomax, proscar. He will try Kegels for a bit and if insufficient will try PFT. Continue 100 mg viagra prn ([place]). Referral placed for HoLep.    I spent over 40 minutes in consultation and coordination of this patient's care today including review of pertinent labs, imaging, records with > 50% of time spent counseling - in addition to time spent performing cystoscopy.      PROCEDURE NOTE    PROCEDURE PERFORMED: Flexible Cystoscopy    After informed consent and urinalysis was obtained, he was placed in the supine position and prepped and draped in the usual sterile fashion using Betadine. Local anesthesia was induced by the introduction of 2% Lidocaine jelly per urethra.  A 16 Sami flexible scope was passed through the anterior urethra, the posterior urethra and prostate were negotiated and the bladder was entered. The entirety of the bladder was examined and the scope was retroflexed to examine the bladder neck.     Findings: as noted above    The patient tolerated the procedure well, suffered no complications, was able to void spontaneously after completion of the procedure in the office, and left the office in good condition.    Destiny-procedural antibiotics were given.  _________________________________      Prior note:  Reports > 5-10 y h/o LUTS which is stable  Prior BPH/OAB meds: flomax and proscar as noted above which partially help.  Has urgency and needs to void every 2 h during the day to prevent leakage.    Gross hematuria: none  Tobacco hx: 15 pack years, quit ~ 2000  Kidney stone hx: none  Fam h/o  malignancy: none    CTU 5/27/16: no stones, left renal and parapelvic cysts, significant BPH    We discussed Kegel exercises for  incontinence. I provided and reviewed educational materials for this. Reviewed PFT as option for later.    HISTORY:  Past Medical History:    Actinic keratoses    [facility]    Allergic rhinitis    Anxiety    Arthritis    left thumb    Atherosclerosis of coronary artery    triple-bypass    Depression    Heart attack (HCC)    Heart attack (HCC)    Hyperthyroidism    Hypothyroidism    Lentigines    [facility]    Lower urinary tract symptoms (LUTS)    Multiple benign nevi    [facility]    Other follicular cysts of the skin and subcutaneous tissue    [facility] - Cyst - will monitor lesion on trunk.    Thrombocytopenia (HCC)    Thyroid disease    Visual impairment    glasses      Past Surgical History:   Procedure Laterality Date    Angioplasty (coronary)  2015    stents x 2    Cabg  2005    Colonoscopy  2019    Other  2009    prostate surgey    Other  2016    left rotator cuff    Other surgical history  2017    torn rotator cuff    Skin surgery      Tonsillectomy      Vasectomy        Family History   Problem Relation Age of Onset    Heart Attack Other 92      Social History:   Social History     Socioeconomic History    Marital status:    Tobacco Use    Smoking status: Former     Current packs/day: 0.00     Average packs/day: 0.5 packs/day for 20.0 years (10.0 ttl pk-yrs)     Types: Cigarettes     Start date: 3/18/1985     Quit date: 3/18/2005     Years since quittin.5     Passive exposure: Past    Smokeless tobacco: Never   Vaping Use    Vaping status: Never Used   Substance and Sexual Activity    Alcohol use: Yes     Alcohol/week: 1.0 standard drink of alcohol     Types: 1 Glasses of wine per week     Comment: Wine daily 1oz    Drug use: No   Other Topics Concern    Caffeine Concern Yes     Comment: 3 cups a day    Exercise Yes     Comment: 3 days a week- cardio and weights    Seat Belt Yes    Special Diet No    Stress Concern Yes     Comment: personal     Weight Concern No     Social Determinants of Health      [facility], [facility]    Social Connections    [facility] from [facility], [facility]    Housing Stability        Medications (Active prior to today's visit):  Current Outpatient Medications   Medication Sig Dispense Refill    tamsulosin 0.4 MG Oral Cap Take 1 capsule (0.4 mg total) by mouth in the morning and 1 capsule (0.4 mg total) before bedtime. 180 capsule 1    LEVOTHYROXINE 112 MCG Oral Tab TAKE 1 TABLET BY MOUTH BEFORE BREAKFAST. 90 tablet 0    SILDENAFIL CITRATE 100 MG Oral Tab TAKE 1 TABLET BY MOUTH DAILY AS  NEEDED FOR ERECTILE DYSFUNCTION 12 tablet 0    ketoconazole 2 % External Shampoo Apply 1 Application topically as needed for Itching.      finasteride 5 MG Oral Tab Take 1 tablet (5 mg total) by mouth daily. 30 tablet 0    Simethicone (GAS-X OR) daily as needed.      atorvastatin 80 MG Oral Tab Take 1 tablet (80 mg total) by mouth daily. 90 tablet 3    Losartan Potassium 25 MG Oral Tab Take 1 tablet (25 mg total) by mouth nightly. 90 tablet 1    ezetimibe 10 MG Oral Tab Take 1 tablet (10 mg total) by mouth daily. 90 tablet 1    POTASSIUM OR Take by mouth as needed. Potassium Over The Counter daily      aspirin 81 MG Oral Tab Take 1 tablet (81 mg total) by mouth nightly.      Multiple Vitamins-Minerals (CENTRUM CARDIO OR) Take by mouth daily.         Allergies:  Allergies   Allergen Reactions    Penicillins RASH         ROS:     A comprehensive 10 point review of systems was completed.  Pertinent positives and negatives noted in the the HPI.    PHYSICAL EXAM:     GENERAL APPEARANCE: well, developed, well nourished, in no acute distress  NEUROLOGIC: nonfocal, alert and oriented  HEAD: normocephalic, atraumatic  EYES: sclera non-icteric  EARS: hearing intact  ORAL CAVITY: mucosa moist  NECK/THYROID: no obvious goiter or masses  LUNGS: nonlabored  breathing  ABDOMEN: soft, no obvious masses or tenderness  SKIN: no obvious rashes    : as noted above    ASSESSMENT/PLAN:   Diagnoses and all orders for this visit:    Urine finding  -     sulfamethoxazole-trimethoprim DS (Bactrim DS) 800-160 MG per tab 1 tablet    BPH with obstruction/lower urinary tract symptoms    Gross hematuria  -     CYSTOURETHROSCOPY    Urge incontinence    Erectile dysfunction, unspecified erectile dysfunction type    Elevated PSA  -     PSA Total, Diagnostic; Future    - as noted above.    Thanks again for this consult.    Wili Zavaleta MD, FACS  Urologist  [facility]  Office: [phone]

## 2024-09-23 ENCOUNTER — LAB ENCOUNTER (OUTPATIENT)
Dept: LAB | Facility: HOSPITAL | Age: 79
End: 2024-09-23
Attending: UROLOGY
Payer: MEDICARE

## 2024-09-23 ENCOUNTER — PROCEDURE (OUTPATIENT)
Dept: SURGERY | Facility: CLINIC | Age: 79
End: 2024-09-23
Payer: COMMERCIAL

## 2024-09-23 VITALS — SYSTOLIC BLOOD PRESSURE: 168 MMHG | DIASTOLIC BLOOD PRESSURE: 71 MMHG

## 2024-09-23 DIAGNOSIS — R31.0 GROSS HEMATURIA: ICD-10-CM

## 2024-09-23 DIAGNOSIS — R97.20 ELEVATED PSA: ICD-10-CM

## 2024-09-23 DIAGNOSIS — N52.9 ERECTILE DYSFUNCTION, UNSPECIFIED ERECTILE DYSFUNCTION TYPE: ICD-10-CM

## 2024-09-23 DIAGNOSIS — N39.41 URGE INCONTINENCE: ICD-10-CM

## 2024-09-23 DIAGNOSIS — N13.8 BPH WITH OBSTRUCTION/LOWER URINARY TRACT SYMPTOMS: ICD-10-CM

## 2024-09-23 DIAGNOSIS — N40.1 BPH WITH OBSTRUCTION/LOWER URINARY TRACT SYMPTOMS: ICD-10-CM

## 2024-09-23 DIAGNOSIS — R82.90 URINE FINDING: Primary | ICD-10-CM

## 2024-09-23 LAB — PSA SERPL-MCNC: 0.91 NG/ML (ref ?–4)

## 2024-09-23 PROCEDURE — 36415 COLL VENOUS BLD VENIPUNCTURE: CPT

## 2024-09-23 PROCEDURE — 84153 ASSAY OF PSA TOTAL: CPT

## 2024-09-23 RX ORDER — SULFAMETHOXAZOLE/TRIMETHOPRIM 800-160 MG
1 TABLET ORAL ONCE
Status: COMPLETED | OUTPATIENT
Start: 2024-09-23 | End: 2024-09-23

## 2024-09-23 RX ADMIN — SULFAMETHOXAZOLE/TRIMETHOPRIM 1 TABLET: 800-160 MG TABLET ORAL at 14:50:00

## 2024-09-23 NOTE — PATIENT INSTRUCTIONS
- Kim Solis MD  Address: [address]wn[address], [address] , [place] [address]  Phone: [phone]    To discuss [facility]

## 2024-09-29 DIAGNOSIS — K21.9 GASTROESOPHAGEAL REFLUX DISEASE, UNSPECIFIED WHETHER ESOPHAGITIS PRESENT: ICD-10-CM

## 2024-10-01 DIAGNOSIS — N40.0 BENIGN PROSTATIC HYPERPLASIA, UNSPECIFIED WHETHER LOWER URINARY TRACT SYMPTOMS PRESENT: ICD-10-CM

## 2024-10-01 RX ORDER — FINASTERIDE 5 MG/1
5 TABLET, FILM COATED ORAL DAILY
Qty: 100 TABLET | Refills: 2 | Status: SHIPPED | OUTPATIENT
Start: 2024-10-01

## 2024-10-01 NOTE — TELEPHONE ENCOUNTER
Mcm sent to clarify as was discontinued in patient chart.    Last OV relevant to medication: 5/14   Last refill date: 12/12/23     #/refills: 90/1   When pt was asked to return for OV: Return in about 6 months (around 11/14/2024) for med check.    Upcoming appt/reason:   Future Appointments   Date Time Provider Department Center   10/23/2024  2:45 PM [facility] AUDIO [facility] EMGAUDIONAPE [id number]   10/23/2024  3:15 PM Olman Yusuf MD EMGOTONAPER [id number]   10/28/2024  8:45 AM BK MR RM1 (1.5T) [facility]   3/25/2025 10:00 AM Evelio Messer MD ENIWARREN [facility]       Was pt informed of any over due labs: skye; yariel sent

## 2024-10-03 DIAGNOSIS — E03.9 ACQUIRED HYPOTHYROIDISM: ICD-10-CM

## 2024-10-04 RX ORDER — LEVOTHYROXINE SODIUM 112 UG/1
112 TABLET ORAL
Qty: 90 TABLET | Refills: 0 | Status: SHIPPED | OUTPATIENT
Start: 2024-10-04

## 2024-10-04 NOTE — TELEPHONE ENCOUNTER
Thyroid Medication Protocol Ebzead76/03/2024 03:38 AM   Protocol Details TSH in past 12 months    Last TSH value is normal    In person appointment or virtual visit in the past 12 mos or appointment in next 3 mos   8. Acquired hypothyroidism  Continue levothyroxine  Future Appointments   Date Time Provider Department Center   10/23/2024  2:45 PM [facility]UDIO [facility] EMGAUDIONAPE [id number]   10/23/2024  3:15 PM Olman Yusuf MD EMGOTONAPER [id number]   10/28/2024  8:45 AM LUL MR RM1 (1.5T) [facility]   3/25/2025 10:00 AM Evelio Messer MD ENVLADIMIR [facility]

## 2024-10-08 ENCOUNTER — TELEPHONE (OUTPATIENT)
Dept: NEUROLOGY | Facility: CLINIC | Age: 79
End: 2024-10-08

## 2024-10-08 NOTE — TELEPHONE ENCOUNTER
Patients spouse requested neuropsychological evaluation faxed to Dr. Raheem Resendez    Fax:  [phone]  Fax confirmed

## 2024-10-09 ENCOUNTER — TELEPHONE (OUTPATIENT)
Dept: SURGERY | Facility: CLINIC | Age: 79
End: 2024-10-09

## 2024-10-09 DIAGNOSIS — N40.0 BENIGN PROSTATIC HYPERPLASIA, UNSPECIFIED WHETHER LOWER URINARY TRACT SYMPTOMS PRESENT: Primary | ICD-10-CM

## 2024-10-09 NOTE — TELEPHONE ENCOUNTER
Patient's spouse states Dr. Kim Solis requries a referral for patient to see them. Patient is currently scheduled with their PA Idania Jeronimo. Patients spouse also states they are requesting all images and visit notes faxed to [phone] with attention to Idania Jeronimo. Please advise.

## 2024-10-23 ENCOUNTER — OFFICE VISIT (OUTPATIENT)
Facility: LOCATION | Age: 79
End: 2024-10-23
Payer: COMMERCIAL

## 2024-10-23 DIAGNOSIS — K21.9 GASTROESOPHAGEAL REFLUX DISEASE WITHOUT ESOPHAGITIS: ICD-10-CM

## 2024-10-23 DIAGNOSIS — H90.3 SENSORINEURAL HEARING LOSS (SNHL) OF BOTH EARS: Primary | ICD-10-CM

## 2024-10-23 DIAGNOSIS — H93.293 ABNORMAL AUDITORY PERCEPTION OF BOTH EARS: Primary | ICD-10-CM

## 2024-10-23 PROCEDURE — 1159F MED LIST DOCD IN RCRD: CPT | Performed by: OTOLARYNGOLOGY

## 2024-10-23 PROCEDURE — 1160F RVW MEDS BY RX/DR IN RCRD: CPT | Performed by: OTOLARYNGOLOGY

## 2024-10-23 PROCEDURE — 92557 COMPREHENSIVE HEARING TEST: CPT | Performed by: AUDIOLOGIST

## 2024-10-23 PROCEDURE — 92567 TYMPANOMETRY: CPT | Performed by: AUDIOLOGIST

## 2024-10-23 PROCEDURE — 31575 DIAGNOSTIC LARYNGOSCOPY: CPT | Performed by: OTOLARYNGOLOGY

## 2024-10-23 PROCEDURE — 99204 OFFICE O/P NEW MOD 45 MIN: CPT | Performed by: OTOLARYNGOLOGY

## 2024-10-23 RX ORDER — OMEPRAZOLE 40 MG/1
40 CAPSULE, DELAYED RELEASE ORAL DAILY
Qty: 30 CAPSULE | Refills: 2 | Status: SHIPPED | OUTPATIENT
Start: 2024-10-23

## 2024-10-23 NOTE — PROGRESS NOTES
Amilcar Dejesus was seen for audiometric evaluation today.  Referred back to physician.     David Lr

## 2024-10-23 NOTE — PROGRESS NOTES
Amilcar Dejesus is a 79 year old male.   Chief Complaint   Patient presents with    Hearing Check     HPI:   79-year-old white male came in for the following problems.    #1.  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    #2.  For some time now he has been having difficulty with swallowing food getting caught denies heartburn non-smoker.  Current Outpatient Medications   Medication Sig Dispense Refill    levothyroxine 112 MCG Oral Tab TAKE 1 TABLET BY MOUTH BEFORE  BREAKFAST 90 tablet 0    FINASTERIDE 5 MG Oral Tab TAKE 1 TABLET BY MOUTH DAILY 100 tablet 2    tamsulosin 0.4 MG Oral Cap Take 1 capsule (0.4 mg total) by mouth in the morning and 1 capsule (0.4 mg total) before bedtime. 180 capsule 1    SILDENAFIL CITRATE 100 MG Oral Tab TAKE 1 TABLET BY MOUTH DAILY AS  NEEDED FOR ERECTILE DYSFUNCTION 12 tablet 0    ketoconazole 2 % External Shampoo Apply 1 Application topically as needed for Itching.      Simethicone (GAS-X OR) daily as needed.      atorvastatin 80 MG Oral Tab Take 1 tablet (80 mg total) by mouth daily. 90 tablet 3    Losartan Potassium 25 MG Oral Tab Take 1 tablet (25 mg total) by mouth nightly. 90 tablet 1    ezetimibe 10 MG Oral Tab Take 1 tablet (10 mg total) by mouth daily. 90 tablet 1    POTASSIUM OR Take by mouth as needed. Potassium Over The Counter daily      aspirin 81 MG Oral Tab Take 1 tablet (81 mg total) by mouth nightly.      Multiple Vitamins-Minerals (CENTRUM CARDIO OR) Take by mouth daily.        Past Medical History:    Actinic keratoses    [facility]    Allergic rhinitis    Anxiety    Arthritis    left thumb    Atherosclerosis of coronary artery    triple-bypass    Depression    Heart attack (HCC)    Heart attack (HCC)    Hyperthyroidism    Hypothyroidism    Lentigines    [facility]    Lower urinary tract  symptoms (LUTS)    Multiple benign nevi    [facility]    Other follicular cysts of the skin and subcutaneous tissue    [facility] - Cyst - will monitor lesion on trunk.    Thrombocytopenia (HCC)    Thyroid disease    Visual impairment    glasses      Social History:  Social History     Socioeconomic History    Marital status:    Tobacco Use    Smoking status: Former     Current packs/day: 0.00     Average packs/day: 0.5 packs/day for 20.0 years (10.0 ttl pk-yrs)     Types: Cigarettes     Start date: 3/18/1985     Quit date: 3/18/2005     Years since quittin.6     Passive exposure: Past    Smokeless tobacco: Never   Vaping Use    Vaping status: Never Used   Substance and Sexual Activity    Alcohol use: Yes     Alcohol/week: 1.0 standard drink of alcohol     Types: 1 Glasses of wine per week     Comment: Wine daily 1oz    Drug use: No   Other Topics Concern    Caffeine Concern Yes     Comment: 3 cups a day    Exercise Yes     Comment: 3 days a week- cardio and weights    Seat Belt Yes    Special Diet No    Stress Concern Yes     Comment: personal    Weight Concern No     Social Drivers of Health      [facility]    Social Connections    Received from [facility], [facility]    Housing Stability      Past Surgical History:   Procedure Laterality Date    Angioplasty (coronary)  2015    stents x 2    Cabg  2005    Colonoscopy  2019    Other  2009    prostate surgey    Other  2016    left rotator cuff    Other surgical history  2017    torn rotator cuff    Skin surgery      Tonsillectomy      Vasectomy           REVIEW OF SYSTEMS:   GENERAL HEALTH: feels well otherwise  GENERAL : denies fever, chills, sweats, weight loss, weight gain  SKIN: denies any unusual skin lesions or rashes  RESPIRATORY: denies shortness of breath with exertion  NEURO: denies headaches    EXAM:   There were  no vitals taken for this visit.    System Pertinent findings Details   Constitutional  Overall appearance - Normal.   Psychiatric  Orientation - Oriented to time, place, person & situation. Appropriate mood and affect.   Head/Face  Facial features -- Normal. Skull - Normal.   Eyes  Pupils equal ,round ,react to light and accomidate   Ears  External Ear Right: Normal, Left: Normal. Canal - Right: Normal, Left: Normal. TM - Right: Normal left: Normal   Nose  External Nose, Normal, Septum -midline nasal Vault, clear,Turbinates - Right: Normal left: Normal   Mouth/Throat  Lips/teeth/gums - Normal. Tonsils -0+. Oropharynx - Normal.   Neck Exam  Inspection - Normal. Palpation - Normal. Parotid gland - Normal. Thyroid gland -normal   Neurological  Cranial nerves - Cranial nerves II through XII grossly intact.   Nasopharynx   Normal        Lymph Detail  Submental. Submandibular. Anterior cervical. Posterior cervical. Supraclavicular.   Audiogram shows high-frequency sensorineural hearing loss good to scram      Flexible Laryngoscopy Procedure Note (74224)    Due to inability for adequate examination of the larynx and need for magnification to perform the examination, endoscopy was performed.  Risks and benefits were discussed with patient/family and they have given verbal consent to proceed.    Pre-operative Diagnosis:   1. Sensorineural hearing loss (SNHL) of both ears    2. Gastroesophageal reflux disease without esophagitis        Post-operative Diagnosis: Same    Procedure: Diagnostic flexible fiberoptic laryngoscopy    Anesthesia: Topical anesthetic Stanton     Surgeon Olman Yusuf MD    EBL: 0cc    Procedure Detail & Findings: The patient was topically anesthetized within the nose, bilaterally.  The flexible laryngoscope was placed through the nostrils bilaterally.  The findings within the nose midline.  The findings within the nasopharynx clear.  The findings within the hypopharynx normal.  The findings within the  larynx normal      .    Condition: Stable    Complications: Patient tolerated the procedure well with no immediate complication.    Olman Hanna MD      ASSESSMENT AND PLAN:   1. Sensorineural hearing loss (SNHL) of both ears  Patient is medically cleared for hearing aids bilaterally given the name of audiology would like to get a 1 year follow-up audiogram as well      2. Gastroesophageal reflux disease without esophagitis  Omeprazole 40 mg 1 daily esophagram with results over the phone following up to see how he does in 6 to 8 weeks regarding the omeprazole and swallowing  The patient indicates understanding of these issues and agrees to the plan.      Olman Yusuf MD  10/23/2024  5:01 PM

## 2024-10-28 ENCOUNTER — HOSPITAL ENCOUNTER (OUTPATIENT)
Dept: MRI IMAGING | Age: 79
Discharge: HOME | End: 2024-10-28
Attending: Other
Payer: MEDICARE

## 2024-10-28 DIAGNOSIS — G31.84 MILD COGNITIVE IMPAIRMENT WITH MEMORY LOSS: ICD-10-CM

## 2024-10-28 PROCEDURE — 70553 MRI BRAIN STEM W/O & W/DYE: CPT | Performed by: OTHER

## 2024-10-28 PROCEDURE — A9575 INJ GADOTERATE MEGLUMI 0.1ML: HCPCS

## 2024-10-28 RX ORDER — GADOTERATE MEGLUMINE 376.9 MG/ML
20 INJECTION INTRAVENOUS
Status: COMPLETED | OUTPATIENT
Start: 2024-10-28 | End: 2024-10-28

## 2024-10-28 RX ADMIN — GADOTERATE MEGLUMINE 17 ML: 376.9 INJECTION INTRAVENOUS at 09:44:00

## 2024-11-07 RX ORDER — TAMSULOSIN HYDROCHLORIDE 0.4 MG/1
0.4 CAPSULE ORAL 2 TIMES DAILY
Qty: 200 CAPSULE | Refills: 2 | Status: SHIPPED | OUTPATIENT
Start: 2024-11-07

## 2024-11-29 ENCOUNTER — TELEPHONE (OUTPATIENT)
Dept: NEUROLOGY | Facility: CLINIC | Age: 79
End: 2024-11-29

## 2024-11-29 NOTE — TELEPHONE ENCOUNTER
Had received Neuropsychological Evaluation Evaluation, report placed at the nurses [facility] Patient has an appointment 12/09 to go over the results.

## 2024-11-29 NOTE — TELEPHONE ENCOUNTER
Abe lilly received. Report visible in care everywhere under [facility] tab. No need to send copy to scan. Report placed on provider's desk for review.

## 2025-01-17 DIAGNOSIS — E03.9 ACQUIRED HYPOTHYROIDISM: ICD-10-CM

## 2025-01-17 RX ORDER — LEVOTHYROXINE SODIUM 112 UG/1
112 TABLET ORAL
Qty: 90 TABLET | Refills: 0 | Status: SHIPPED | OUTPATIENT
Start: 2025-01-17

## 2025-01-17 NOTE — TELEPHONE ENCOUNTER
Thyroid Medication Protocol Egkosc1201/17/2025 02:35 PM   Protocol Details TSH in past 12 months    Last TSH value is normal    In person appointment or virtual visit in the past 12 mos or appointment in next 3 mos    Medication is active on med list         8. Acquired hypothyroidism  Continue levothyroxine.  Future Appointments   Date Time Provider Department Center   1/27/2025 10:00 AM Evelio Messer MD [facility] [place]   3/25/2025 10:00 AM Evelio Messer MD [facility] [place]   6/9/2025  1:00 PM Evelio Messer MD [facility] [place]

## 2025-01-20 ENCOUNTER — TELEPHONE (OUTPATIENT)
Facility: LOCATION | Age: 80
End: 2025-01-20

## 2025-01-20 RX ORDER — OMEPRAZOLE 40 MG/1
40 CAPSULE, DELAYED RELEASE ORAL DAILY
Qty: 30 CAPSULE | Refills: 2 | Status: SHIPPED | OUTPATIENT
Start: 2025-01-20

## 2025-01-20 RX ORDER — OMEPRAZOLE 40 MG/1
40 CAPSULE, DELAYED RELEASE ORAL DAILY
Qty: 90 CAPSULE | Refills: 0 | OUTPATIENT
Start: 2025-01-20

## 2025-01-20 NOTE — TELEPHONE ENCOUNTER
[facility] contacting to refill a prescription of Omeprazole. Per last office visit note on 10/23/24. Pt was suppose to follow up in 6-8 weeks. No future appointment has been made at this time. Please advise.

## 2025-01-20 NOTE — TELEPHONE ENCOUNTER
Requested Prescriptions     Pending Prescriptions Disp Refills    OMEPRAZOLE 40 MG Oral Capsule Delayed Release [Pharmacy Med Name: OMEPRAZOLE DR 40 MG CAPSULE] 90 capsule 0     Sig: TAKE 1 CAPSULE (40 MG TOTAL) BY MOUTH DAILY BEFORE A MEAL       FILLED- 10/23/24  LOV- 10/23/24    F/u required

## 2025-01-20 NOTE — TELEPHONE ENCOUNTER
Renewed his prescription for omeprazole sometime in the next 90 days if he would come in for recheck.

## 2025-02-11 ENCOUNTER — HOSPITAL ENCOUNTER (OUTPATIENT)
Dept: GENERAL RADIOLOGY | Age: 80
Discharge: HOME | End: 2025-02-11
Attending: NURSE PRACTITIONER
Payer: MEDICARE

## 2025-02-11 ENCOUNTER — OFFICE VISIT (OUTPATIENT)
Dept: INTERNAL MEDICINE CLINIC | Facility: CLINIC | Age: 80
End: 2025-02-11
Payer: COMMERCIAL

## 2025-02-11 VITALS
TEMPERATURE: 100 F | WEIGHT: 181.63 LBS | SYSTOLIC BLOOD PRESSURE: 136 MMHG | OXYGEN SATURATION: 95 % | HEIGHT: 69 IN | DIASTOLIC BLOOD PRESSURE: 80 MMHG | RESPIRATION RATE: 16 BRPM | BODY MASS INDEX: 26.9 KG/M2 | HEART RATE: 90 BPM

## 2025-02-11 DIAGNOSIS — F09 MILD COGNITIVE DISORDER: ICD-10-CM

## 2025-02-11 DIAGNOSIS — R09.89 LUNG CRACKLES: ICD-10-CM

## 2025-02-11 DIAGNOSIS — R26.89 BALANCE PROBLEM: ICD-10-CM

## 2025-02-11 DIAGNOSIS — B34.9 VIRAL SYNDROME: Primary | ICD-10-CM

## 2025-02-11 LAB
BILIRUB UR QL STRIP.AUTO: NEGATIVE
CLARITY UR REFRACT.AUTO: CLEAR
COLOR UR AUTO: YELLOW
COVID19 BINAX NOW ANTIGEN: NOT DETECTED
GLUCOSE UR STRIP.AUTO-MCNC: NORMAL MG/DL
KETONES UR STRIP.AUTO-MCNC: NEGATIVE MG/DL
LEUKOCYTE ESTERASE UR QL STRIP.AUTO: NEGATIVE
NITRITE UR QL STRIP.AUTO: NEGATIVE
OPERATOR ID: NORMAL
PH UR STRIP.AUTO: 6 [PH] (ref 5–8)
PROT UR STRIP.AUTO-MCNC: 50 MG/DL
RBC UR QL AUTO: NEGATIVE
SP GR UR STRIP.AUTO: >1.03 (ref 1–1.03)
UROBILINOGEN UR STRIP.AUTO-MCNC: NORMAL MG/DL

## 2025-02-11 PROCEDURE — 1160F RVW MEDS BY RX/DR IN RCRD: CPT | Performed by: NURSE PRACTITIONER

## 2025-02-11 PROCEDURE — 87637 SARSCOV2&INF A&B&RSV AMP PRB: CPT | Performed by: NURSE PRACTITIONER

## 2025-02-11 PROCEDURE — 99214 OFFICE O/P EST MOD 30 MIN: CPT | Performed by: NURSE PRACTITIONER

## 2025-02-11 PROCEDURE — 87086 URINE CULTURE/COLONY COUNT: CPT | Performed by: NURSE PRACTITIONER

## 2025-02-11 PROCEDURE — 1159F MED LIST DOCD IN RCRD: CPT | Performed by: NURSE PRACTITIONER

## 2025-02-11 PROCEDURE — 3008F BODY MASS INDEX DOCD: CPT | Performed by: NURSE PRACTITIONER

## 2025-02-11 PROCEDURE — 1170F FXNL STATUS ASSESSED: CPT | Performed by: NURSE PRACTITIONER

## 2025-02-11 PROCEDURE — G2211 COMPLEX E/M VISIT ADD ON: HCPCS | Performed by: NURSE PRACTITIONER

## 2025-02-11 PROCEDURE — 3075F SYST BP GE 130 - 139MM HG: CPT | Performed by: NURSE PRACTITIONER

## 2025-02-11 PROCEDURE — 3079F DIAST BP 80-89 MM HG: CPT | Performed by: NURSE PRACTITIONER

## 2025-02-11 PROCEDURE — 81001 URINALYSIS AUTO W/SCOPE: CPT | Performed by: NURSE PRACTITIONER

## 2025-02-11 PROCEDURE — 71046 X-RAY EXAM CHEST 2 VIEWS: CPT | Performed by: NURSE PRACTITIONER

## 2025-02-11 RX ORDER — OSELTAMIVIR PHOSPHATE 75 MG/1
75 CAPSULE ORAL 2 TIMES DAILY
Qty: 10 CAPSULE | Refills: 0 | Status: SHIPPED | OUTPATIENT
Start: 2025-02-11

## 2025-02-11 NOTE — PROGRESS NOTES
Amilcar Dejesus is a 79 year old male.  CHIEF COMPLAINT   Multiple symptoms    HPI:   Symptoms started Tuesdays ago with aches, fever, chills, dry cough causing muscle pain to the chest, headaches, fatigue, poor appetite, diarrhea.    No rashes, loss of smell or taste, shortness of breath, sore throat.    Has not had a COVID test yet    Current Outpatient Medications   Medication Sig Dispense Refill    levothyroxine 112 MCG Oral Tab TAKE 1 TABLET BY MOUTH DAILY  BEFORE BREAKFAST 90 tablet 0    Omeprazole 40 MG Oral Capsule Delayed Release Take 1 capsule (40 mg total) by mouth daily. Before a meal 30 capsule 2    ketoconazole 2 % External Shampoo Apply 1 Application topically as needed for Itching.      atorvastatin 80 MG Oral Tab Take 1 tablet (80 mg total) by mouth daily. 90 tablet 3    Losartan Potassium 25 MG Oral Tab Take 1 tablet (25 mg total) by mouth nightly. 90 tablet 1    aspirin 81 MG Oral Tab Take 1 tablet (81 mg total) by mouth nightly.      Multiple Vitamins-Minerals (CENTRUM CARDIO OR) Take by mouth daily.      Omeprazole 40 MG Oral Capsule Delayed Release Take 1 capsule (40 mg total) by mouth daily. Before a meal 30 capsule 2    TAMSULOSIN 0.4 MG Oral Cap TAKE 1 CAPSULE BY MOUTH IN THE  MORNING AND 1 CAPSULE BY MOUTH  BEFORE BEDTIME 200 capsule 2    FINASTERIDE 5 MG Oral Tab TAKE 1 TABLET BY MOUTH DAILY 100 tablet 2    SILDENAFIL CITRATE 100 MG Oral Tab TAKE 1 TABLET BY MOUTH DAILY AS  NEEDED FOR ERECTILE DYSFUNCTION 12 tablet 0    Simethicone (GAS-X OR) daily as needed.      ezetimibe 10 MG Oral Tab Take 1 tablet (10 mg total) by mouth daily. 90 tablet 1    POTASSIUM OR Take by mouth as needed. Potassium Over The Counter daily        Past Medical History:    Actinic keratoses    [facility]    Allergic rhinitis    Anxiety    Arthritis    left thumb    Atherosclerosis of coronary artery    triple-bypass    Depression    Heart attack (HCC)    Heart attack (HCC)    Hyperthyroidism     Hypothyroidism    Lentigines    [facility]    Lower urinary tract symptoms (LUTS)    Multiple benign nevi    [facility]    Other follicular cysts of the skin and subcutaneous tissue    [facility] - Cyst - will monitor lesion on trunk.    Thrombocytopenia    Thyroid disease    Visual impairment    glasses      Social History:  Social History     Socioeconomic History    Marital status:    Tobacco Use    Smoking status: Former     Current packs/day: 0.00     Average packs/day: 0.5 packs/day for 20.0 years (10.0 ttl pk-yrs)     Types: Cigarettes     Start date: 3/18/1985     Quit date: 3/18/2005     Years since quittin.9     Passive exposure: Past    Smokeless tobacco: Never   Vaping Use    Vaping status: Never Used   Substance and Sexual Activity    Alcohol use: Yes     Alcohol/week: 1.0 standard drink of alcohol     Types: 1 Glasses of wine per week     Comment: Wine daily 1oz    Drug use: No   Other Topics Concern    Caffeine Concern Yes     Comment: 3 cups a day    Exercise Yes     Comment: 3 days a week- cardio and weights    Seat Belt Yes    Special Diet No    Stress Concern Yes     Comment: personal    Weight Concern No     Social Drivers of Health      [facility]    Housing Stability        REVIEW OF SYSTEMS:   See HPI     EXAM:     /80 (BP Location: Left arm, Patient Position: Sitting, Cuff Size: adult)   Pulse 90   Temp 100 °F (37.8 °C) (Temporal)   Resp 16   Ht 5' 9\" (1.753 m)   Wt 181 lb 9.6 oz (82.4 kg)   SpO2 95%   BMI 26.82 kg/m²   Body mass index is 26.82 kg/m².   GENERAL: well developed, well nourished,in no apparent distress  HEENT: atraumatic, normocephalic, ears are clear, no sinus pain  EYES:  conjunctiva are clear  NECK: supple,no adenopathy    LUNGS: clear to auscultation to right lung. Left lung with crackles to base. Coughing frequently.   CARDIO: RRR without murmur  GI:  no  masses, organomegaly or tenderness  MUSCULOSKELETAL:  abnormal gait.  EXTREMITIES: no edema or calve tenderness   NEURO: Oriented times three       LABS:      Lab Results   Component Value Date    WBC 3.9 (L) 07/29/2024    RBC 4.54 07/29/2024    HGB 15.5 07/29/2024    HCT 43.3 07/29/2024    MCV 95.4 07/29/2024    MCH 34.1 (H) 07/29/2024    MCHC 35.8 07/29/2024    RDW 12.5 07/29/2024    .0 (L) 07/29/2024      Lab Results   Component Value Date    GLU 97 07/29/2024    BUN 19 07/29/2024    BUNCREA 19.1 01/02/2021    CREATSERUM 0.89 07/29/2024    ANIONGAP 4 07/29/2024    GFR 79 05/27/2016    GFRNAA 84 01/02/2021    GFRAA 97 01/02/2021    CA 8.9 07/29/2024    OSMOCALC 296 (H) 07/29/2024    ALKPHO 50 07/29/2024    AST 21 07/29/2024    ALT 30 07/29/2024    BILT 1.5 (H) 07/29/2024    TP 6.5 08/26/2024    ALB 4.09 08/26/2024    GLOBULIN 2.3 07/29/2024     07/29/2024    K 4.2 07/29/2024     07/29/2024    CO2 29.0 07/29/2024      Lab Results   Component Value Date    CHOLEST 128 07/29/2024    TRIG 37 07/29/2024    HDL 65 (H) 07/29/2024    LDL 53 07/29/2024    VLDL 5 07/29/2024    TCHDLRATIO 1.82 12/02/2015    NONHDLC 63 07/29/2024      Lab Results   Component Value Date    T4F 1.0 11/27/2018    TSH 3.598 07/29/2024      Lab Results   Component Value Date     12/02/2015    A1C 5.3 12/02/2015        ASSESSMENT AND PLAN:   1. Viral syndrome  - has multiple symptoms of viral syndrome. Covid negative.  - quad swab sent.   - tamiflu ordered- see pt instructions  - supportive care discussed   - to ER for emergent symptoms   - SARS-CoV-2/Flu A and B/RSV by PCR ([facility]) [E]; Future  - COVID19 BinaxNOW Antigen  - SARS-CoV-2/Flu A and B/RSV by PCR ([facility]) [E]    2. Lung crackles  - get cxr as a prec   - XR CHEST PA + LAT CHEST (CPT=71046); Future    3. Mild cognitive disorder  - continue to see neurology, second opinion referral given  - urine test ordered   - Urinalysis, Routine [E]; Future  - Urine Culture,  Routine [E]; Future  - Urinalysis, Routine [E]  - Urine Culture, Routine [E]    4. Balance problem  - start PT  - fall prec   - OP REFERRAL TO [facility]      The patient indicates understanding of these issues and agrees to the plan.  The patient is asked to return as needed or when routine care is due.

## 2025-02-11 NOTE — PATIENT INSTRUCTIONS
Get your chest x-ray completed today    Start Tamiflu.  Monitor closely for side effects, effectiveness.    Do deep breathing exercises    Go to the ER for any emergent symptoms. If you cannot get there safely call 911.     Keep well hydrated, eat small frequent meals     You can use robitussin DM or mucinex DM as directed on the bottle for cough and chest congestion.      Use cepacol for sore throat and dry cough as needed.     Halls in the green bag are good     Use tylenol as needed for pain or fever-at 1000 mg twice daily as needed.     Use Flonase    Monitor closely for secondary infection symptoms of the sinuses, ears, pneumonia     Follow up as needed or when routine care is due  Viral Upper Respiratory Illness (Adult)    You have a viral upper respiratory illness (URI), which is another term for the common cold. This illness is contagious during the first few days. It is spread through the air by coughing and sneezing. It may also be spread by direct contact (touching the sick person and then touching your own eyes, nose, or mouth). Frequent handwashing will decrease risk of spread. Most viral illnesses go away within 7 to 10 days with rest and simple home remedies. Sometimes the illness may last for several weeks. Antibiotics will not kill a virus, and they are generally not prescribed for this condition.  Home care  If symptoms are severe, rest at home for the first 2 to 3 days. When you resume activity, don't let yourself get too tired.  Don't smoke. If you need help stopping, talk with your healthcare provider.  Avoid being exposed to cigarette smoke (yours or others’).  You may use acetaminophen or ibuprofen to control pain and fever, unless another medicine was prescribed. If you have chronic liver or kidney disease, have ever had a stomach ulcer or gastrointestinal bleeding, or are taking blood-thinning medicines, talk with your healthcare provider before using these medicines. Aspirin should never be  given to anyone under 18 years of age who is ill with a viral infection or fever. It may cause severe liver or brain damage.  Your appetite may be poor, so a light diet is fine. Stay well hydrated by drinking 6 to 8 glasses of fluids per day (water, soft drinks, juices, tea, or soup). Extra fluids will help loosen secretions in the nose and lungs.  Over-the-counter cold medicines will not shorten the length of time you’re sick, but they may be helpful for the following symptoms: cough, sore throat, and nasal and sinus congestion. If you take prescription medicines, ask your healthcare provider or pharmacist which over-the-counter medicines are safe to use. (Note: Don't use decongestants if you have high blood pressure.)  Follow-up care  Follow up with your healthcare provider, or as advised.  When to seek medical advice  Call your healthcare provider right away if any of these occur:  Cough with lots of colored sputum (mucus)  Severe headache; face, neck, or ear pain  Difficulty swallowing due to throat pain  Fever of 100.4°F (38°C) or higher, or as directed by your healthcare provider  Call 911  Call 911 if any of these occur:  Chest pain, shortness of breath, wheezing, or difficulty breathing  Coughing up blood  Very severe pain with swallowing, especially if it goes along with a muffled voice   Date Last Reviewed: 6/1/2018  © 6766-1438 [employer]. [address] [place] [address], [place] [address]. All rights reserved. This information is not intended as a substitute for professional medical care. Always follow your healthcare professional's instructions.

## 2025-02-12 LAB
FLUAV + FLUBV RNA SPEC NAA+PROBE: DETECTED
FLUAV + FLUBV RNA SPEC NAA+PROBE: NOT DETECTED
RSV RNA SPEC NAA+PROBE: NOT DETECTED
SARS-COV-2 RNA RESP QL NAA+PROBE: NOT DETECTED

## 2025-02-17 ENCOUNTER — LAB ENCOUNTER (OUTPATIENT)
Dept: LAB | Facility: HOSPITAL | Age: 80
End: 2025-02-17
Attending: Other
Payer: MEDICARE

## 2025-02-17 ENCOUNTER — NURSE ONLY (OUTPATIENT)
Dept: ELECTROPHYSIOLOGY | Facility: HOSPITAL | Age: 80
End: 2025-02-17
Attending: Other
Payer: MEDICARE

## 2025-02-17 DIAGNOSIS — G31.84 MILD COGNITIVE IMPAIRMENT WITH MEMORY LOSS: ICD-10-CM

## 2025-02-17 DIAGNOSIS — R20.8 DECREASED VIBRATORY SENSE: ICD-10-CM

## 2025-02-17 LAB — VIT B12 SERPL-MCNC: 692 PG/ML (ref 211–911)

## 2025-02-17 PROCEDURE — 36415 COLL VENOUS BLD VENIPUNCTURE: CPT

## 2025-02-17 PROCEDURE — 95819 EEG AWAKE AND ASLEEP: CPT

## 2025-02-17 PROCEDURE — 82607 VITAMIN B-12: CPT

## 2025-02-18 ENCOUNTER — TELEPHONE (OUTPATIENT)
Dept: PHYSICAL THERAPY | Age: 80
End: 2025-02-18

## 2025-02-20 ENCOUNTER — OFFICE VISIT (OUTPATIENT)
Dept: INTERNAL MEDICINE CLINIC | Facility: CLINIC | Age: 80
End: 2025-02-20
Payer: COMMERCIAL

## 2025-02-20 VITALS
HEART RATE: 50 BPM | HEIGHT: 69 IN | BODY MASS INDEX: 26.81 KG/M2 | OXYGEN SATURATION: 98 % | TEMPERATURE: 98 F | DIASTOLIC BLOOD PRESSURE: 76 MMHG | WEIGHT: 181 LBS | SYSTOLIC BLOOD PRESSURE: 126 MMHG

## 2025-02-20 DIAGNOSIS — B34.9 VIRAL SYNDROME: Primary | ICD-10-CM

## 2025-02-20 DIAGNOSIS — R05.2 SUBACUTE COUGH: ICD-10-CM

## 2025-02-20 PROCEDURE — 1159F MED LIST DOCD IN RCRD: CPT | Performed by: STUDENT IN AN ORGANIZED HEALTH CARE EDUCATION/TRAINING PROGRAM

## 2025-02-20 PROCEDURE — 1160F RVW MEDS BY RX/DR IN RCRD: CPT | Performed by: STUDENT IN AN ORGANIZED HEALTH CARE EDUCATION/TRAINING PROGRAM

## 2025-02-20 PROCEDURE — 99213 OFFICE O/P EST LOW 20 MIN: CPT | Performed by: STUDENT IN AN ORGANIZED HEALTH CARE EDUCATION/TRAINING PROGRAM

## 2025-02-20 PROCEDURE — 3078F DIAST BP <80 MM HG: CPT | Performed by: STUDENT IN AN ORGANIZED HEALTH CARE EDUCATION/TRAINING PROGRAM

## 2025-02-20 PROCEDURE — 3074F SYST BP LT 130 MM HG: CPT | Performed by: STUDENT IN AN ORGANIZED HEALTH CARE EDUCATION/TRAINING PROGRAM

## 2025-02-20 PROCEDURE — 1170F FXNL STATUS ASSESSED: CPT | Performed by: STUDENT IN AN ORGANIZED HEALTH CARE EDUCATION/TRAINING PROGRAM

## 2025-02-20 PROCEDURE — 3008F BODY MASS INDEX DOCD: CPT | Performed by: STUDENT IN AN ORGANIZED HEALTH CARE EDUCATION/TRAINING PROGRAM

## 2025-02-20 RX ORDER — CODEINE PHOSPHATE AND GUAIFENESIN 10; 100 MG/5ML; MG/5ML
5 SOLUTION ORAL NIGHTLY PRN
Qty: 180 ML | Refills: 0 | Status: SHIPPED | OUTPATIENT
Start: 2025-02-20

## 2025-02-20 RX ORDER — ALBUTEROL SULFATE AND BUDESONIDE 90; 80 UG/1; UG/1
90 AEROSOL, METERED RESPIRATORY (INHALATION) AS NEEDED
Qty: 90 G | Refills: 0 | COMMUNITY
Start: 2025-02-20

## 2025-02-20 RX ORDER — BENZONATATE 200 MG/1
200 CAPSULE ORAL 3 TIMES DAILY PRN
Qty: 30 CAPSULE | Refills: 0 | Status: SHIPPED | OUTPATIENT
Start: 2025-02-20

## 2025-02-20 RX ORDER — FLUTICASONE PROPIONATE 50 MCG
2 SPRAY, SUSPENSION (ML) NASAL DAILY
Qty: 16 ML | Refills: 1 | Status: SHIPPED | OUTPATIENT
Start: 2025-02-20 | End: 2026-02-15

## 2025-02-20 NOTE — PATIENT INSTRUCTIONS
Most URIs resolve within 7 to 10 days but can last up to 2 weeks.  Although symptoms may persist for more than 10 days, there is typically some improvement by day 10.      In most cases of viral URI, symptoms peak in severity between days 3 and 6, after which symptoms improve.      If fever is present, it is generally present early in the illness and disappears within the first 24 to 48 hours, with respiratory symptoms becoming more prominent after the fever has resolved.      Patients with viral infection may have purulent nasal discharge during the course of their illness; discolored, purulent nasal discharge is a sign of inflammation of the nasal and sinus mucosa. Most often, the discharge starts clear, becomes purulent, and then becomes clear again.     You may use over-the-counter medications and remedies to manage your symptoms.   Make sure it is safe for you to take the following medications before proceeding.     Pain and/or Fever  Acetaminophen (Tylenol): 500mg every 6 hours as needed for pain or fever.  Maximum 1000mg per dose, 4000mg per day.  Ibuprofen (Motrin): Avoid if you have a history of heart attack, kidney disease, or you are on a blood thinner. You can take 600 mg every 8 hours as needed for pain or fever. Make sure to take it with food.     Cough  Guaifenesin (Mucinex) for productive cough: follow box dosing as needed for cough (helps cough out sputum).  Benzonatate (tessalon perles) tree times a day as needed for cough    Sore Throat  You may use lozenges (Cepacol, Halls, Chloraseptic, Ricola every 2-3 hours as needed)   Salt water gargles: gargle throat with 1 tsp (5 g) of salt dissolved in 8 fl oz (240 mL) of warm water. This is especially helpful first thing in the morning.    You may also drink warm broth/soup, or tea with honey    Runny Nose and/or Nasal Congestion  NeilMed sinus rinses.  Use distilled water.  Saline nasal spray. Can use multiple times a day  Claritin, Xyzal, Allegra, or  Zyrtec: Once daily.  Fluticasone nasal spray (Flonase): One spray in each nostril daily (up to 2x/day). This can take a few days to take effect.     Other supportive measures include:  Drink plenty of fluids to thin out mucus.  Ensure adequate rest and nutrition.  Practice gentle nose blowing and use saline nose drops (nasal rinses such as Neti Pot) as needed for congestion.   Use cool mist humidifier at home.  Breathe in steam from a pot or bowl of warm (not too hot!) water or take a hot shower.   You also can place a warm, wet towel on your face, followed by a cool towel to help ease sinus pain and open your nasal passages.

## 2025-02-20 NOTE — PROGRESS NOTES
OFFICE NOTE       The following individual(s) verbally consented to be recorded using ambient AI listening technology and understand that they can each withdraw their consent to this listening technology at any point by asking the clinician to turn off or pause the recording:    Patient name: Amilcar Dejesus  Additional names:  Junaid Dejesusn           Patient ID: Amilcar Dejesus is a 79 year old male.  Today's Date: 02/20/25  Chief Complaint: Viral Syndrome (Cough )      History of Present Illness  De Dejesus is a 79 year old male who presents with a persistent cough following a recent flu infection.    He has a persistent, deep, and productive cough that developed after a recent flu infection. The cough produces mostly transparent phlegm, occasionally darker, and occurs throughout the day, disrupting sleep by waking him up two to three times at night. It does not worsen when lying down.    He completed a five-day course of Tamiflu, which alleviated the flu symptoms but left him with a lingering cough. A chest x-ray performed during a previous visit was normal, despite some crackles noted on the left side.    The cough is sometimes accompanied by thicker sputum, which can be light tan or yellow. No fever, chills, shortness of breath, chest tightness, wheezing, abdominal pain, nausea, vomiting, diarrhea, constipation, new muscle aches, rash, dizziness, or lightheadedness. He experiences night sweats, which have been present for over a year, and occasional chills attributed to a cool apartment. No nasal congestion, sore throat, difficulty swallowing, or changes in voice. His eyes are not itchy or red, and his vision is fine.    He has been using over-the-counter Robitussin DM, which has caused nausea and fatigue. He has Flonase available but has not been using it regularly.    He has a history of open-heart surgery.       Vitals:    02/20/25 1403   BP: 126/76   Pulse: 50   Temp: 97.8 °F  (36.6 °C)   SpO2: 98%   Weight: 181 lb (82.1 kg)   Height: 5' 9\" (1.753 m)     body mass index is 26.73 kg/m².  BP Readings from Last 3 Encounters:   02/20/25 126/76   02/11/25 136/80   01/27/25 138/55     The ASCVD Risk score (Connie GAMA, et al., 2019) failed to calculate for the following reasons:    The valid total cholesterol range is 130 to 320 mg/dL  Results  LABS  Flu: positive    RADIOLOGY  Chest x-ray: normal (02/11/2025)       Medications reviewed:  Current Outpatient Medications   Medication Sig Dispense Refill    oseltamivir 75 MG Oral Cap Take 1 capsule (75 mg total) by mouth 2 (two) times daily. 10 capsule 0    Omeprazole 40 MG Oral Capsule Delayed Release Take 1 capsule (40 mg total) by mouth daily. Before a meal 30 capsule 2    levothyroxine 112 MCG Oral Tab TAKE 1 TABLET BY MOUTH DAILY  BEFORE BREAKFAST 90 tablet 0    TAMSULOSIN 0.4 MG Oral Cap TAKE 1 CAPSULE BY MOUTH IN THE  MORNING AND 1 CAPSULE BY MOUTH  BEFORE BEDTIME 200 capsule 2    Omeprazole 40 MG Oral Capsule Delayed Release Take 1 capsule (40 mg total) by mouth daily. Before a meal 30 capsule 2    FINASTERIDE 5 MG Oral Tab TAKE 1 TABLET BY MOUTH DAILY 100 tablet 2    SILDENAFIL CITRATE 100 MG Oral Tab TAKE 1 TABLET BY MOUTH DAILY AS  NEEDED FOR ERECTILE DYSFUNCTION 12 tablet 0    ketoconazole 2 % External Shampoo Apply 1 Application topically as needed for Itching.      Simethicone (GAS-X OR) daily as needed.      atorvastatin 80 MG Oral Tab Take 1 tablet (80 mg total) by mouth daily. 90 tablet 3    Losartan Potassium 25 MG Oral Tab Take 1 tablet (25 mg total) by mouth nightly. 90 tablet 1    ezetimibe 10 MG Oral Tab Take 1 tablet (10 mg total) by mouth daily. 90 tablet 1    POTASSIUM OR Take by mouth as needed. Potassium Over The Counter daily      aspirin 81 MG Oral Tab Take 1 tablet (81 mg total) by mouth nightly.      Multiple Vitamins-Minerals (CENTRUM CARDIO OR) Take by mouth daily.         Assessment & Plan  Post-Influenza  Cough  Persistent cough following influenza infection, with clear to light tan sputum. No fever, chills, shortness of breath, or chest tightness. Chest X-ray previously normal. Likely residual bronchitis.  -Continue hydration and warm compresses.  -Use Flonase nasal spray 2 puffs in each nostril daily.  -Take Mucinex during the day.  -Use Tessalon Perles (benzonatate) as needed for cough.  -Use cough syrup with codeine at night to suppress cough and aid sleep.  -Observe for worsening symptoms including increased sputum production, change in sputum color to yellow/green, fever, chills, shortness of breath, or chest pain. If these occur, return for reassessment and possible antibiotic treatment.    Night Sweats  Chronic night sweats, ongoing for over a year. No associated fever.  -Continue to monitor. No specific intervention at this time.         Follow Up: As needed/if symptoms worsen       Objective/ Results:   Physical Exam  Vitals reviewed.   Constitutional:       General: He is not in acute distress.     Appearance: Normal appearance. He is not ill-appearing.   HENT:      Head: Normocephalic and atraumatic.   Eyes:      Extraocular Movements: Extraocular movements intact.      Conjunctiva/sclera: Conjunctivae normal.      Pupils: Pupils are equal, round, and reactive to light.   Cardiovascular:      Rate and Rhythm: Normal rate and regular rhythm.      Heart sounds: No murmur heard.     No gallop.   Pulmonary:      Effort: Pulmonary effort is normal. No respiratory distress.      Breath sounds: No stridor or decreased air movement. Examination of the left-lower field reveals rhonchi. Rhonchi present. No wheezing or rales.   Musculoskeletal:      Right lower leg: No edema.      Left lower leg: No edema.   Skin:     General: Skin is warm.      Capillary Refill: Capillary refill takes less than 2 seconds.   Neurological:      General: No focal deficit present.      Mental Status: He is alert and oriented to person,  place, and time.   Psychiatric:         Mood and Affect: Mood normal.         Behavior: Behavior normal.         Thought Content: Thought content normal.         Judgment: Judgment normal.        Physical Exam  VITALS: SaO2- 98%  CHEST: Crackles present, possibly on the left side.     Reviewed:    Patient Active Problem List    Diagnosis    Mild cognitive impairment with memory loss    Balance problem    Essential hypertension    Memory loss    Gastroesophageal reflux disease    Abnormal cardiovascular stress test    Night sweats    Facet arthritis of lumbosacral region     He has lumbar spondylosis managed by pain      Lower urinary tract symptoms (LUTS)     He had a TURP and since then had hematuria for years then 2017 went to ER and stopped plavix and no bleeding since. LUTS every 2 h nocturia, weak stream at night, no hesitancy  He saw  and is due to see again, on finasteride and flomax, little improvement in nocturia , stream not so weak  He has lumbar spondylosis managed by pain      Thrombocytopenia     Stable since , sees heme, mild ITP  He sees heme yearly for night sweats and thrombocytopenia plts 125k f/u 1 year      Hypothyroid    Coronary artery disease involving native coronary artery of native heart without angina pectoris     CABG times 3  And stents  abnromal stress testing since, per cardiology :\"In setting of normal EF, no sx's, good exercise tolerance will not pursue cardiac cath and continue with medical therapy\"      Pure hypercholesterolemia    Benign prostatic hyperplasia with urinary frequency      Allergies[1]     Social History     Socioeconomic History    Marital status:    Tobacco Use    Smoking status: Former     Current packs/day: 0.00     Average packs/day: 0.5 packs/day for 20.0 years (10.0 ttl pk-yrs)     Types: Cigarettes     Start date: 3/18/1985     Quit date: 3/18/2005     Years since quittin.9     Passive exposure: Past    Smokeless tobacco: Never   Vaping Use     Vaping status: Never Used   Substance and Sexual Activity    Alcohol use: Yes     Alcohol/week: 1.0 standard drink of alcohol     Types: 1 Glasses of wine per week     Comment: Wine daily 1oz    Drug use: No   Other Topics Concern    Caffeine Concern Yes     Comment: 3 cups a day    Exercise Yes     Comment: 3 days a week- cardio and weights    Seat Belt Yes    Special Diet No    Stress Concern Yes     Comment: personal    Weight Concern No     Social Drivers of Health      [facility]    Housing Stability      Review of Systems   Constitutional:  Negative for appetite change, chills and fever.   HENT:  Negative for congestion, sore throat, trouble swallowing and voice change.    Eyes:  Negative for pain, redness, itching and visual disturbance.   Respiratory:  Positive for cough. Negative for chest tightness, shortness of breath and wheezing.    Gastrointestinal:  Negative for abdominal pain, constipation, diarrhea, nausea and vomiting.   Musculoskeletal: Negative.    Skin: Negative.    Neurological:  Negative for dizziness and light-headedness.       All other systems negative unless otherwise stated in ROS or HPI above.       Lia Garcia MD  Internal Medicine       Call office with any questions or seek emergency care if necessary.   Patient understands and agrees to follow directions and advice.      ----------------------------------------- PATIENT INSTRUCTIONS-----------------------------------------     There are no Patient Instructions on file for this visit.        The 21st Century Cures Act makes medical notes available to patients in the interest of transparency.  However, please be advised that this is a medical document.  It is intended as a peer to peer communication.  It is written in medical language and may contain abbreviations or verbiage that are technical and unfamiliar.  It may appear blunt or direct.  Medical documents are  intended to carry relevant information, facts as evident, and the clinical opinion of the practitioner.          [1]   Allergies  Allergen Reactions    Penicillins RASH

## 2025-03-10 ENCOUNTER — TELEPHONE (OUTPATIENT)
Dept: NEUROLOGY | Facility: CLINIC | Age: 80
End: 2025-03-10

## 2025-03-10 NOTE — TELEPHONE ENCOUNTER
Patient is called and informed that per Dr. Messer, his EEG is normal and the Vitamin B12 level is normal.  He will follow up in 6 months.  He expressed understanding.

## 2025-03-28 DIAGNOSIS — E03.9 ACQUIRED HYPOTHYROIDISM: ICD-10-CM

## 2025-03-28 RX ORDER — LEVOTHYROXINE SODIUM 112 UG/1
112 TABLET ORAL
Qty: 90 TABLET | Refills: 0 | Status: SHIPPED | OUTPATIENT
Start: 2025-03-28 | End: 2025-06-02

## 2025-03-28 RX ORDER — OMEPRAZOLE 40 MG/1
40 CAPSULE, DELAYED RELEASE ORAL
Qty: 90 CAPSULE | Refills: 3 | OUTPATIENT
Start: 2025-03-28

## 2025-05-30 DIAGNOSIS — E03.9 ACQUIRED HYPOTHYROIDISM: ICD-10-CM

## 2025-06-02 RX ORDER — LEVOTHYROXINE SODIUM 112 UG/1
112 TABLET ORAL
Qty: 90 TABLET | Refills: 0 | Status: SHIPPED | OUTPATIENT
Start: 2025-06-02

## 2025-06-02 NOTE — TELEPHONE ENCOUNTER
Thyroid Medication Protocol [id number]/30/2025 03:36 AM   Protocol Details TSH in past 12 months    Last TSH value is normal    In person appointment or virtual visit in the past 12 mos or appointment in next 3 mos    Medication is active on med list      8. Acquired hypothyroidism  Continue levothyroxine.   Future Appointments   Date Time Provider Department Center   11/21/2025  9:40 AM Thee Hill DO [id number]

## 2025-06-06 DIAGNOSIS — N40.0 BENIGN PROSTATIC HYPERPLASIA, UNSPECIFIED WHETHER LOWER URINARY TRACT SYMPTOMS PRESENT: ICD-10-CM

## 2025-06-09 RX ORDER — FINASTERIDE 5 MG/1
5 TABLET, FILM COATED ORAL DAILY
Qty: 100 TABLET | Refills: 2 | OUTPATIENT
Start: 2025-06-09

## 2025-07-30 DIAGNOSIS — N40.0 BENIGN PROSTATIC HYPERPLASIA, UNSPECIFIED WHETHER LOWER URINARY TRACT SYMPTOMS PRESENT: ICD-10-CM

## 2025-07-31 RX ORDER — FINASTERIDE 5 MG/1
5 TABLET, FILM COATED ORAL DAILY
Qty: 90 TABLET | Refills: 0 | Status: SHIPPED | OUTPATIENT
Start: 2025-07-31

## 2025-08-12 DIAGNOSIS — I10 ESSENTIAL HYPERTENSION: ICD-10-CM

## 2025-08-12 DIAGNOSIS — Z00.00 ENCOUNTER FOR ANNUAL HEALTH EXAMINATION: ICD-10-CM

## 2025-08-12 DIAGNOSIS — E03.9 ACQUIRED HYPOTHYROIDISM: ICD-10-CM

## 2025-08-12 DIAGNOSIS — I25.10 CORONARY ARTERY DISEASE INVOLVING NATIVE CORONARY ARTERY OF NATIVE HEART WITHOUT ANGINA PECTORIS: Primary | ICD-10-CM

## 2025-08-12 DIAGNOSIS — E78.00 PURE HYPERCHOLESTEROLEMIA: ICD-10-CM

## 2025-08-14 RX ORDER — LEVOTHYROXINE SODIUM 112 UG/1
112 TABLET ORAL
Qty: 90 TABLET | Refills: 0 | Status: SHIPPED | OUTPATIENT
Start: 2025-08-14

## 2025-08-19 RX ORDER — TAMSULOSIN HYDROCHLORIDE 0.4 MG/1
0.4 CAPSULE ORAL 2 TIMES DAILY
Qty: 200 CAPSULE | Refills: 2 | OUTPATIENT
Start: 2025-08-19

## (undated) NOTE — ED AVS SNAPSHOT
Svetlana Major   MRN: [id number]    Department:  [facility] Emergency Department   Date of Visit:  10/23/2019           Disclosure     Insurance plans vary and the physician(s) referred by the ER may not be covered by your plan.  Please contact y tell this physician (or your personal doctor if your instructions are to return to your personal doctor) about any new or lasting problems. The primary care or specialist physician will see patients referred from the [facility] Emergency Department.  Val Espana

## (undated) NOTE — Clinical Note
I had the pleasure of seeing Mr. Elda Bob in my office today. Please see my attached note.       Khushi Jones

## (undated) NOTE — LETTER
AUTHORIZATION FOR SURGICAL OPERATION OR OTHER PROCEDURE    1. I hereby authorize Dr. Sonal Pettit, and [facility] staff assigned to my case to perform the following operation and/or procedure at the [facility], [facility]:    _______________________________________________________________________________________________    Cortisone Injection Left Foot  _______________________________________________________________________________________________    2. My physician has explained the nature and purpose of the operation or other procedure, possible alternative methods of treatment, the risks involved, and the possibility of complication to me. I acknowledge that no guarantee has been made as to the result that may be obtained. 3.  I recognize that, during the course of this operation, or other procedure, unforseen conditions may necessitate additional or different procedure than those listed above. I, therefore, further authorize and request that the above named physician, his/her physician assistants or designees perform such procedures as are, in his/her professional opinion, necessary and desirable. 4.  Any tissue or organs removed in the operation or other procedure may be disposed of by and at the discretion of the [facility], [facility] and [facility]. 5.  I understand that in the event of a medical emergency, I will be transported by local paramedics to [facility] or other hospital emergency department. 6.  I certify that I have read and fully understand the above consent to operation and/or other procedure. 7.  I acknowledge that my physician has explained sedation/analgesia administration to me including the risks and benefits. I consent to the administration of sedation/analgesia as may be necessary or desirable in the judgement of my physician.     Witness signature: ___________________________________________________ Date:  ______/______/_____ Time:  ________ A. M.  P.M. Patient Name:  ______________________________________________________  (please print)      Patient signature:  ___________________________________________________             Relationship to Patient:           []  Parent    Responsible person                          []  Spouse  In case of minor or                    [] Other  _____________   Incompetent name:  __________________________________________________                               (please print)      _____________      Responsible person  In case of minor or  Incompetent signature:  _______________________________________________    Statement of Physician  My signature below affirms that prior to the time of the procedure, I have explained to the patient and/or his/her guardian, the risks and benefits involved in the proposed treatment and any reasonable alternative to the proposed treatment. I have also explained the risks and benefits involved in the refusal of the proposed treatment and have answered the patient's questions.                         Date:  ______/______/_______  Provider                      Signature:  __________________________________________________________       Time:  ___________ A.M    P.M.

## (undated) NOTE — LETTER
Patient Name: Roland Castillo  YOB: 1945          MRN :  [id number]  Date:  9/10/2020  Referring Physician:  Colt Yuen    Progress/Discharge Summary  Pt has attended 6 visits in Physical Therapy.      Dx: At risk for falling      - Feet together: EO  >30 sec; on airex EO >30 sec; EC >30 sec; EC on airex >30 sec mod sway               - Modified Tandem:  >30 sec JESSICA                - Tandem Stance: >30 sec JESSICA   Fall Risk: no               - SLS: R 18 sec, L 16 sec made in PT. - issued and reviewed    FOTO: Did not perform due to COVID-19 precautions. Plan: Patient to hold from PT x 4-6 weeks with continued independent HEP to ensure does well over the long term.  Should patient note regression or return of symptom

## (undated) NOTE — Clinical Note
I had the pleasure of seeing Mr. Yadira Rangel in my office today. Please see my attached note.       Rocio Guajardo